# Patient Record
Sex: FEMALE | Race: BLACK OR AFRICAN AMERICAN | NOT HISPANIC OR LATINO | Employment: OTHER | URBAN - METROPOLITAN AREA
[De-identification: names, ages, dates, MRNs, and addresses within clinical notes are randomized per-mention and may not be internally consistent; named-entity substitution may affect disease eponyms.]

---

## 2017-06-02 PROBLEM — R55 NEAR SYNCOPE: Status: ACTIVE | Noted: 2017-06-02

## 2017-09-08 PROBLEM — R55 VASOVAGAL SYNCOPE: Status: ACTIVE | Noted: 2017-09-08

## 2018-01-16 ENCOUNTER — OFFICE VISIT (OUTPATIENT)
Dept: CARDIOLOGY | Facility: CLINIC | Age: 78
End: 2018-01-16
Payer: MEDICARE

## 2018-01-16 VITALS
HEIGHT: 63 IN | DIASTOLIC BLOOD PRESSURE: 76 MMHG | SYSTOLIC BLOOD PRESSURE: 130 MMHG | HEART RATE: 77 BPM | WEIGHT: 180.69 LBS | BODY MASS INDEX: 32.02 KG/M2

## 2018-01-16 DIAGNOSIS — I34.0 MITRAL VALVE INSUFFICIENCY, UNSPECIFIED ETIOLOGY: ICD-10-CM

## 2018-01-16 DIAGNOSIS — E78.5 HYPERLIPIDEMIA, UNSPECIFIED HYPERLIPIDEMIA TYPE: ICD-10-CM

## 2018-01-16 DIAGNOSIS — I10 ESSENTIAL HYPERTENSION: Primary | ICD-10-CM

## 2018-01-16 PROCEDURE — 93000 ELECTROCARDIOGRAM COMPLETE: CPT | Mod: S$GLB,,, | Performed by: INTERNAL MEDICINE

## 2018-01-16 PROCEDURE — 99999 PR PBB SHADOW E&M-EST. PATIENT-LVL III: CPT | Mod: PBBFAC,,, | Performed by: INTERNAL MEDICINE

## 2018-01-16 PROCEDURE — 99213 OFFICE O/P EST LOW 20 MIN: CPT | Mod: S$GLB,,, | Performed by: INTERNAL MEDICINE

## 2018-01-16 RX ORDER — LANOLIN ALCOHOL/MO/W.PET/CERES
100 CREAM (GRAM) TOPICAL DAILY
COMMUNITY

## 2018-01-16 NOTE — PROGRESS NOTES
Subjective:      Patient ID: Lori Pulido is a 77 y.o. female.    Chief Complaint: Follow-up (HTN)    HPI:  Pt was evaluated by Dr Maria last year for woozziness and has a f/u appt in March.  MRI and EEG were reportedly OK.    Review of Systems   Cardiovascular: Positive for dyspnea on exertion (Walking far distance in the cold). Negative for chest pain, claudication, irregular heartbeat, leg swelling, near-syncope, orthopnea, palpitations and syncope.      Had trigeminal neuralgia  Pain left ear. Pt was given Tylenol 4 for the pain by the orthopedic MD--Dr Ortega at [facility].  Pt had this once before.     Pt has arthritis in the knee and sciatica.    Pt c/o constipation with Tylenol #4  Past Medical History:   Diagnosis Date    Allergy     DVT (deep venous thrombosis)     after TKA    Hyperlipidemia     Hypertension         Past Surgical History:   Procedure Laterality Date    APPENDECTOMY      BREAST SURGERY      lumpectomy    CARPAL TUNNEL RELEASE      CATARACT EXTRACTION BILATERAL W/ ANTERIOR VITRECTOMY       SECTION      HYSTERECTOMY      TVH    JOINT REPLACEMENT      L knee    TOTAL KNEE ARTHROPLASTY         Family History   Problem Relation Age of Onset    Heart disease Father     Cancer Sister 89     breast cancer    Dementia Sister        Social History     Social History    Marital status:      Spouse name: N/A    Number of children: N/A    Years of education: N/A     Social History Main Topics    Smoking status: Former Smoker    Smokeless tobacco: Never Used    Alcohol use No    Drug use: No    Sexual activity: Not Asked     Other Topics Concern    None     Social History Narrative    None       Current Outpatient Prescriptions on File Prior to Visit   Medication Sig Dispense Refill    acetaminophen (TYLENOL) 325 MG tablet Take 325 mg by mouth every 6 (six) hours as needed for Pain.      aspirin (ECOTRIN) 81 MG EC tablet Take 81 mg by mouth once daily.       "hydrochlorothiazide (MICROZIDE) 12.5 mg capsule Take 1 capsule (12.5 mg total) by mouth once daily. 90 capsule 3    losartan (COZAAR) 50 MG tablet Take 25 mg by mouth once daily.      pantoprazole (PROTONIX) 40 MG tablet TK 1 T PO QD  0    rosuvastatin (CRESTOR) 5 MG tablet       [DISCONTINUED] diclofenac (VOLTAREN) 50 MG EC tablet Take 50 mg by mouth 2 (two) times daily.       No current facility-administered medications on file prior to visit.        Review of patient's allergies indicates:   Allergen Reactions    Morphine     Penicillins      Objective:     Vitals:    01/16/18 1046   BP: 130/76   BP Location: Left arm   Patient Position: Sitting   BP Method: Large (Manual)   Pulse: 77   Weight: 82 kg (180 lb 11.2 oz)   Height: 5' 3" (1.6 m)        Physical Exam   Constitutional: She is oriented to person, place, and time. She appears well-developed and well-nourished.   Eyes: No scleral icterus.   Neck: No JVD present. Carotid bruit is not present.   Cardiovascular: Normal rate and regular rhythm.  Exam reveals no gallop.    Murmur (I/VI systolic) heard.  Pulmonary/Chest: Breath sounds normal.   Musculoskeletal: She exhibits no edema.   Neurological: She is alert and oriented to person, place, and time.   Skin: Skin is warm and dry.   Psychiatric: She has a normal mood and affect. Her behavior is normal. Judgment and thought content normal.   Vitals reviewed.     ECG: NSR, WNL  Assessment:     1. Essential hypertension    2. Hyperlipidemia, unspecified hyperlipidemia type    3. Mitral valve insufficiency, unspecified etiology      Plan:   Lori was seen today for follow-up.    Diagnoses and all orders for this visit:    Essential hypertension    Hyperlipidemia, unspecified hyperlipidemia type    Mitral valve insufficiency, unspecified etiology     Continue same medical regimen.  Return to clinic in 6 months with ECG.  F/u with Dr Mckeon who does labs    No Follow-up on file.  "

## 2018-06-11 RX ORDER — HYDROCHLOROTHIAZIDE 12.5 MG/1
12.5 CAPSULE ORAL DAILY
Qty: 90 CAPSULE | Refills: 3 | Status: SHIPPED | OUTPATIENT
Start: 2018-06-11 | End: 2020-05-28 | Stop reason: SDUPTHER

## 2018-07-31 ENCOUNTER — OFFICE VISIT (OUTPATIENT)
Dept: CARDIOLOGY | Facility: CLINIC | Age: 78
End: 2018-07-31
Payer: MEDICARE

## 2018-07-31 VITALS
DIASTOLIC BLOOD PRESSURE: 64 MMHG | SYSTOLIC BLOOD PRESSURE: 108 MMHG | HEART RATE: 79 BPM | WEIGHT: 174.5 LBS | BODY MASS INDEX: 30.92 KG/M2 | HEIGHT: 63 IN

## 2018-07-31 DIAGNOSIS — I10 ESSENTIAL HYPERTENSION: Primary | ICD-10-CM

## 2018-07-31 DIAGNOSIS — E78.5 HYPERLIPIDEMIA, UNSPECIFIED HYPERLIPIDEMIA TYPE: ICD-10-CM

## 2018-07-31 DIAGNOSIS — I82.502 CHRONIC DEEP VEIN THROMBOSIS (DVT) OF LEFT LOWER EXTREMITY, UNSPECIFIED VEIN: ICD-10-CM

## 2018-07-31 DIAGNOSIS — R10.13 EPIGASTRIC PAIN: ICD-10-CM

## 2018-07-31 PROCEDURE — 99999 PR PBB SHADOW E&M-EST. PATIENT-LVL III: CPT | Mod: PBBFAC,,, | Performed by: INTERNAL MEDICINE

## 2018-07-31 PROCEDURE — 3074F SYST BP LT 130 MM HG: CPT | Mod: CPTII,S$GLB,, | Performed by: INTERNAL MEDICINE

## 2018-07-31 PROCEDURE — 99213 OFFICE O/P EST LOW 20 MIN: CPT | Mod: S$GLB,,, | Performed by: INTERNAL MEDICINE

## 2018-07-31 PROCEDURE — 93000 ELECTROCARDIOGRAM COMPLETE: CPT | Mod: S$GLB,,, | Performed by: INTERNAL MEDICINE

## 2018-07-31 PROCEDURE — 3078F DIAST BP <80 MM HG: CPT | Mod: CPTII,S$GLB,, | Performed by: INTERNAL MEDICINE

## 2018-07-31 RX ORDER — TRIAMCINOLONE ACETONIDE 1 MG/G
OINTMENT TOPICAL
COMMUNITY
Start: 2018-06-29

## 2018-07-31 RX ORDER — FLUTICASONE PROPIONATE 50 MCG
SPRAY, SUSPENSION (ML) NASAL DAILY PRN
COMMUNITY
Start: 2018-07-27 | End: 2019-08-27

## 2018-07-31 NOTE — PROGRESS NOTES
Subjective:      Patient ID: Lori Pulido is a 78 y.o. female.    Chief Complaint: Follow-up (C/O being off balance at times)    HPI:  Rarely off balance.  Goes to PT for sciatica and right knee  May get a gel injection in knee    Review of Systems   Cardiovascular: Negative for chest pain, claudication, dyspnea on exertion, irregular heartbeat, leg swelling, near-syncope, orthopnea, palpitations and syncope.      One day in April pt had a headache and shaking and diarrhea and blood pressure went up to 170 mm Hg.     Pt rarely gets an epigastric pain which radiates to the back which abates after 15 minutes.  The pain is not related to activity.  The pain occurs very rarely.  Last episode was 4 months ago  Past Medical History:   Diagnosis Date    Allergy     DVT (deep venous thrombosis)     after TKA    Hyperlipidemia     Hypertension         Past Surgical History:   Procedure Laterality Date    APPENDECTOMY      BREAST SURGERY      lumpectomy    CARPAL TUNNEL RELEASE      CATARACT EXTRACTION BILATERAL W/ ANTERIOR VITRECTOMY       SECTION      HYSTERECTOMY      TVH    JOINT REPLACEMENT      L knee    TOTAL KNEE ARTHROPLASTY         Family History   Problem Relation Age of Onset    Heart disease Father     Cancer Sister 89        breast cancer    Dementia Sister        Social History     Social History    Marital status:      Spouse name: N/A    Number of children: N/A    Years of education: N/A     Social History Main Topics    Smoking status: Former Smoker    Smokeless tobacco: Never Used    Alcohol use No    Drug use: No    Sexual activity: Not Asked     Other Topics Concern    None     Social History Narrative    None       Current Outpatient Prescriptions on File Prior to Visit   Medication Sig Dispense Refill    acetaminophen (TYLENOL) 325 MG tablet Take 325 mg by mouth every 6 (six) hours as needed for Pain.      aspirin (ECOTRIN) 81 MG EC tablet Take 81 mg by  "mouth once daily.      cyanocobalamin (VITAMIN B-12) 1000 MCG tablet Take 100 mcg by mouth once daily.      hydroCHLOROthiazide (MICROZIDE) 12.5 mg capsule Take 1 capsule (12.5 mg total) by mouth once daily. 90 capsule 3    losartan (COZAAR) 50 MG tablet Take 25 mg by mouth once daily.      pantoprazole (PROTONIX) 40 MG tablet TK 1 T PO QD  0    rosuvastatin (CRESTOR) 5 MG tablet Take 5 mg by mouth once daily.        No current facility-administered medications on file prior to visit.        Review of patient's allergies indicates:   Allergen Reactions    Morphine     Penicillins      Objective:     Vitals:    07/31/18 0951   BP: 108/64   BP Location: Left arm   Patient Position: Sitting   BP Method: Large (Manual)   Pulse: 79   Weight: 79.2 kg (174 lb 8 oz)   Height: 5' 3" (1.6 m)        Physical Exam   Constitutional: She is oriented to person, place, and time. She appears well-developed and well-nourished.   Eyes: No scleral icterus.   Neck: No JVD present. Carotid bruit is not present.   Cardiovascular: Normal rate and regular rhythm.  Exam reveals no gallop.    Murmur (I/VI systolic) heard.  Pulmonary/Chest: Breath sounds normal.   Musculoskeletal: She exhibits no edema.   Neurological: She is alert and oriented to person, place, and time.   Skin: Skin is warm and dry.   Psychiatric: She has a normal mood and affect. Her behavior is normal. Judgment and thought content normal.   Vitals reviewed.     Wt down 7 lbs    ECG: NSR, WNL  Assessment:     1. Essential hypertension    2. Hyperlipidemia, unspecified hyperlipidemia type    3. Chronic deep vein thrombosis (DVT) of left lower extremity, unspecified vein    4. Epigastric pain      Plan:   Lori was seen today for follow-up.    Diagnoses and all orders for this visit:    Essential hypertension    Hyperlipidemia, unspecified hyperlipidemia type    Chronic deep vein thrombosis (DVT) of left lower extremity, unspecified vein    Epigastric pain  -     US " Abdomen Complete; Future     Same meds  RTC 6 months  F/u with Dr Mckeon who does labs  Ultrasound of gallbladder  Follow-up in about 6 months (around 1/31/2019).

## 2019-02-12 ENCOUNTER — OFFICE VISIT (OUTPATIENT)
Dept: CARDIOLOGY | Facility: CLINIC | Age: 79
End: 2019-02-12
Payer: MEDICARE

## 2019-02-12 ENCOUNTER — TELEPHONE (OUTPATIENT)
Dept: CARDIOLOGY | Facility: CLINIC | Age: 79
End: 2019-02-12

## 2019-02-12 ENCOUNTER — HOSPITAL ENCOUNTER (OUTPATIENT)
Dept: RADIOLOGY | Facility: HOSPITAL | Age: 79
Discharge: HOME | End: 2019-02-12
Attending: INTERNAL MEDICINE
Payer: MEDICARE

## 2019-02-12 VITALS
BODY MASS INDEX: 30.71 KG/M2 | DIASTOLIC BLOOD PRESSURE: 86 MMHG | HEIGHT: 63 IN | SYSTOLIC BLOOD PRESSURE: 131 MMHG | WEIGHT: 173.31 LBS | HEART RATE: 73 BPM

## 2019-02-12 DIAGNOSIS — Z03.89 UNDER OBSERVATION FOR SUSPECTED CORONARY ARTERY DISEASE: ICD-10-CM

## 2019-02-12 DIAGNOSIS — R06.02 SHORTNESS OF BREATH: ICD-10-CM

## 2019-02-12 DIAGNOSIS — I10 ESSENTIAL HYPERTENSION: Primary | ICD-10-CM

## 2019-02-12 DIAGNOSIS — E78.00 PURE HYPERCHOLESTEROLEMIA: ICD-10-CM

## 2019-02-12 DIAGNOSIS — G60.9 IDIOPATHIC PERIPHERAL NEUROPATHY: ICD-10-CM

## 2019-02-12 DIAGNOSIS — Z91.89 AT RISK FOR CORONARY ARTERY DISEASE: ICD-10-CM

## 2019-02-12 DIAGNOSIS — I27.20 PULMONARY HYPERTENSION: ICD-10-CM

## 2019-02-12 DIAGNOSIS — R07.9 CHEST PAIN, UNSPECIFIED TYPE: ICD-10-CM

## 2019-02-12 DIAGNOSIS — I20.89 ATYPICAL ANGINA: ICD-10-CM

## 2019-02-12 DIAGNOSIS — R06.09 DYSPNEA ON EXERTION: ICD-10-CM

## 2019-02-12 PROCEDURE — 1101F PT FALLS ASSESS-DOCD LE1/YR: CPT | Mod: CPTII,S$GLB,, | Performed by: INTERNAL MEDICINE

## 2019-02-12 PROCEDURE — 71046 X-RAY EXAM CHEST 2 VIEWS: CPT | Mod: TC,FY

## 2019-02-12 PROCEDURE — 99999 PR PBB SHADOW E&M-EST. PATIENT-LVL III: ICD-10-PCS | Mod: PBBFAC,,, | Performed by: INTERNAL MEDICINE

## 2019-02-12 PROCEDURE — 71046 XR CHEST PA AND LATERAL: ICD-10-PCS | Mod: 26,,, | Performed by: RADIOLOGY

## 2019-02-12 PROCEDURE — 3079F PR MOST RECENT DIASTOLIC BLOOD PRESSURE 80-89 MM HG: ICD-10-PCS | Mod: CPTII,S$GLB,, | Performed by: INTERNAL MEDICINE

## 2019-02-12 PROCEDURE — 71046 X-RAY EXAM CHEST 2 VIEWS: CPT | Mod: 26,,, | Performed by: RADIOLOGY

## 2019-02-12 PROCEDURE — 99214 PR OFFICE/OUTPT VISIT, EST, LEVL IV, 30-39 MIN: ICD-10-PCS | Mod: S$GLB,,, | Performed by: INTERNAL MEDICINE

## 2019-02-12 PROCEDURE — 3079F DIAST BP 80-89 MM HG: CPT | Mod: CPTII,S$GLB,, | Performed by: INTERNAL MEDICINE

## 2019-02-12 PROCEDURE — 99214 OFFICE O/P EST MOD 30 MIN: CPT | Mod: S$GLB,,, | Performed by: INTERNAL MEDICINE

## 2019-02-12 PROCEDURE — 93000 EKG 12-LEAD: ICD-10-PCS | Mod: S$GLB,,, | Performed by: INTERNAL MEDICINE

## 2019-02-12 PROCEDURE — 3075F SYST BP GE 130 - 139MM HG: CPT | Mod: CPTII,S$GLB,, | Performed by: INTERNAL MEDICINE

## 2019-02-12 PROCEDURE — 99999 PR PBB SHADOW E&M-EST. PATIENT-LVL III: CPT | Mod: PBBFAC,,, | Performed by: INTERNAL MEDICINE

## 2019-02-12 PROCEDURE — 99499 UNLISTED E&M SERVICE: CPT | Mod: S$GLB,,, | Performed by: INTERNAL MEDICINE

## 2019-02-12 PROCEDURE — 1101F PR PT FALLS ASSESS DOC 0-1 FALLS W/OUT INJ PAST YR: ICD-10-PCS | Mod: CPTII,S$GLB,, | Performed by: INTERNAL MEDICINE

## 2019-02-12 PROCEDURE — 93000 ELECTROCARDIOGRAM COMPLETE: CPT | Mod: S$GLB,,, | Performed by: INTERNAL MEDICINE

## 2019-02-12 PROCEDURE — 3075F PR MOST RECENT SYSTOLIC BLOOD PRESS GE 130-139MM HG: ICD-10-PCS | Mod: CPTII,S$GLB,, | Performed by: INTERNAL MEDICINE

## 2019-02-12 PROCEDURE — 99499 RISK ADDL DX/OHS AUDIT: ICD-10-PCS | Mod: S$GLB,,, | Performed by: INTERNAL MEDICINE

## 2019-02-12 NOTE — PROGRESS NOTES
"  Subjective:      Patient ID: Lori Pulido is a 79 y.o. female.    Chief Complaint: Follow-up (Hypertension)    HPI:  Right knee gets stiff.  Had gel injected into right knee  Pt is S/P left TKA  Pt had only two episodes of epigastric pain radiating to back last year associated with vomiting and eased with vomiting.  The epigastric/retrosternal pains are severe and radiate to the back.  Pt saw Dr Howe--pt has stones in duct.  Pt is contemplating ?ERCP and has an appt to see a general surgeon for possible cholecystectomy--Dr Orin Lara.    Pt walks and had gone to therapy and went to [facility] program.  Pt still has sciatica bilaterally.    Review of Systems   Cardiovascular: Positive for dyspnea on exertion (mild,chronic). Negative for chest pain, claudication, irregular heartbeat, leg swelling, near-syncope, orthopnea, palpitations and syncope.      No food intolerance.    Pt states that at times her feet feel tingly and cold even when they are not cool to the touch.  "Sometimes it feels like I am walking barefoot in water."    Pt c/o chronic insomnia    Pt has LLQ pain eased with BM  Past Medical History:   Diagnosis Date    Allergy     DVT (deep venous thrombosis)     after TKA    Hyperlipidemia     Hypertension         Past Surgical History:   Procedure Laterality Date    APPENDECTOMY      BREAST SURGERY      lumpectomy    CARPAL TUNNEL RELEASE      CATARACT EXTRACTION BILATERAL W/ ANTERIOR VITRECTOMY       SECTION      HYSTERECTOMY      TVH    JOINT REPLACEMENT      L knee    TOTAL KNEE ARTHROPLASTY         Family History   Problem Relation Age of Onset    Heart disease Father     Cancer Sister 89        breast cancer    Dementia Sister        Social History     Socioeconomic History    Marital status:      Spouse name: None    Number of children: None    Years of education: None    Highest education level: None   Social Needs    Financial resource strain: None    " "Food insecurity - worry: None    Food insecurity - inability: None    Transportation needs - medical: None    Transportation needs - non-medical: None   Occupational History    None   Tobacco Use    Smoking status: Former Smoker    Smokeless tobacco: Never Used   Substance and Sexual Activity    Alcohol use: No    Drug use: No    Sexual activity: None   Other Topics Concern    None   Social History Narrative    None       Current Outpatient Medications on File Prior to Visit   Medication Sig Dispense Refill    acetaminophen (TYLENOL) 325 MG tablet Take 325 mg by mouth every 6 (six) hours as needed for Pain.      aspirin (ECOTRIN) 81 MG EC tablet Take 81 mg by mouth once daily.      cyanocobalamin (VITAMIN B-12) 1000 MCG tablet Take 100 mcg by mouth once daily.      fluticasone (FLONASE) 50 mcg/actuation nasal spray daily as needed.       hydroCHLOROthiazide (MICROZIDE) 12.5 mg capsule Take 1 capsule (12.5 mg total) by mouth once daily. 90 capsule 3    losartan (COZAAR) 50 MG tablet Take 25 mg by mouth once daily.      pantoprazole (PROTONIX) 40 MG tablet TK 1 T PO QD  0    rosuvastatin (CRESTOR) 5 MG tablet Take 5 mg by mouth once daily.       triamcinolone acetonide 0.1% (KENALOG) 0.1 % ointment        No current facility-administered medications on file prior to visit.        Review of patient's allergies indicates:   Allergen Reactions    Morphine     Penicillins      Objective:     Vitals:    02/12/19 0958   BP: 131/86   BP Location: Left arm   Patient Position: Sitting   BP Method: Large (Automatic)   Pulse: 73   Weight: 78.6 kg (173 lb 4.8 oz)   Height: 5' 3" (1.6 m)        Physical Exam   Constitutional: She is oriented to person, place, and time. She appears well-developed and well-nourished.   Eyes: No scleral icterus.   Neck: No JVD present. Carotid bruit is not present.   Cardiovascular: Normal rate and regular rhythm. Exam reveals no gallop.   No murmur heard.  Pulmonary/Chest: " Breath sounds normal.   Musculoskeletal: She exhibits no edema.   Neurological: She is alert and oriented to person, place, and time.   Skin: Skin is warm and dry.   Psychiatric: She has a normal mood and affect. Her behavior is normal. Judgment and thought content normal.   Vitals reviewed.     Wt stable  ECG: NSR, WNL    Note FBS last year was 104    Wt down 7 lbs over past year    Note last echo 2015 showed mild pulmonary hypertension with PAP45 mm Hg.  LVEF was normal.  Assessment:     1. Essential hypertension    2. Pure hypercholesterolemia    3. Shortness of breath    4. Idiopathic peripheral neuropathy    5. Dyspnea on exertion    6. Pulmonary hypertension    7. Under observation for suspected coronary artery disease    8. At risk for coronary artery disease    9. Atypical angina    10. Chest pain, unspecified type      Plan:   Lori was seen today for follow-up.    Diagnoses and all orders for this visit:    Essential hypertension  -     IN OFFICE EKG 12-LEAD (to Muse)    Pure hypercholesterolemia    Shortness of breath  -     Transthoracic echo (TTE) complete (Cupid Only); Future  -     X-Ray Chest PA And Lateral; Future    Idiopathic peripheral neuropathy    Dyspnea on exertion    Pulmonary hypertension  -     NM Myocardial Perfusion Spect Multi Pharmacologic; Future    Under observation for suspected coronary artery disease  -     NM Myocardial Perfusion Spect Multi Pharmacologic; Future    At risk for coronary artery disease    Atypical angina  -     Treadmill Stress Test (CUPID ONLY); Future    Chest pain, unspecified type  -     NM Myocardial Perfusion Spect Multi Pharmacologic; Future     Pt has symptoms of a peripheral neuropathy which may be due to borderline diabetes.  Pt also has a hx of B12 deficiency and has bilateral sciatica as potential causes of dysesthesias of feet.    Consider gabapentin.    Pt is medically stable for cholecystectomy if necessary.   F/u with Mendoza Howe and  Marco.    Will review labs from Dr Mckeon.    Will repeat echocardiogram with doppler to f/u mild pulmonary hypertension    Will repeat Lexiscan Cardiolite stress test due to shortness of breath with exertion and the epigastric/retrosternal pains (which are suspected to be due to gallstones)    Will also get CXR    Follow-up in about 6 months (around 8/12/2019).

## 2019-02-13 ENCOUNTER — TELEPHONE (OUTPATIENT)
Dept: CARDIOLOGY | Facility: CLINIC | Age: 79
End: 2019-02-13

## 2019-02-13 NOTE — TELEPHONE ENCOUNTER
Scheduled patient's Lexiscan stress and Echo at [facility] per patients request.  Tests scheduled for February 15, 2019 @ 7am with Echo to follow.  Patient notified and verbalized understanding.

## 2019-02-15 ENCOUNTER — TELEPHONE (OUTPATIENT)
Dept: CARDIOLOGY | Facility: CLINIC | Age: 79
End: 2019-02-15

## 2019-02-21 RX ORDER — LOSARTAN POTASSIUM 50 MG/1
25 TABLET ORAL DAILY
Qty: 90 TABLET | Refills: 3 | Status: SHIPPED | OUTPATIENT
Start: 2019-02-21 | End: 2020-05-28 | Stop reason: SDUPTHER

## 2019-08-27 ENCOUNTER — OFFICE VISIT (OUTPATIENT)
Dept: CARDIOLOGY | Facility: CLINIC | Age: 79
End: 2019-08-27
Payer: MEDICARE

## 2019-08-27 VITALS
DIASTOLIC BLOOD PRESSURE: 77 MMHG | BODY MASS INDEX: 30.54 KG/M2 | HEART RATE: 85 BPM | SYSTOLIC BLOOD PRESSURE: 112 MMHG | WEIGHT: 172.38 LBS | HEIGHT: 63 IN

## 2019-08-27 DIAGNOSIS — I10 ESSENTIAL HYPERTENSION: Primary | ICD-10-CM

## 2019-08-27 DIAGNOSIS — E78.00 PURE HYPERCHOLESTEROLEMIA: ICD-10-CM

## 2019-08-27 PROCEDURE — 1101F PR PT FALLS ASSESS DOC 0-1 FALLS W/OUT INJ PAST YR: ICD-10-PCS | Mod: CPTII,S$GLB,, | Performed by: INTERNAL MEDICINE

## 2019-08-27 PROCEDURE — 99213 PR OFFICE/OUTPT VISIT, EST, LEVL III, 20-29 MIN: ICD-10-PCS | Mod: S$GLB,,, | Performed by: INTERNAL MEDICINE

## 2019-08-27 PROCEDURE — 1101F PT FALLS ASSESS-DOCD LE1/YR: CPT | Mod: CPTII,S$GLB,, | Performed by: INTERNAL MEDICINE

## 2019-08-27 PROCEDURE — 93000 ELECTROCARDIOGRAM COMPLETE: CPT | Mod: S$GLB,,, | Performed by: INTERNAL MEDICINE

## 2019-08-27 PROCEDURE — 3074F SYST BP LT 130 MM HG: CPT | Mod: CPTII,S$GLB,, | Performed by: INTERNAL MEDICINE

## 2019-08-27 PROCEDURE — 93000 EKG 12-LEAD: ICD-10-PCS | Mod: S$GLB,,, | Performed by: INTERNAL MEDICINE

## 2019-08-27 PROCEDURE — 99213 OFFICE O/P EST LOW 20 MIN: CPT | Mod: S$GLB,,, | Performed by: INTERNAL MEDICINE

## 2019-08-27 PROCEDURE — 99999 PR PBB SHADOW E&M-EST. PATIENT-LVL II: ICD-10-PCS | Mod: PBBFAC,,, | Performed by: INTERNAL MEDICINE

## 2019-08-27 PROCEDURE — 99999 PR PBB SHADOW E&M-EST. PATIENT-LVL II: CPT | Mod: PBBFAC,,, | Performed by: INTERNAL MEDICINE

## 2019-08-27 PROCEDURE — 99499 RISK ADDL DX/OHS AUDIT: ICD-10-PCS | Mod: S$GLB,,, | Performed by: INTERNAL MEDICINE

## 2019-08-27 PROCEDURE — 3074F PR MOST RECENT SYSTOLIC BLOOD PRESSURE < 130 MM HG: ICD-10-PCS | Mod: CPTII,S$GLB,, | Performed by: INTERNAL MEDICINE

## 2019-08-27 PROCEDURE — 3078F DIAST BP <80 MM HG: CPT | Mod: CPTII,S$GLB,, | Performed by: INTERNAL MEDICINE

## 2019-08-27 PROCEDURE — 99499 UNLISTED E&M SERVICE: CPT | Mod: S$GLB,,, | Performed by: INTERNAL MEDICINE

## 2019-08-27 PROCEDURE — 3078F PR MOST RECENT DIASTOLIC BLOOD PRESSURE < 80 MM HG: ICD-10-PCS | Mod: CPTII,S$GLB,, | Performed by: INTERNAL MEDICINE

## 2019-08-27 RX ORDER — GABAPENTIN 100 MG/1
CAPSULE ORAL
Refills: 1 | COMMUNITY
Start: 2019-06-05 | End: 2020-08-13 | Stop reason: SDUPTHER

## 2019-08-27 RX ORDER — LORATADINE 10 MG/1
TABLET ORAL
COMMUNITY
Start: 2019-02-15

## 2019-08-27 NOTE — PROGRESS NOTES
"  Subjective:      Patient ID: Lori Pulido is a 79 y.o. female.    Chief Complaint: Follow-up (hypertension)    HPI:  Pt had ERCP followed by cholecystectomy followed by recurrent ERCP for retained stones and required PICC line for sepsis afterwards.    "I have had a couple of little episodes which remind me of the symptoms of epigastric pain which radiates around the RUQ similar to before the ERCP but no longer associated with vomiting.    Review of Systems   Cardiovascular: Positive for dyspnea on exertion (mild, chronic). Negative for chest pain, claudication, irregular heartbeat, leg swelling, near-syncope, orthopnea, palpitations and syncope.      Right knee hurts a lot.    Past Medical History:   Diagnosis Date    Allergy     DVT (deep venous thrombosis)     after TKA    Hyperlipidemia     Hypertension         Past Surgical History:   Procedure Laterality Date    APPENDECTOMY      BREAST SURGERY      lumpectomy    CARPAL TUNNEL RELEASE      CATARACT EXTRACTION BILATERAL W/ ANTERIOR VITRECTOMY       SECTION      HYSTERECTOMY      TVH    JOINT REPLACEMENT      L knee    LAPAROSCOPIC CHOLECYSTECTOMY WITH CHOLANGIOGRAPHY  2019    TOTAL KNEE ARTHROPLASTY         Family History   Problem Relation Age of Onset    Heart disease Father     Cancer Sister 89        breast cancer    Dementia Sister        Social History     Socioeconomic History    Marital status:      Spouse name: Not on file    Number of children: Not on file    Years of education: Not on file    Highest education level: Not on file   Occupational History    Not on file   Social Needs    Financial resource strain: Not on file    Food insecurity:     Worry: Not on file     Inability: Not on file    Transportation needs:     Medical: Not on file     Non-medical: Not on file   Tobacco Use    Smoking status: Former Smoker    Smokeless tobacco: Never Used   Substance and Sexual Activity    Alcohol use: " "No    Drug use: No    Sexual activity: Not on file   Lifestyle    Physical activity:     Days per week: Not on file     Minutes per session: Not on file    Stress: Not on file   Relationships    Social connections:     Talks on phone: Not on file     Gets together: Not on file     Attends [employer] service: Not on file     Active member of club or organization: Not on file     Attends meetings of clubs or organizations: Not on file     Relationship status: Not on file   Other Topics Concern    Not on file   Social History Narrative    Not on file       Current Outpatient Medications on File Prior to Visit   Medication Sig Dispense Refill    acetaminophen (TYLENOL) 325 MG tablet Take 325 mg by mouth every 6 (six) hours as needed for Pain.      aspirin (ECOTRIN) 81 MG EC tablet Take 81 mg by mouth once daily.      cyanocobalamin (VITAMIN B-12) 1000 MCG tablet Take 100 mcg by mouth once daily.      gabapentin (NEURONTIN) 100 MG capsule TAKE 1 TABLET BY MOUTH AT BEDTIME AS TOLERATED  1    hydroCHLOROthiazide (MICROZIDE) 12.5 mg capsule Take 1 capsule (12.5 mg total) by mouth once daily. 90 capsule 3    loratadine (CLARITIN) 10 mg tablet Take by mouth.      losartan (COZAAR) 50 MG tablet Take 0.5 tablets (25 mg total) by mouth once daily. 90 tablet 3    pantoprazole (PROTONIX) 40 MG tablet TK 1 T PO QD  0    rosuvastatin (CRESTOR) 5 MG tablet Take 5 mg by mouth once daily.       triamcinolone acetonide 0.1% (KENALOG) 0.1 % ointment       [DISCONTINUED] fluticasone (FLONASE) 50 mcg/actuation nasal spray daily as needed.        No current facility-administered medications on file prior to visit.        Review of patient's allergies indicates:   Allergen Reactions    Morphine     Penicillins      Objective:     Vitals:    08/27/19 1047   BP: 112/77   BP Location: Left arm   Patient Position: Sitting   BP Method: Large (Automatic)   Pulse: 85   Weight: 78.2 kg (172 lb 6.4 oz)   Height: 5' 3" (1.6 m)    "     Physical Exam   Constitutional: She is oriented to person, place, and time. She appears well-developed and well-nourished.   Eyes: No scleral icterus.   Neck: No JVD present. Carotid bruit is not present.   Cardiovascular: Normal rate and regular rhythm. Exam reveals no gallop.   No murmur heard.  Pulmonary/Chest: Breath sounds normal.   Musculoskeletal: She exhibits no edema.   Neurological: She is alert and oriented to person, place, and time.   Skin: Skin is warm and dry.   Psychiatric: She has a normal mood and affect. Her behavior is normal. Judgment and thought content normal.   Vitals reviewed.     Wt down 8 lbs    ECG: NSR with PAC's, WNL    Note-- Cardiolite stress test earlier this year was normal with normal LVEF      Assessment:     1. Essential hypertension    2. Pure hypercholesterolemia      Plan:   Lori was seen today for follow-up.    Diagnoses and all orders for this visit:    Essential hypertension  -     IN OFFICE EKG 12-LEAD (to Muse)    Pure hypercholesterolemia     f/u with Dr Howe    F/u with Dr Du who is now with [facility]    Will try to obtain labs done recently    Follow up in about 6 months (around 2/27/2020).

## 2019-09-30 ENCOUNTER — TELEPHONE (OUTPATIENT)
Dept: PRIMARY CARE CLINIC | Facility: CLINIC | Age: 79
End: 2019-09-30

## 2019-09-30 NOTE — TELEPHONE ENCOUNTER
----- Message from Bertha Ceballos sent at 9/30/2019  2:31 PM CDT -----  Contact: patient [phone]  Patient called and said she is returning a call from this morning.The message said that you needed to change the time of her appointment but she needs to know before the end of today. Patient is scheduled for 8:40 and doesn't want to come if you are changing her time. Please call patient asap .

## 2019-09-30 NOTE — PROGRESS NOTES
[facility] Note    Chief Complaint      Chief Complaint   Patient presents with    Medication Management       History of Present Illness      Lori Pulido is a 79 y.o. AAF with HTN, HLD, GERD, Choledocholithiasis, OA presents to re-est care and to fu chronic issues and for pre-op clearance for RT TKA sched for 10/28/19 with Dr. Cuellar. She was last seen by me at [facility].  Await records for review.    HTN - Pt controlled on Losartan 25 mg/d, and HCTZ12.5 mg/d. Fu by [facility], Dr. Stevens whom she saw 4 wks ago. Cardiolite stress test earlier this year was normal with normal LVEF    HLD - Pt on Crestor 5 mg QHS.      ? Vit B12 def- Pt on Vit B12 1000 mcg/d.    Neuropathy - Pt is on gabapentin 100 mg QHS.     Nicotine dependence - Pt is a current some day smoker. 1 pack every 2-3 wks. Rec cessation.     GERD - Pt on Protonix 40 mg po Prn. Stable.     Choledocholithiasis - Pt had repeat ERCP yest with Dr. Pappas. Sched for repeat LFT's in 4 wks.  Note pt has had ERCP followed by cholecystectomy followed by recurrent ERCP for retained stones and required PICC line for sepsis afterwards    Oa - Fu by Dr. Cuellar.  Planning for Rt TKA on 10/28/19.     HCM - Flu - 10/1/19;  Td - ? ;  PCV 13 - ?;  PVX 23 - ?;  Shingrix - none;  PAP - no longer gets;  MGM - ;  DEXA - ?16;  C-scope - ?'16 -  Had a polyp;  CRS - Dr. Morales;  GI - Dr. Pappas; [facility] - Dr. Stevens; Ortho - Dr. Cuellar    Past Medical History:  Past Medical History:   Diagnosis Date    Allergic rhinitis     Choledocholithiasis     DVT (deep venous thrombosis)     after TKA    GERD (gastroesophageal reflux disease)     Hemorrhoids     Hyperlipidemia     Hypertension     Neuropathy     Nicotine dependence        Past Surgical History:   has a past surgical history that includes  section; Appendectomy; Cataract extraction bilateral w/ anterior vitrectomy; Carpal tunnel release; Breast surgery; Hysterectomy; Laparoscopic  cholecystectomy with cholangiography (02/28/2019); and Total knee arthroplasty.    Family History:  family history includes Aortic dissection in her sister; Cancer (age of onset: 89) in her sister; Dementia in her sister; Heart disease in her father.     Social History:  Social History     Tobacco Use    Smoking status: Current Some Day Smoker     Years: 20.00     Types: Cigarettes    Smokeless tobacco: Never Used    Tobacco comment: 1 pack every 2-3 wks   Substance Use Topics    Alcohol use: No    Drug use: Never       Review of Systems   Constitutional: Negative for chills, diaphoresis and fever.   HENT: Positive for hearing loss. Negative for tinnitus.    Eyes: Negative for blurred vision and double vision.        Wears glasses   Respiratory: Positive for shortness of breath and wheezing. Negative for cough.         Sometimes gets NAIR - chronic   Cardiovascular: Positive for palpitations. Negative for chest pain and leg swelling.        No SOB, No CP, no Dizziness. Last ~ a few minutes and self resolves. If she sleeps on her left side she can feel it. Fu by Cards.    Gastrointestinal: Positive for constipation and heartburn. Negative for abdominal pain, blood in stool, diarrhea, melena, nausea and vomiting.        Sometimes gets constipated.  + Int Hemorrhoid and rarely gets blood on outside of stool after a hard stool. Never fills the toilet.    Genitourinary: Negative for dysuria and hematuria.   Musculoskeletal: Positive for joint pain. Negative for myalgias.        Left knee and low back   Skin: Positive for itching. Negative for rash.   Neurological: Negative for dizziness and headaches.   Endo/Heme/Allergies: Negative for polydipsia. Does not bruise/bleed easily.        Dry mouth at night   Psychiatric/Behavioral: Negative for depression. The patient is nervous/anxious.         Medications:  Outpatient Encounter Medications as of 10/1/2019   Medication Sig Note Dispense Refill    acetaminophen  (TYLENOL) 325 MG tablet Take 325 mg by mouth every 6 (six) hours as needed for Pain.       aspirin (ECOTRIN) 81 MG EC tablet Take 81 mg by mouth once daily.       cyanocobalamin (VITAMIN B-12) 1000 MCG tablet Take 100 mcg by mouth once daily.       gabapentin (NEURONTIN) 100 MG capsule TAKE 1 TABLET BY MOUTH AT BEDTIME AS TOLERATED   1    hydroCHLOROthiazide (MICROZIDE) 12.5 mg capsule Take 1 capsule (12.5 mg total) by mouth once daily.  90 capsule 3    loratadine (CLARITIN) 10 mg tablet Take by mouth.       losartan (COZAAR) 50 MG tablet Take 0.5 tablets (25 mg total) by mouth once daily.  90 tablet 3    pantoprazole (PROTONIX) 40 MG tablet TK 1 T PO QD 4/29/2016: Received from: External Pharmacy  0    rosuvastatin (CRESTOR) 5 MG tablet Take 5 mg by mouth once daily.  6/2/2017: Received from: External Pharmacy      triamcinolone acetonide 0.1% (KENALOG) 0.1 % ointment         No facility-administered encounter medications on file as of 10/1/2019.        Current Outpatient Medications:     acetaminophen (TYLENOL) 325 MG tablet, Take 325 mg by mouth every 6 (six) hours as needed for Pain., Disp: , Rfl:     aspirin (ECOTRIN) 81 MG EC tablet, Take 81 mg by mouth once daily., Disp: , Rfl:     cyanocobalamin (VITAMIN B-12) 1000 MCG tablet, Take 100 mcg by mouth once daily., Disp: , Rfl:     gabapentin (NEURONTIN) 100 MG capsule, TAKE 1 TABLET BY MOUTH AT BEDTIME AS TOLERATED, Disp: , Rfl: 1    hydroCHLOROthiazide (MICROZIDE) 12.5 mg capsule, Take 1 capsule (12.5 mg total) by mouth once daily., Disp: 90 capsule, Rfl: 3    loratadine (CLARITIN) 10 mg tablet, Take by mouth., Disp: , Rfl:     losartan (COZAAR) 50 MG tablet, Take 0.5 tablets (25 mg total) by mouth once daily., Disp: 90 tablet, Rfl: 3    pantoprazole (PROTONIX) 40 MG tablet, TK 1 T PO QD, Disp: , Rfl: 0    rosuvastatin (CRESTOR) 5 MG tablet, Take 5 mg by mouth once daily. , Disp: , Rfl:     triamcinolone acetonide 0.1% (KENALOG) 0.1 %  "ointment, , Disp: , Rfl:     flu vacc [id number],65yr up,PF (FLUZONE HIGH-DOSE 2019-20, PF,) 180 mcg/0.5 mL Syrg, inject 0.5 ml into the muscle for 1 dose, Disp: 0.5 mL, Rfl: 0    varicella-zoster [id number], PF, (SHINGRIX, PF,) 50 mcg/0.5 mL injection, Inject into the muscle., Disp: 0.5 mL, Rfl: 1    Allergies:  Review of patient's allergies indicates:   Allergen Reactions    Morphine      Itching/Hives    Penicillins        Health Maintenance:  Immunization History   Administered Date(s) Administered    Influenza - High Dose - PF (65 years and older) 10/01/2019    Zoster Recombinant 10/01/2019      Health Maintenance   Topic Date Due    TETANUS VACCINE  01/23/1958    DEXA SCAN  01/23/1979    Pneumococcal Vaccine (65+ Low/Medium Risk) (1 of 2 - PCV13) 01/23/2004    Aspirin/Antiplatelet Therapy  10/01/2020    Lipid Panel  06/10/2021      Objective:      Vital Signs  Temp: 98.1 °F (36.7 °C)  Pulse: 70  Resp: 20  SpO2: 97 %  BP: 114/72  BP Location: Right arm  Pain Score: 0-No pain  Height and Weight  Height: 5' 3" (160 cm)  Weight: 77.8 kg (171 lb 8.3 oz)  BSA (Calculated - sq m): 1.86 sq meters  BMI (Calculated): 30.4  Weight in (lb) to have BMI = 25: 140.8]    Laboratory:    Urine Microalbumin/Cr:  Lab Results   Component Value Date    MICALBCRE 9.6 07/25/2014     Physical Exam   Constitutional: She is oriented to person, place, and time. She appears well-developed and well-nourished. No distress.   HENT:   Head: Normocephalic and atraumatic.   Right Ear: External ear normal.   Left Ear: External ear normal.   Mouth/Throat: Oropharynx is clear and moist.   Eyes: Pupils are equal, round, and reactive to light. EOM are normal.   Neck: Normal range of motion. Neck supple. No thyromegaly present.   Cardiovascular: Normal rate, regular rhythm, normal heart sounds and intact distal pulses.   No murmur heard.  Pulmonary/Chest: Effort normal and breath sounds normal. No respiratory distress.   Abdominal: Soft. " Bowel sounds are normal. She exhibits no distension. There is no tenderness.   Musculoskeletal:   RT knee crepitus   Lymphadenopathy:     She has no cervical adenopathy.   Neurological: She is alert and oriented to person, place, and time.   Skin: Skin is warm and dry. She is not diaphoretic.   Psychiatric: She has a normal mood and affect.   Nursing note and vitals reviewed.          Assessment:       1. Preop exam for internal medicine    2. Hypertension, unspecified type    3. Choledocholithiasis    4. Cigarette nicotine dependence without complication    5. Neuropathy    6. Osteoarthritis of right knee, unspecified osteoarthritis type    7. Flu vaccine need        Lori Pulido is a 79 y.o.female with:    1. Preop exam for internal medicine  -Performed today.  Will get recent labs to review to clear pt for surgery.    2. Hypertension, unspecified type  -Stable.  Cont current regimen.    3. Choledocholithiasis  -Fu by Dr. Pappas.  Pt is s/p ERCP yest and is doing well today.  She is sched for repeat LFT's in 4 wks.     4. Cigarette nicotine dependence without complication  -Rec cessation.     5. Neuropathy  -Cont Gabapentin.  Will get old records to review.    6. Osteoarthritis of right knee, unspecified osteoarthritis type  -Planning for upcoming Rt TKA.  Will review labs to clear her. She is also going to get clearance form her Cards whom she saw 4 wks ago.    7. Flu vaccine need  -Admin today.       Chronic conditions status updated as per HPI.  Other than changes above, cont current medications and maintain follow up with specialists.  Return to clinic in 6 mos or sooner if needed    Fannie Du MD  [facility]

## 2019-09-30 NOTE — TELEPHONE ENCOUNTER
----- Message from Bertha Ceballos sent at 9/30/2019 12:33 PM CDT -----  Contact: patient [phone]  Patient is returning a phone call.  Who left a message for the patient: nurse  Does patient know what this is regarding:  Changing her appt for tomorrow  Comments:

## 2019-10-01 ENCOUNTER — IMMUNIZATION (OUTPATIENT)
Dept: PHARMACY | Facility: CLINIC | Age: 79
End: 2019-10-01
Payer: MEDICARE

## 2019-10-01 ENCOUNTER — OFFICE VISIT (OUTPATIENT)
Dept: PRIMARY CARE CLINIC | Facility: CLINIC | Age: 79
End: 2019-10-01
Payer: MEDICARE

## 2019-10-01 ENCOUNTER — IMMUNIZATION (OUTPATIENT)
Dept: PHARMACY | Facility: CLINIC | Age: 79
End: 2019-10-01

## 2019-10-01 VITALS
RESPIRATION RATE: 20 BRPM | DIASTOLIC BLOOD PRESSURE: 72 MMHG | HEIGHT: 63 IN | OXYGEN SATURATION: 97 % | HEART RATE: 70 BPM | WEIGHT: 171.5 LBS | TEMPERATURE: 98 F | BODY MASS INDEX: 30.39 KG/M2 | SYSTOLIC BLOOD PRESSURE: 114 MMHG

## 2019-10-01 DIAGNOSIS — F17.210 CIGARETTE NICOTINE DEPENDENCE WITHOUT COMPLICATION: ICD-10-CM

## 2019-10-01 DIAGNOSIS — Z23 FLU VACCINE NEED: ICD-10-CM

## 2019-10-01 DIAGNOSIS — K80.50 CHOLEDOCHOLITHIASIS: ICD-10-CM

## 2019-10-01 DIAGNOSIS — G62.9 NEUROPATHY: ICD-10-CM

## 2019-10-01 DIAGNOSIS — Z01.818 PREOP EXAM FOR INTERNAL MEDICINE: Primary | ICD-10-CM

## 2019-10-01 DIAGNOSIS — I10 HYPERTENSION, UNSPECIFIED TYPE: ICD-10-CM

## 2019-10-01 DIAGNOSIS — M17.11 OSTEOARTHRITIS OF RIGHT KNEE, UNSPECIFIED OSTEOARTHRITIS TYPE: ICD-10-CM

## 2019-10-01 PROCEDURE — 3074F PR MOST RECENT SYSTOLIC BLOOD PRESSURE < 130 MM HG: ICD-10-PCS | Mod: CPTII,S$GLB,, | Performed by: INTERNAL MEDICINE

## 2019-10-01 PROCEDURE — 99214 PR OFFICE/OUTPT VISIT, EST, LEVL IV, 30-39 MIN: ICD-10-PCS | Mod: S$GLB,,, | Performed by: INTERNAL MEDICINE

## 2019-10-01 PROCEDURE — 1101F PT FALLS ASSESS-DOCD LE1/YR: CPT | Mod: CPTII,S$GLB,, | Performed by: INTERNAL MEDICINE

## 2019-10-01 PROCEDURE — 99214 OFFICE O/P EST MOD 30 MIN: CPT | Mod: S$GLB,,, | Performed by: INTERNAL MEDICINE

## 2019-10-01 PROCEDURE — 99999 PR PBB SHADOW E&M-EST. PATIENT-LVL IV: ICD-10-PCS | Mod: PBBFAC,,, | Performed by: INTERNAL MEDICINE

## 2019-10-01 PROCEDURE — 1101F PR PT FALLS ASSESS DOC 0-1 FALLS W/OUT INJ PAST YR: ICD-10-PCS | Mod: CPTII,S$GLB,, | Performed by: INTERNAL MEDICINE

## 2019-10-01 PROCEDURE — 99999 PR PBB SHADOW E&M-EST. PATIENT-LVL IV: CPT | Mod: PBBFAC,,, | Performed by: INTERNAL MEDICINE

## 2019-10-01 PROCEDURE — 99499 UNLISTED E&M SERVICE: CPT | Mod: S$GLB,,, | Performed by: INTERNAL MEDICINE

## 2019-10-01 PROCEDURE — 3078F DIAST BP <80 MM HG: CPT | Mod: CPTII,S$GLB,, | Performed by: INTERNAL MEDICINE

## 2019-10-01 PROCEDURE — 99499 RISK ADDL DX/OHS AUDIT: ICD-10-PCS | Mod: S$GLB,,, | Performed by: INTERNAL MEDICINE

## 2019-10-01 PROCEDURE — 3074F SYST BP LT 130 MM HG: CPT | Mod: CPTII,S$GLB,, | Performed by: INTERNAL MEDICINE

## 2019-10-01 PROCEDURE — 3078F PR MOST RECENT DIASTOLIC BLOOD PRESSURE < 80 MM HG: ICD-10-PCS | Mod: CPTII,S$GLB,, | Performed by: INTERNAL MEDICINE

## 2019-12-20 ENCOUNTER — TELEPHONE (OUTPATIENT)
Dept: PRIMARY CARE CLINIC | Facility: CLINIC | Age: 79
End: 2019-12-20

## 2019-12-20 DIAGNOSIS — Z00.00 ROUTINE ADULT HEALTH MAINTENANCE: ICD-10-CM

## 2019-12-20 DIAGNOSIS — Z12.39 BREAST CANCER SCREENING: Primary | ICD-10-CM

## 2019-12-20 NOTE — TELEPHONE ENCOUNTER
Pt requesting external mammogram order. Pt would like to have mammogram done at . Please authorize. Order pended.  Calin Campbell LPN

## 2019-12-20 NOTE — TELEPHONE ENCOUNTER
----- Message from Gene Jha sent at 12/20/2019 11:45 AM CST -----  Contact: Patient [phone]  Type: Referral to a Specialist    Where would you like a referral to? mammo gram     Have you previously requested? No     Is the physician within the [facility]? No     Name and phone number of specialist: Diagnostic imaging     Reason for appointment: yearly     Is an appointment scheduled with specialist? When? Yes     12/24/19 8:45 am    Comments: call to inform has been faxed, requesting to be sent prior to Tuesday has a 8:45 am appt.    Please call an advise  Thank you

## 2019-12-20 NOTE — TELEPHONE ENCOUNTER
Did u mean to put in a diagnostic or screening MG? Her old MGM under media was wnl in Dec so unless there is another reason I am unaware of it appears it should be screening. If you meant for screening plese cancel this order and I will sign the correct one.    Dr. NUNEZ

## 2020-01-02 ENCOUNTER — TELEPHONE (OUTPATIENT)
Dept: PRIMARY CARE CLINIC | Facility: CLINIC | Age: 80
End: 2020-01-02

## 2020-01-02 NOTE — TELEPHONE ENCOUNTER
I sw patient and she expressed understanding. She does not need a new rx right now. She will double up on her current supply

## 2020-01-02 NOTE — TELEPHONE ENCOUNTER
Pt would like to know if you could increase her Gabapentin. She is currently on 100mg QHS. She reports it is working but not to full satisfaction.

## 2020-01-02 NOTE — TELEPHONE ENCOUNTER
She may inc to 2 capsules at night and if this is tolerated but not helping enough she can inc to 3 capsules at night in 1 wk.  See if she needs a new refill    Dr. NUNEZ

## 2020-01-15 ENCOUNTER — IMMUNIZATION (OUTPATIENT)
Dept: PHARMACY | Facility: CLINIC | Age: 80
End: 2020-01-15
Payer: MEDICARE

## 2020-03-05 ENCOUNTER — OFFICE VISIT (OUTPATIENT)
Dept: CARDIOLOGY | Facility: CLINIC | Age: 80
End: 2020-03-05
Payer: MEDICARE

## 2020-03-05 VITALS
BODY MASS INDEX: 29.95 KG/M2 | OXYGEN SATURATION: 98 % | SYSTOLIC BLOOD PRESSURE: 126 MMHG | DIASTOLIC BLOOD PRESSURE: 74 MMHG | HEART RATE: 74 BPM | HEIGHT: 63 IN | WEIGHT: 169 LBS

## 2020-03-05 DIAGNOSIS — E78.00 PURE HYPERCHOLESTEROLEMIA: ICD-10-CM

## 2020-03-05 DIAGNOSIS — I10 ESSENTIAL HYPERTENSION: Primary | ICD-10-CM

## 2020-03-05 DIAGNOSIS — I49.1 PREMATURE ATRIAL CONTRACTIONS: ICD-10-CM

## 2020-03-05 DIAGNOSIS — R00.2 PALPITATIONS: ICD-10-CM

## 2020-03-05 DIAGNOSIS — Z86.718 H/O DEEP VENOUS THROMBOSIS: ICD-10-CM

## 2020-03-05 PROCEDURE — 1101F PT FALLS ASSESS-DOCD LE1/YR: CPT | Mod: CPTII,S$GLB,, | Performed by: INTERNAL MEDICINE

## 2020-03-05 PROCEDURE — 1101F PR PT FALLS ASSESS DOC 0-1 FALLS W/OUT INJ PAST YR: ICD-10-PCS | Mod: CPTII,S$GLB,, | Performed by: INTERNAL MEDICINE

## 2020-03-05 PROCEDURE — 1159F MED LIST DOCD IN RCRD: CPT | Mod: S$GLB,,, | Performed by: INTERNAL MEDICINE

## 2020-03-05 PROCEDURE — 3074F SYST BP LT 130 MM HG: CPT | Mod: CPTII,S$GLB,, | Performed by: INTERNAL MEDICINE

## 2020-03-05 PROCEDURE — 93000 EKG 12-LEAD: ICD-10-PCS | Mod: S$GLB,,, | Performed by: INTERNAL MEDICINE

## 2020-03-05 PROCEDURE — 99214 OFFICE O/P EST MOD 30 MIN: CPT | Mod: 25,S$GLB,, | Performed by: INTERNAL MEDICINE

## 2020-03-05 PROCEDURE — 3078F DIAST BP <80 MM HG: CPT | Mod: CPTII,S$GLB,, | Performed by: INTERNAL MEDICINE

## 2020-03-05 PROCEDURE — 99214 PR OFFICE/OUTPT VISIT, EST, LEVL IV, 30-39 MIN: ICD-10-PCS | Mod: 25,S$GLB,, | Performed by: INTERNAL MEDICINE

## 2020-03-05 PROCEDURE — 93000 ELECTROCARDIOGRAM COMPLETE: CPT | Mod: S$GLB,,, | Performed by: INTERNAL MEDICINE

## 2020-03-05 PROCEDURE — 3074F PR MOST RECENT SYSTOLIC BLOOD PRESSURE < 130 MM HG: ICD-10-PCS | Mod: CPTII,S$GLB,, | Performed by: INTERNAL MEDICINE

## 2020-03-05 PROCEDURE — 99999 PR PBB SHADOW E&M-EST. PATIENT-LVL III: CPT | Mod: PBBFAC,,, | Performed by: INTERNAL MEDICINE

## 2020-03-05 PROCEDURE — 99999 PR PBB SHADOW E&M-EST. PATIENT-LVL III: ICD-10-PCS | Mod: PBBFAC,,, | Performed by: INTERNAL MEDICINE

## 2020-03-05 PROCEDURE — 1159F PR MEDICATION LIST DOCUMENTED IN MEDICAL RECORD: ICD-10-PCS | Mod: S$GLB,,, | Performed by: INTERNAL MEDICINE

## 2020-03-05 PROCEDURE — 3078F PR MOST RECENT DIASTOLIC BLOOD PRESSURE < 80 MM HG: ICD-10-PCS | Mod: CPTII,S$GLB,, | Performed by: INTERNAL MEDICINE

## 2020-03-05 RX ORDER — CELECOXIB 200 MG/1
CAPSULE ORAL
COMMUNITY
End: 2020-09-17

## 2020-03-05 NOTE — PROGRESS NOTES
Subjective:      Patient ID: Lori Pulido is a 80 y.o. female.    Chief Complaint: Follow-up (Hypertension)    HPI:  Had right TKA 10/28/19 by Dr Cuellar.  Finished PT.  Knee is still a little swollen.  Knee still itches.      Pt takes Celebrex and Tylenol prn.    Review of Systems   Cardiovascular: Positive for leg swelling (right knee since TKA). Negative for chest pain, claudication, dyspnea on exertion, irregular heartbeat, near-syncope, orthopnea, palpitations and syncope.        Past Medical History:   Diagnosis Date    Allergic rhinitis     Choledocholithiasis     DVT (deep venous thrombosis)     after TKA    GERD (gastroesophageal reflux disease)     Hemorrhoids     Hyperlipidemia     Hypertension     Neuropathy     Nicotine dependence         Past Surgical History:   Procedure Laterality Date    APPENDECTOMY      BREAST SURGERY      lumpectomy    CARPAL TUNNEL RELEASE      CATARACT EXTRACTION BILATERAL W/ ANTERIOR VITRECTOMY       SECTION      HYSTERECTOMY      partial    LAPAROSCOPIC CHOLECYSTECTOMY WITH CHOLANGIOGRAPHY  2019    TOTAL KNEE ARTHROPLASTY      L knee       Family History   Problem Relation Age of Onset    Heart disease Father     Cancer Sister 89        breast cancer    Dementia Sister     Aortic dissection Sister        Social History     Socioeconomic History    Marital status:      Spouse name: Not on file    Number of children: Not on file    Years of education: Not on file    Highest education level: Not on file   Occupational History    Not on file   Social Needs    Financial resource strain: Not on file    Food insecurity:     Worry: Not on file     Inability: Not on file    Transportation needs:     Medical: Not on file     Non-medical: Not on file   Tobacco Use    Smoking status: Former Smoker     Years: 20.00     Types: Cigarettes    Smokeless tobacco: Never Used    Tobacco comment: 1 pack every 2-3 wks   Substance and  Sexual Activity    Alcohol use: No    Drug use: Never    Sexual activity: Not Currently   Lifestyle    Physical activity:     Days per week: Not on file     Minutes per session: Not on file    Stress: Not on file   Relationships    Social connections:     Talks on phone: Not on file     Gets together: Not on file     Attends Latter day service: Not on file     Active member of club or organization: Not on file     Attends meetings of clubs or organizations: Not on file     Relationship status: Not on file   Other Topics Concern    Not on file   Social History Narrative    Not on file       Current Outpatient Medications on File Prior to Visit   Medication Sig Dispense Refill    acetaminophen (TYLENOL) 325 MG tablet Take 325 mg by mouth every 6 (six) hours as needed for Pain.      aspirin (ECOTRIN) 81 MG EC tablet Take 81 mg by mouth once daily.      celecoxib (CELEBREX) 200 MG capsule celecoxib 200 mg capsule   TAKE 1 CAPSULE BY MOUTH TWICE A DAY      cyanocobalamin (VITAMIN B-12) 1000 MCG tablet Take 100 mcg by mouth once daily.      gabapentin (NEURONTIN) 100 MG capsule TAKE 1 TABLET BY MOUTH AT BEDTIME AS TOLERATED  1    hydroCHLOROthiazide (MICROZIDE) 12.5 mg capsule Take 1 capsule (12.5 mg total) by mouth once daily. 90 capsule 3    loratadine (CLARITIN) 10 mg tablet Take by mouth.      losartan (COZAAR) 50 MG tablet Take 0.5 tablets (25 mg total) by mouth once daily. 90 tablet 3    pantoprazole (PROTONIX) 40 MG tablet TK 1 T PO QD  0    rosuvastatin (CRESTOR) 5 MG tablet Take 5 mg by mouth once daily.       triamcinolone acetonide 0.1% (KENALOG) 0.1 % ointment       [DISCONTINUED] flu vacc [id number],65yr up,PF (FLUZONE HIGH-DOSE 2019-20, PF,) 180 mcg/0.5 mL Syrg inject 0.5 ml into the muscle for 1 dose 0.5 mL 0    [DISCONTINUED] varicella-zoster [id number], PF, (SHINGRIX, PF,) 50 mcg/0.5 mL injection Inject into the muscle. 0.5 mL 1     No current facility-administered medications on file  "prior to visit.        Review of patient's allergies indicates:   Allergen Reactions    Morphine      Itching/Hives    Penicillins      Objective:     Vitals:    03/05/20 1004   BP: 126/74   BP Location: Left arm   Patient Position: Sitting   BP Method: Large (Automatic)   Pulse: 74   SpO2: 98%   Weight: 76.6 kg (168 lb 15.7 oz)   Height: 5' 3" (1.6 m)        Physical Exam   Constitutional: She is oriented to person, place, and time. She appears well-developed and well-nourished.   Eyes: No scleral icterus.   Neck: No JVD present. Carotid bruit is not present.   Cardiovascular: Normal rate and regular rhythm. Exam reveals no gallop.   No murmur heard.  Pulmonary/Chest: Breath sounds normal.   Musculoskeletal: She exhibits no edema.   Right knee incision is well healed. Mild right knee edema     Neurological: She is alert and oriented to person, place, and time.   Skin: Skin is warm and dry.   Psychiatric: She has a normal mood and affect. Her behavior is normal. Judgment and thought content normal.   Vitals reviewed.     Legs and calves are not edematous and not tender    ECG: NSR with PAC's, otherwise normal ECG    Note echocardiogram 2/19 showed normal LVEF and normal PA pressures.    Note Cardiolite stress test last year was normal    Assessment:     1. Essential hypertension    2. Palpitations    3. Premature atrial contractions    4. Pure hypercholesterolemia    5. H/O deep venous thrombosis      Plan:   Lori was seen today for follow-up.    Diagnoses and all orders for this visit:    Essential hypertension  -     IN OFFICE EKG 12-LEAD (to Muse)    Palpitations    Premature atrial contractions  -     IN OFFICE EKG 12-LEAD (to Muse)    Pure hypercholesterolemia    H/O deep venous thrombosis     Same meds    RTC 6 months    F/u with Dr Du next month who does labs    No follow-ups on file.  "

## 2020-03-06 ENCOUNTER — PATIENT OUTREACH (OUTPATIENT)
Dept: ADMINISTRATIVE | Facility: HOSPITAL | Age: 80
End: 2020-03-06

## 2020-03-11 RX ORDER — ROSUVASTATIN CALCIUM 5 MG/1
TABLET, COATED ORAL
Qty: 90 TABLET | Refills: 0 | Status: SHIPPED | OUTPATIENT
Start: 2020-03-11 | End: 2020-05-22

## 2020-03-13 ENCOUNTER — PATIENT OUTREACH (OUTPATIENT)
Dept: ADMINISTRATIVE | Facility: HOSPITAL | Age: 80
End: 2020-03-13

## 2020-03-13 NOTE — PROGRESS NOTES
Pre-visit chart review completed.  Immunizations reviewed and updated.    Patient due for the following    TETANUS VACCINE     Pneumococcal Vaccine (65+ Low/Medium Risk) (1 of 2 - PCV13)

## 2020-05-22 DIAGNOSIS — E78.00 PURE HYPERCHOLESTEROLEMIA: Primary | ICD-10-CM

## 2020-05-22 RX ORDER — ROSUVASTATIN CALCIUM 5 MG/1
TABLET, COATED ORAL
Qty: 90 TABLET | Refills: 0 | Status: SHIPPED | OUTPATIENT
Start: 2020-05-22 | End: 2020-08-20

## 2020-05-28 RX ORDER — HYDROCHLOROTHIAZIDE 12.5 MG/1
12.5 CAPSULE ORAL DAILY
Qty: 90 CAPSULE | Refills: 3 | Status: SHIPPED | OUTPATIENT
Start: 2020-05-28 | End: 2020-07-08 | Stop reason: SDUPTHER

## 2020-05-28 RX ORDER — LOSARTAN POTASSIUM 50 MG/1
25 TABLET ORAL DAILY
Qty: 90 TABLET | Refills: 3 | Status: SHIPPED | OUTPATIENT
Start: 2020-05-28 | End: 2020-07-08 | Stop reason: SDUPTHER

## 2020-06-17 ENCOUNTER — TELEPHONE (OUTPATIENT)
Dept: PRIMARY CARE CLINIC | Facility: CLINIC | Age: 80
End: 2020-06-17

## 2020-06-17 DIAGNOSIS — E78.00 PURE HYPERCHOLESTEROLEMIA: Primary | ICD-10-CM

## 2020-06-17 DIAGNOSIS — I10 ESSENTIAL HYPERTENSION: ICD-10-CM

## 2020-06-17 NOTE — TELEPHONE ENCOUNTER
----- Message from Alda Gonzalez sent at 6/17/2020  3:20 PM CDT -----  Contact: Migue [phone]  Patient Requesting Order    Order Needed:--Annual labs--    Communication Preference--Lori--[phone]    Additional Information:Pt calling to see if her labs orders can be put in the system for annual visit now so she can get them done when she comes in for her other labs. Please call to advise when put in the system.

## 2020-06-17 NOTE — TELEPHONE ENCOUNTER
What other labs is she referring to? All she needs is a CMP and FLP.  If this was ordered by another phsycian then that is ok and if not we can put in order.    Dr. NUNEZ

## 2020-06-19 ENCOUNTER — LAB VISIT (OUTPATIENT)
Dept: LAB | Facility: HOSPITAL | Age: 80
End: 2020-06-19
Attending: INTERNAL MEDICINE
Payer: MEDICARE

## 2020-06-19 ENCOUNTER — TELEPHONE (OUTPATIENT)
Dept: PRIMARY CARE CLINIC | Facility: CLINIC | Age: 80
End: 2020-06-19

## 2020-06-19 DIAGNOSIS — K22.70 BARRETT'S ESOPHAGUS: Primary | ICD-10-CM

## 2020-06-19 DIAGNOSIS — I10 ESSENTIAL HYPERTENSION: ICD-10-CM

## 2020-06-19 DIAGNOSIS — E78.00 PURE HYPERCHOLESTEROLEMIA: ICD-10-CM

## 2020-06-19 LAB
ALBUMIN SERPL BCP-MCNC: 4 G/DL (ref 3.5–5.2)
ALBUMIN SERPL BCP-MCNC: 4 G/DL (ref 3.5–5.2)
ALP SERPL-CCNC: 107 U/L (ref 55–135)
ALP SERPL-CCNC: 107 U/L (ref 55–135)
ALT SERPL W/O P-5'-P-CCNC: 10 U/L (ref 10–44)
ALT SERPL W/O P-5'-P-CCNC: 9 U/L (ref 10–44)
ANION GAP SERPL CALC-SCNC: 8 MMOL/L (ref 8–16)
AST SERPL-CCNC: 15 U/L (ref 10–40)
AST SERPL-CCNC: 16 U/L (ref 10–40)
BILIRUB DIRECT SERPL-MCNC: 0.3 MG/DL (ref 0.1–0.3)
BILIRUB SERPL-MCNC: 0.6 MG/DL (ref 0.1–1)
BILIRUB SERPL-MCNC: 0.6 MG/DL (ref 0.1–1)
BUN SERPL-MCNC: 18 MG/DL (ref 8–23)
CALCIUM SERPL-MCNC: 10.8 MG/DL (ref 8.7–10.5)
CHLORIDE SERPL-SCNC: 105 MMOL/L (ref 95–110)
CHOLEST SERPL-MCNC: 166 MG/DL (ref 120–199)
CHOLEST/HDLC SERPL: 3.5 {RATIO} (ref 2–5)
CO2 SERPL-SCNC: 27 MMOL/L (ref 23–29)
CREAT SERPL-MCNC: 1 MG/DL (ref 0.5–1.4)
EST. GFR  (AFRICAN AMERICAN): >60 ML/MIN/1.73 M^2
EST. GFR  (NON AFRICAN AMERICAN): 53 ML/MIN/1.73 M^2
GLUCOSE SERPL-MCNC: 94 MG/DL (ref 70–110)
HDLC SERPL-MCNC: 48 MG/DL (ref 40–75)
HDLC SERPL: 28.9 % (ref 20–50)
LDLC SERPL CALC-MCNC: 81 MG/DL (ref 63–159)
NONHDLC SERPL-MCNC: 118 MG/DL
POTASSIUM SERPL-SCNC: 4.2 MMOL/L (ref 3.5–5.1)
PROT SERPL-MCNC: 7.7 G/DL (ref 6–8.4)
PROT SERPL-MCNC: 7.7 G/DL (ref 6–8.4)
SODIUM SERPL-SCNC: 140 MMOL/L (ref 136–145)
TRIGL SERPL-MCNC: 185 MG/DL (ref 30–150)

## 2020-06-19 PROCEDURE — 80076 HEPATIC FUNCTION PANEL: CPT

## 2020-06-19 PROCEDURE — 80061 LIPID PANEL: CPT

## 2020-06-19 PROCEDURE — 80053 COMPREHEN METABOLIC PANEL: CPT

## 2020-06-19 NOTE — TELEPHONE ENCOUNTER
----- Message from Wen Desai sent at 6/19/2020  8:07 AM CDT -----  Regarding: Pt self Mobile/Home [phone]  Patient would like a call back in regards to her saying that you told her to call you for instructions before she have a blood test done.

## 2020-07-07 NOTE — PROGRESS NOTES
"[facility] Note    Chief Complaint      Chief Complaint   Patient presents with    Annual Exam    Medication Refill       History of Present Illness      Lori Pulido is a 80 y.o. AAF with HTN, HLD, GERD, Choledocholithiasis, OA presents to re-est care and to fu chronic issues and for pre-op clearance for RT TKA sched for 10/28/19 with Dr. Cuellar.  Last visit - 10/1/19.      HTN - Pt controlled on Losartan 25 mg/d, and HCTZ12.5 mg/d. Fu by [place], Dr. Stevens whom she saw in Mar. Cardiolite stress test earlier this year was normal with normal LVEF.     HLD - Pt on Crestor 5 mg QHS. Controlled.  Repeat in June.      Vit B12 def- Pt on Vit B12 1000 mcg/d.     Neuropathy - Pt is on Gabapentin 100-200 mg QHS.      Nicotine dependence - Pt is a current some day smoker. 5-6 cig/d but stopped 2 wks ago. Rec cessation.      GERD - Pt on Protonix 40 mg po Prn. Stable. Prilosec no help.      Choledocholithiasis - Pt had repeat ERCP yest with Dr. Pappas. Sched for repeat LFT's in 4 wks.  Note pt has had ERCP followed by cholecystectomy followed by recurrent ERCP for retained stones and required PICC line for sepsis afterwards     Oa - Fu by Dr. Cuellar.  Pt s/p RT TKA on 10/28/19.     CKD II - BUN/CR - 18/1.0 GFR - 53. Prev GFR - 55-70    Sciatica- Fu by Dr. Sin. Pt tired Diclofenac, Lyrica, Gabapentin, HYrocodone, and Tyelnol #3 in ast - no help.  She takes Tylenol #3/4 sparingly.     MNG - Largest nodule - 1.6 cm - reassured by Dr. Kearney. Will get records to review    Urinary incontinence - Wears pads sometimes but hasn't needed lately.  Kegals help. "Pretty good".     Insomnia - She feels gabapentin helps and sometimes takes a 2nd dose.  She drinks sleepy time tea.  She sometimes takes benadryl.  I cautioned her about this.     Memory Issues - RPR - NR.  TSH - wnl. B12 - 268 so pt on vit B12 suppl. She fu with Neuro and was reassured. She feels it is worsening and plans to fu with Dr. Maria " again.      HCM - Flu - 10/1/19;  Td - 8/31/15;  PCV 13 - 3/28/17;  PVX 23 - ?;  Shingrix - none;  PAP - no longer gets;  MGM - 19;  DEXA - 17;  C-scope - 20-  polyps - repeat 5 yrs;  CRS - Dr. Morales;  GI - Dr. Pappas; Cards - Dr. Stevens; Ortho - Dr. Cuellar; Derm - Dr. Rausch; Neuro - Dr. Maria     Past Medical History:  Past Medical History:   Diagnosis Date    Allergic rhinitis     Choledocholithiasis     DVT (deep venous thrombosis)     after TKA    GERD (gastroesophageal reflux disease)     Hemorrhoids     Hyperlipidemia     Hypertension     Neuropathy     Nicotine dependence        Past Surgical History:   has a past surgical history that includes  section; Appendectomy; Cataract extraction bilateral w/ anterior vitrectomy; Carpal tunnel release; Breast surgery; Hysterectomy; Laparoscopic cholecystectomy with cholangiography (2019); and Total knee arthroplasty.    Family History:  family history includes Aortic dissection in her sister; Cancer (age of onset: 89) in her sister; Dementia in her sister; Heart disease in her father.     Social History:  Social History     Tobacco Use    Smoking status: Former Smoker     Years: 20.00     Types: Cigarettes    Smokeless tobacco: Never Used    Tobacco comment: 1 pack every 2-3 wks   Substance Use Topics    Alcohol use: No    Drug use: Never       Review of Systems   Constitutional: Negative for chills and fever.   HENT: Negative for hearing loss and tinnitus.         Pt reports she has inc salivation.    Eyes: Positive for blurred vision. Negative for double vision.        Wears glasses.  Has fu with Ophtho in Nov.    Respiratory: Negative for cough and shortness of breath.    Cardiovascular: Negative for chest pain and palpitations.   Gastrointestinal: Positive for blood in stool and constipation. Negative for abdominal pain, diarrhea, heartburn, melena, nausea and vomiting.        Sometimes sees some slight blood  on outside of stool.  Does not fill toilet.  She thinks it is due to her hemorrhoid.  No pain or itching.    Genitourinary: Negative for dysuria, frequency and hematuria.   Musculoskeletal: Positive for joint pain. Negative for myalgias.        Sandeep knee off and on   Skin: Positive for itching. Negative for rash.        Itching to RT forearm if it rubs against something or scratches it.  No rash.  Rec loratadine 10 mg po daily prn.   Neurological: Negative for dizziness and headaches.   Endo/Heme/Allergies: Does not bruise/bleed easily.   Psychiatric/Behavioral: Positive for depression. The patient has insomnia. The patient is not nervous/anxious.         She lives with daughter and sometimes they have some difficulties She gets 5 hrs of sleep/night. She falls asleep ok but wakes up and can't get back to sleep. Gabapentin helps        Medications:  Outpatient Encounter Medications as of 7/8/2020   Medication Sig Note Dispense Refill    acetaminophen (TYLENOL) 325 MG tablet Take 325 mg by mouth every 6 (six) hours as needed for Pain.       aspirin (ECOTRIN) 81 MG EC tablet Take 81 mg by mouth once daily.       celecoxib (CELEBREX) 200 MG capsule celecoxib 200 mg capsule   TAKE 1 CAPSULE BY MOUTH TWICE A DAY       cyanocobalamin (VITAMIN B-12) 1000 MCG tablet Take 100 mcg by mouth once daily.       gabapentin (NEURONTIN) 100 MG capsule TAKE 1 TABLET BY MOUTH AT BEDTIME AS TOLERATED   1    hydroCHLOROthiazide (MICROZIDE) 12.5 mg capsule Take 1 capsule (12.5 mg total) by mouth once daily.  90 capsule 3    loratadine (CLARITIN) 10 mg tablet Take by mouth.       losartan (COZAAR) 50 MG tablet Take 0.5 tablets (25 mg total) by mouth once daily.  90 tablet 3    pantoprazole (PROTONIX) 40 MG tablet TK 1 T PO QD 4/29/2016: Received from: External Pharmacy  0    rosuvastatin (CRESTOR) 5 MG tablet TAKE 1 TABLET BY MOUTH EVERYDAY AT BEDTIME  90 tablet 0    triamcinolone acetonide 0.1% (KENALOG) 0.1 % ointment         [DISCONTINUED] hydroCHLOROthiazide (MICROZIDE) 12.5 mg capsule Take 1 capsule (12.5 mg total) by mouth once daily.  90 capsule 3    [DISCONTINUED] losartan (COZAAR) 50 MG tablet Take 0.5 tablets (25 mg total) by mouth once daily.  90 tablet 3     No facility-administered encounter medications on file as of 7/8/2020.        Current Outpatient Medications:     acetaminophen (TYLENOL) 325 MG tablet, Take 325 mg by mouth every 6 (six) hours as needed for Pain., Disp: , Rfl:     aspirin (ECOTRIN) 81 MG EC tablet, Take 81 mg by mouth once daily., Disp: , Rfl:     celecoxib (CELEBREX) 200 MG capsule, celecoxib 200 mg capsule  TAKE 1 CAPSULE BY MOUTH TWICE A DAY, Disp: , Rfl:     cyanocobalamin (VITAMIN B-12) 1000 MCG tablet, Take 100 mcg by mouth once daily., Disp: , Rfl:     gabapentin (NEURONTIN) 100 MG capsule, TAKE 1 TABLET BY MOUTH AT BEDTIME AS TOLERATED, Disp: , Rfl: 1    hydroCHLOROthiazide (MICROZIDE) 12.5 mg capsule, Take 1 capsule (12.5 mg total) by mouth once daily., Disp: 90 capsule, Rfl: 3    loratadine (CLARITIN) 10 mg tablet, Take by mouth., Disp: , Rfl:     losartan (COZAAR) 50 MG tablet, Take 0.5 tablets (25 mg total) by mouth once daily., Disp: 90 tablet, Rfl: 3    pantoprazole (PROTONIX) 40 MG tablet, TK 1 T PO QD, Disp: , Rfl: 0    rosuvastatin (CRESTOR) 5 MG tablet, TAKE 1 TABLET BY MOUTH EVERYDAY AT BEDTIME, Disp: 90 tablet, Rfl: 0    triamcinolone acetonide 0.1% (KENALOG) 0.1 % ointment, , Disp: , Rfl:     Allergies:  Review of patient's allergies indicates:   Allergen Reactions    Morphine      Itching/Hives    Penicillins        Health Maintenance:  Immunization History   Administered Date(s) Administered    Influenza - High Dose - PF (65 years and older) 10/15/2014, 09/24/2015, 09/06/2018, 10/01/2019    Pneumococcal Conjugate - 13 Valent 03/28/2017    Tdap 08/31/2015    Zoster Recombinant 10/01/2019, 01/15/2020      Health Maintenance   Topic Date Due    Pneumococcal Vaccine  "(65+ Low/Medium Risk) (2 of 2 - PPSV23) 03/28/2018    DEXA SCAN  09/27/2020    Mammogram  12/24/2020    Aspirin/Antiplatelet Therapy  07/08/2021    Colonoscopy  02/19/2025    Lipid Panel  06/19/2025    TETANUS VACCINE  08/31/2025      Objective:      Vital Signs  Temp: 97.8 °F (36.6 °C)  Pulse: 77  Resp: 18  SpO2: 99 %  BP: 118/64  BP Location: Left arm  Patient Position: Sitting  Pain Score: 0-No pain  Height and Weight  Height: 5' 3" (160 cm)  Weight: 75.5 kg (166 lb 7.2 oz)  BSA (Calculated - sq m): 1.83 sq meters  BMI (Calculated): 29.5  Weight in (lb) to have BMI = 25: 140.8]    Laboratory:    CMP:  Recent Labs   Lab 06/19/20  0859   Glucose 94   Calcium 10.8 H   Albumin 4.0  4.0   Total Protein 7.7  7.7   Sodium 140   Potassium 4.2   CO2 27   Chloride 105   BUN, Bld 18   Creatinine 1.0   eGFR if African American >60.0   eGFR if non African American 53.0 A   Alkaline Phosphatase 107  107   ALT 10  9 L   AST 16  15   Total Bilirubin 0.6  0.6     LIPIDS:  Recent Labs   Lab 06/19/20  0859   HDL 48   Cholesterol 166   Triglycerides 185 H   LDL Cholesterol 81.0   Hdl/Cholesterol Ratio 28.9   Non-HDL Cholesterol 118   Total Cholesterol/HDL Ratio 3.5       Urine Microalbumin/Cr:  Lab Results   Component Value Date    MICALBCREAT 9.6 07/25/2014       Physical Exam  Constitutional:       General: She is not in acute distress.     Appearance: Normal appearance. She is not ill-appearing, toxic-appearing or diaphoretic.   HENT:      Head: Normocephalic and atraumatic.      Right Ear: Tympanic membrane normal.      Left Ear: Tympanic membrane normal.   Eyes:      Extraocular Movements: Extraocular movements intact.      Conjunctiva/sclera: Conjunctivae normal.      Pupils: Pupils are equal, round, and reactive to light.   Neck:      Musculoskeletal: Normal range of motion. No muscular tenderness.      Vascular: No carotid bruit.      Comments: Thyroid nodule - NTTP - RLL  Cardiovascular:      Rate and Rhythm: " Normal rate and regular rhythm.      Pulses: Normal pulses.      Heart sounds: Normal heart sounds.      Comments: Occasional PAC  Pulmonary:      Effort: Pulmonary effort is normal.      Breath sounds: Normal breath sounds.   Abdominal:      General: Bowel sounds are normal. There is no distension.      Palpations: Abdomen is soft.      Tenderness: There is abdominal tenderness. There is no guarding or rebound.      Comments: TTP in epigastrum - no rebound, no guarding   Musculoskeletal: Normal range of motion.   Skin:     General: Skin is warm and dry.   Psychiatric:         Mood and Affect: Mood normal.         Behavior: Behavior normal.             Assessment:       1. Hypercalcemia    2. Essential hypertension    3. Pure hypercholesterolemia    4. BRBPR (bright red blood per rectum)    5. Neuropathy    6. Pruritus    7. Osteoarthritis of right knee, unspecified osteoarthritis type    8. Cigarette nicotine dependence without complication        Lori Pulido is a 80 y.o.female with:    1. Hypercalcemia  - Basic metabolic panel; Future  - Magnesium; Future  - Phosphorus; Future  - PTH, intact; Future  - Vitamin D; Future  -Repeat labs in a few wks.      2. Essential hypertension  - hydroCHLOROthiazide (MICROZIDE) 12.5 mg capsule; Take 1 capsule (12.5 mg total) by mouth once daily.  Dispense: 90 capsule; Refill: 3  - losartan (COZAAR) 50 MG tablet; Take 0.5 tablets (25 mg total) by mouth once daily.  Dispense: 90 tablet; Refill: 3  - Stable.  Cont current regimen.    3. Pure hypercholesterolemia  - Stable.  Cont current regimen.    4. BRBPR (bright red blood per rectum)  - CBC auto differential; Future  -Will check CBC.  Unsure if due to her hemorrhoids. Fu by Dr. Pappas. Cont Protonix.     5. Neuropathy  - Stable.  Cont current regimen. Ok to take 2 gabapentin QHS.     6. Pruritus  -Rec Loratadine 10 mg/d prn. Unsure if related to when she takes her pain meds.     7. Osteoarthritis of right knee,  unspecified osteoarthritis type  -Pt still recovering from TKA. Fu by Dr. Cuellar.     8. Cigarette nicotine dependence without complication  -Cont to enc cessation.      Chronic conditions status updated as per HPI.  Other than changes above, cont current medications and maintain follow up with specialists.  Return to clinic in 4 months or sooner if needed.    Fannie Du MD  [facility]

## 2020-07-08 ENCOUNTER — OFFICE VISIT (OUTPATIENT)
Dept: PRIMARY CARE CLINIC | Facility: CLINIC | Age: 80
End: 2020-07-08
Payer: MEDICARE

## 2020-07-08 VITALS
OXYGEN SATURATION: 99 % | DIASTOLIC BLOOD PRESSURE: 64 MMHG | SYSTOLIC BLOOD PRESSURE: 118 MMHG | BODY MASS INDEX: 29.49 KG/M2 | HEIGHT: 63 IN | TEMPERATURE: 98 F | HEART RATE: 77 BPM | WEIGHT: 166.44 LBS | RESPIRATION RATE: 18 BRPM

## 2020-07-08 DIAGNOSIS — M17.11 OSTEOARTHRITIS OF RIGHT KNEE, UNSPECIFIED OSTEOARTHRITIS TYPE: ICD-10-CM

## 2020-07-08 DIAGNOSIS — L29.9 PRURITUS: ICD-10-CM

## 2020-07-08 DIAGNOSIS — E78.00 PURE HYPERCHOLESTEROLEMIA: ICD-10-CM

## 2020-07-08 DIAGNOSIS — G62.9 NEUROPATHY: ICD-10-CM

## 2020-07-08 DIAGNOSIS — K62.5 BRBPR (BRIGHT RED BLOOD PER RECTUM): ICD-10-CM

## 2020-07-08 DIAGNOSIS — E83.52 HYPERCALCEMIA: Primary | ICD-10-CM

## 2020-07-08 DIAGNOSIS — I10 ESSENTIAL HYPERTENSION: ICD-10-CM

## 2020-07-08 DIAGNOSIS — F17.210 CIGARETTE NICOTINE DEPENDENCE WITHOUT COMPLICATION: ICD-10-CM

## 2020-07-08 PROCEDURE — 1159F PR MEDICATION LIST DOCUMENTED IN MEDICAL RECORD: ICD-10-PCS | Mod: S$GLB,,, | Performed by: INTERNAL MEDICINE

## 2020-07-08 PROCEDURE — 3078F DIAST BP <80 MM HG: CPT | Mod: CPTII,S$GLB,, | Performed by: INTERNAL MEDICINE

## 2020-07-08 PROCEDURE — 1101F PR PT FALLS ASSESS DOC 0-1 FALLS W/OUT INJ PAST YR: ICD-10-PCS | Mod: CPTII,S$GLB,, | Performed by: INTERNAL MEDICINE

## 2020-07-08 PROCEDURE — 99214 PR OFFICE/OUTPT VISIT, EST, LEVL IV, 30-39 MIN: ICD-10-PCS | Mod: S$GLB,,, | Performed by: INTERNAL MEDICINE

## 2020-07-08 PROCEDURE — 99999 PR PBB SHADOW E&M-EST. PATIENT-LVL IV: ICD-10-PCS | Mod: PBBFAC,,, | Performed by: INTERNAL MEDICINE

## 2020-07-08 PROCEDURE — 99999 PR PBB SHADOW E&M-EST. PATIENT-LVL IV: CPT | Mod: PBBFAC,,, | Performed by: INTERNAL MEDICINE

## 2020-07-08 PROCEDURE — 1126F AMNT PAIN NOTED NONE PRSNT: CPT | Mod: S$GLB,,, | Performed by: INTERNAL MEDICINE

## 2020-07-08 PROCEDURE — 99499 UNLISTED E&M SERVICE: CPT | Mod: S$GLB,,, | Performed by: INTERNAL MEDICINE

## 2020-07-08 PROCEDURE — 3078F PR MOST RECENT DIASTOLIC BLOOD PRESSURE < 80 MM HG: ICD-10-PCS | Mod: CPTII,S$GLB,, | Performed by: INTERNAL MEDICINE

## 2020-07-08 PROCEDURE — 99214 OFFICE O/P EST MOD 30 MIN: CPT | Mod: S$GLB,,, | Performed by: INTERNAL MEDICINE

## 2020-07-08 PROCEDURE — 3074F PR MOST RECENT SYSTOLIC BLOOD PRESSURE < 130 MM HG: ICD-10-PCS | Mod: CPTII,S$GLB,, | Performed by: INTERNAL MEDICINE

## 2020-07-08 PROCEDURE — 99499 RISK ADDL DX/OHS AUDIT: ICD-10-PCS | Mod: S$GLB,,, | Performed by: INTERNAL MEDICINE

## 2020-07-08 PROCEDURE — 3074F SYST BP LT 130 MM HG: CPT | Mod: CPTII,S$GLB,, | Performed by: INTERNAL MEDICINE

## 2020-07-08 PROCEDURE — 1126F PR PAIN SEVERITY QUANTIFIED, NO PAIN PRESENT: ICD-10-PCS | Mod: S$GLB,,, | Performed by: INTERNAL MEDICINE

## 2020-07-08 PROCEDURE — 1101F PT FALLS ASSESS-DOCD LE1/YR: CPT | Mod: CPTII,S$GLB,, | Performed by: INTERNAL MEDICINE

## 2020-07-08 PROCEDURE — 1159F MED LIST DOCD IN RCRD: CPT | Mod: S$GLB,,, | Performed by: INTERNAL MEDICINE

## 2020-07-08 RX ORDER — LOSARTAN POTASSIUM 50 MG/1
25 TABLET ORAL DAILY
Qty: 90 TABLET | Refills: 3 | Status: SHIPPED | OUTPATIENT
Start: 2020-07-08 | End: 2021-07-29

## 2020-07-08 RX ORDER — HYDROCHLOROTHIAZIDE 12.5 MG/1
12.5 CAPSULE ORAL DAILY
Qty: 90 CAPSULE | Refills: 3 | Status: SHIPPED | OUTPATIENT
Start: 2020-07-08 | End: 2021-07-09 | Stop reason: SDUPTHER

## 2020-07-17 ENCOUNTER — LAB VISIT (OUTPATIENT)
Dept: LAB | Facility: HOSPITAL | Age: 80
End: 2020-07-17
Attending: INTERNAL MEDICINE
Payer: MEDICARE

## 2020-07-17 DIAGNOSIS — E83.52 HYPERCALCEMIA: ICD-10-CM

## 2020-07-17 DIAGNOSIS — K62.5 BRBPR (BRIGHT RED BLOOD PER RECTUM): ICD-10-CM

## 2020-07-17 LAB
25(OH)D3+25(OH)D2 SERPL-MCNC: 21 NG/ML (ref 30–96)
ANION GAP SERPL CALC-SCNC: 8 MMOL/L (ref 8–16)
BASOPHILS # BLD AUTO: 0.07 K/UL (ref 0–0.2)
BASOPHILS NFR BLD: 0.9 % (ref 0–1.9)
BUN SERPL-MCNC: 16 MG/DL (ref 8–23)
CALCIUM SERPL-MCNC: 10.3 MG/DL (ref 8.7–10.5)
CHLORIDE SERPL-SCNC: 103 MMOL/L (ref 95–110)
CO2 SERPL-SCNC: 28 MMOL/L (ref 23–29)
CREAT SERPL-MCNC: 1.1 MG/DL (ref 0.5–1.4)
DIFFERENTIAL METHOD: ABNORMAL
EOSINOPHIL # BLD AUTO: 0.2 K/UL (ref 0–0.5)
EOSINOPHIL NFR BLD: 2.5 % (ref 0–8)
ERYTHROCYTE [DISTWIDTH] IN BLOOD BY AUTOMATED COUNT: 13 % (ref 11.5–14.5)
EST. GFR  (AFRICAN AMERICAN): 54.4 ML/MIN/1.73 M^2
EST. GFR  (NON AFRICAN AMERICAN): 47.2 ML/MIN/1.73 M^2
GLUCOSE SERPL-MCNC: 92 MG/DL (ref 70–110)
HCT VFR BLD AUTO: 34.8 % (ref 37–48.5)
HGB BLD-MCNC: 10.9 G/DL (ref 12–16)
IMM GRANULOCYTES # BLD AUTO: 0.07 K/UL (ref 0–0.04)
IMM GRANULOCYTES NFR BLD AUTO: 0.9 % (ref 0–0.5)
LYMPHOCYTES # BLD AUTO: 2.4 K/UL (ref 1–4.8)
LYMPHOCYTES NFR BLD: 30.9 % (ref 18–48)
MAGNESIUM SERPL-MCNC: 1.9 MG/DL (ref 1.6–2.6)
MCH RBC QN AUTO: 32.1 PG (ref 27–31)
MCHC RBC AUTO-ENTMCNC: 31.3 G/DL (ref 32–36)
MCV RBC AUTO: 102 FL (ref 82–98)
MONOCYTES # BLD AUTO: 0.5 K/UL (ref 0.3–1)
MONOCYTES NFR BLD: 6.8 % (ref 4–15)
NEUTROPHILS # BLD AUTO: 4.4 K/UL (ref 1.8–7.7)
NEUTROPHILS NFR BLD: 58 % (ref 38–73)
NRBC BLD-RTO: 0 /100 WBC
PHOSPHATE SERPL-MCNC: 3.4 MG/DL (ref 2.7–4.5)
PLATELET # BLD AUTO: 248 K/UL (ref 150–350)
PMV BLD AUTO: 12.1 FL (ref 9.2–12.9)
POTASSIUM SERPL-SCNC: 4.1 MMOL/L (ref 3.5–5.1)
PTH-INTACT SERPL-MCNC: 173 PG/ML (ref 9–77)
RBC # BLD AUTO: 3.4 M/UL (ref 4–5.4)
SODIUM SERPL-SCNC: 139 MMOL/L (ref 136–145)
WBC # BLD AUTO: 7.6 K/UL (ref 3.9–12.7)

## 2020-07-17 PROCEDURE — 85025 COMPLETE CBC W/AUTO DIFF WBC: CPT

## 2020-07-17 PROCEDURE — 83735 ASSAY OF MAGNESIUM: CPT

## 2020-07-17 PROCEDURE — 82306 VITAMIN D 25 HYDROXY: CPT

## 2020-07-17 PROCEDURE — 83970 ASSAY OF PARATHORMONE: CPT

## 2020-07-17 PROCEDURE — 80048 BASIC METABOLIC PNL TOTAL CA: CPT

## 2020-07-17 PROCEDURE — 84100 ASSAY OF PHOSPHORUS: CPT

## 2020-07-17 PROCEDURE — 36415 COLL VENOUS BLD VENIPUNCTURE: CPT | Mod: PN

## 2020-07-24 DIAGNOSIS — D53.9 MACROCYTIC ANEMIA: Primary | ICD-10-CM

## 2020-07-24 DIAGNOSIS — E21.3 HYPERPARATHYROIDISM: ICD-10-CM

## 2020-07-24 DIAGNOSIS — E55.9 VITAMIN D INSUFFICIENCY: ICD-10-CM

## 2020-07-24 RX ORDER — ERGOCALCIFEROL 1.25 MG/1
50000 CAPSULE ORAL
Qty: 12 CAPSULE | Refills: 0 | Status: SHIPPED | OUTPATIENT
Start: 2020-07-24 | End: 2020-10-12

## 2020-07-24 NOTE — PROGRESS NOTES
I reviewed pt's labs with her.  Her Vit D is low at 21.  Rec Ergocalciferol 50,000 units once weekly x 12 wks.  When complete she should start OTC Vit D 3 2000 units one daily and we should repeat her Vit D level.  Her iPTH is high at 173. She is not anemic.  Her calcium is 10.3 done from prev 10.8. Mg - 1.9.  Phos - 3.4 - wnl.  She is anemic.  H/H - 10.9/34.8  MCV - 102.  We will need to monitor and repeat her H/H in 2 wks.  Will set up for parathyroid scintigraph. I will send a copy to Dr. Pappas.    Dr. NUNEZ

## 2020-07-27 ENCOUNTER — TELEPHONE (OUTPATIENT)
Dept: PRIMARY CARE CLINIC | Facility: CLINIC | Age: 80
End: 2020-07-27

## 2020-07-27 NOTE — TELEPHONE ENCOUNTER
----- Message from Fannie Du MD sent at 7/24/2020  4:41 PM CDT -----  I reviewed pt's labs with her.  Her Vit D is low at 21.  Rec Ergocalciferol 50,000 units once weekly x 12 wks.  When complete she should start OTC Vit D 3 2000 units one daily and we should repeat her Vit D level.  Her iPTH is high at 173. She is not anemic.  Her calcium is 10.3 done from prev 10.8. Mg - 1.9.  Phos - 3.4 - wnl.  She is anemic.  H/H - 10.9/34.8  MCV - 102.  We will need to monitor and repeat her H/H in 2 wks.  Will set up for parathyroid scintigraph. I will send a copy to Dr. Pappas.    Dr. NUNEZ

## 2020-07-28 ENCOUNTER — TELEPHONE (OUTPATIENT)
Dept: PRIMARY CARE CLINIC | Facility: CLINIC | Age: 80
End: 2020-07-28

## 2020-07-28 NOTE — TELEPHONE ENCOUNTER
I sw pt. She was concerned about going to main campus for her NM scan. She does not like to drive there. She will see if she can get a ride

## 2020-07-28 NOTE — TELEPHONE ENCOUNTER
----- Message from Luly Duncan sent at 7/28/2020  2:10 PM CDT -----  Contact: Lori [phone]  Would like to receive medical advice.    Would they like a call back or a response via [facility]:  Call back    Additional information:  Calling to speak nurse regarding the pt upcoming procedure on 8-3-20.

## 2020-07-29 ENCOUNTER — TELEPHONE (OUTPATIENT)
Dept: CARDIOLOGY | Facility: CLINIC | Age: 80
End: 2020-07-29

## 2020-07-29 ENCOUNTER — LAB VISIT (OUTPATIENT)
Dept: LAB | Facility: HOSPITAL | Age: 80
End: 2020-07-29
Attending: INTERNAL MEDICINE
Payer: MEDICARE

## 2020-07-29 DIAGNOSIS — D53.9 MACROCYTIC ANEMIA: ICD-10-CM

## 2020-07-29 LAB
BASOPHILS # BLD AUTO: 0.06 K/UL (ref 0–0.2)
BASOPHILS NFR BLD: 1 % (ref 0–1.9)
DIFFERENTIAL METHOD: ABNORMAL
EOSINOPHIL # BLD AUTO: 0.2 K/UL (ref 0–0.5)
EOSINOPHIL NFR BLD: 3.2 % (ref 0–8)
ERYTHROCYTE [DISTWIDTH] IN BLOOD BY AUTOMATED COUNT: 13 % (ref 11.5–14.5)
HCT VFR BLD AUTO: 35.9 % (ref 37–48.5)
HGB BLD-MCNC: 11 G/DL (ref 12–16)
IMM GRANULOCYTES # BLD AUTO: 0.01 K/UL (ref 0–0.04)
IMM GRANULOCYTES NFR BLD AUTO: 0.2 % (ref 0–0.5)
LYMPHOCYTES # BLD AUTO: 2.4 K/UL (ref 1–4.8)
LYMPHOCYTES NFR BLD: 38.1 % (ref 18–48)
MCH RBC QN AUTO: 32 PG (ref 27–31)
MCHC RBC AUTO-ENTMCNC: 30.6 G/DL (ref 32–36)
MCV RBC AUTO: 104 FL (ref 82–98)
MONOCYTES # BLD AUTO: 0.4 K/UL (ref 0.3–1)
MONOCYTES NFR BLD: 6.8 % (ref 4–15)
NEUTROPHILS # BLD AUTO: 3.2 K/UL (ref 1.8–7.7)
NEUTROPHILS NFR BLD: 50.7 % (ref 38–73)
NRBC BLD-RTO: 0 /100 WBC
PLATELET # BLD AUTO: 201 K/UL (ref 150–350)
PMV BLD AUTO: 12.5 FL (ref 9.2–12.9)
RBC # BLD AUTO: 3.44 M/UL (ref 4–5.4)
WBC # BLD AUTO: 6.22 K/UL (ref 3.9–12.7)

## 2020-07-29 PROCEDURE — 36415 COLL VENOUS BLD VENIPUNCTURE: CPT | Mod: PN

## 2020-07-29 PROCEDURE — 85025 COMPLETE CBC W/AUTO DIFF WBC: CPT

## 2020-07-29 NOTE — TELEPHONE ENCOUNTER
Patient wanted Dr Stevens to know that she is going for thyroid testing at [facility] on Monday.    ----- Message from Emilie Fowler sent at 7/29/2020  1:34 PM CDT -----  Regarding: Call Back  Name of Who is Calling : QUYNH QUINTANILLA [[id number]]    Patient is requesting a call from staff in regards to updating the doctor on her care    .....Please contact to further discuss and advise.    Can the clinic reply by [facility] : No    What Number to Call Back :  [phone]

## 2020-07-29 NOTE — PROGRESS NOTES
I sent pt a my chart message -  I reviewed your labs.  Your Blood counts are stable from 12 days ago. You remain mildly anemic.     Dr. NUNEZ

## 2020-08-03 ENCOUNTER — HOSPITAL ENCOUNTER (OUTPATIENT)
Dept: RADIOLOGY | Facility: HOSPITAL | Age: 80
Discharge: HOME | End: 2020-08-03
Attending: INTERNAL MEDICINE
Payer: MEDICARE

## 2020-08-03 DIAGNOSIS — E21.3 HYPERPARATHYROIDISM: ICD-10-CM

## 2020-08-03 PROCEDURE — 78072 PARATHYRD PLANAR W/SPECT&CT: CPT | Mod: TC

## 2020-08-03 PROCEDURE — 78072 PARATHYRD PLANAR W/SPECT&CT: CPT | Mod: 26,,, | Performed by: RADIOLOGY

## 2020-08-03 PROCEDURE — 78072 NM PARATHYROID SCAN WITH SPECT AND CT: ICD-10-PCS | Mod: 26,,, | Performed by: RADIOLOGY

## 2020-08-03 NOTE — PROGRESS NOTES
I sent pt a my chart message -  I reviewed your parathyroid scan - It was normal and it showed No abnormal uptake to suggest parathyroid adenoma.    Dr. NUNEZ

## 2020-08-06 ENCOUNTER — TELEPHONE (OUTPATIENT)
Dept: PRIMARY CARE CLINIC | Facility: CLINIC | Age: 80
End: 2020-08-06

## 2020-08-06 NOTE — TELEPHONE ENCOUNTER
I sw pt and went over results with pt. She forgot her login information so [facility]. I sent her a link to re-activate

## 2020-08-06 NOTE — TELEPHONE ENCOUNTER
----- Message from Kelsey Serrano sent at 8/6/2020 12:38 PM CDT -----  Contact: Self [phone]  Would like to receive medical advice.    Would they like a call back or a response via [facility]:  call back    Additional information:  Calling to speak with the nurse regarding the results came back form nuclear medicine. Patient is requesting a call back regarding message.

## 2020-08-13 DIAGNOSIS — G62.9 NEUROPATHY: Primary | ICD-10-CM

## 2020-08-13 RX ORDER — GABAPENTIN 100 MG/1
CAPSULE ORAL
Qty: 180 CAPSULE | Refills: 1 | Status: SHIPPED | OUTPATIENT
Start: 2020-08-13 | End: 2021-02-07

## 2020-08-13 NOTE — TELEPHONE ENCOUNTER
This is first request and I have not refused the med.  I sent in refill.  Please inform pt.    Dr. NUNEZ

## 2020-08-13 NOTE — TELEPHONE ENCOUNTER
----- Message from Donavan Wallace sent at 8/13/2020  9:32 AM CDT -----  Regarding: Ms. SandovalJgrpvot-[phone]  Ms. Sandoval is requesting a callback.  She states that she was informed that the doctor couldn't refill her medicine: gabapentin (NEURONTIN) 100 MG capsule.  Ms. Sandoval states that she will be completely out of medicine on Saturday.  She would like to be advised on why the doctor can't fill the prescription.      Callback number: Ms. SandovalBkucvjr-[phone]

## 2020-09-03 ENCOUNTER — TELEPHONE (OUTPATIENT)
Dept: PRIMARY CARE CLINIC | Facility: CLINIC | Age: 80
End: 2020-09-03

## 2020-09-03 NOTE — TELEPHONE ENCOUNTER
----- Message from Wen Desai sent at 9/3/2020  8:44 AM CDT -----  Regarding: Pt self Mobile/Home [phone]  Patient would like a call back in regards to her wanting to know if she need to have blood work done to check her vitamin D?  Patient also would like to speak with you about her wanting to get a flu shot please.

## 2020-09-03 NOTE — TELEPHONE ENCOUNTER
I sw pt and informed her that it is too early to repeat her vit d.  wanted her to complete the 12wk course of vit d 27244 units weekly first. She expressed understanding

## 2020-09-15 ENCOUNTER — PATIENT OUTREACH (OUTPATIENT)
Dept: ADMINISTRATIVE | Facility: OTHER | Age: 80
End: 2020-09-15

## 2020-09-15 NOTE — PROGRESS NOTES
Health Maintenance Due   Topic Date Due    Pneumococcal Vaccine (65+ Low/Medium Risk) (2 of 2 - PPSV23) 03/28/2018    DEXA SCAN  09/27/2020     Updates were requested from care everywhere.  Chart was reviewed for overdue [facility]active [facility] ([facility]) topics (CRS, Breast Cancer Screening, Eye exam)  Health Maintenance has been updated.  LINKS immunization registry triggered.  Immunizations were reconciled.

## 2020-09-17 ENCOUNTER — OFFICE VISIT (OUTPATIENT)
Dept: CARDIOLOGY | Facility: CLINIC | Age: 80
End: 2020-09-17
Payer: MEDICARE

## 2020-09-17 VITALS
DIASTOLIC BLOOD PRESSURE: 83 MMHG | BODY MASS INDEX: 29.57 KG/M2 | WEIGHT: 166.88 LBS | HEIGHT: 63 IN | SYSTOLIC BLOOD PRESSURE: 135 MMHG | HEART RATE: 76 BPM

## 2020-09-17 DIAGNOSIS — D53.9 MACROCYTIC ANEMIA: ICD-10-CM

## 2020-09-17 DIAGNOSIS — E78.00 PURE HYPERCHOLESTEROLEMIA: ICD-10-CM

## 2020-09-17 DIAGNOSIS — I49.1 PREMATURE ATRIAL CONTRACTIONS: ICD-10-CM

## 2020-09-17 DIAGNOSIS — I10 ESSENTIAL HYPERTENSION: Primary | ICD-10-CM

## 2020-09-17 DIAGNOSIS — G60.9 IDIOPATHIC PERIPHERAL NEUROPATHY: ICD-10-CM

## 2020-09-17 DIAGNOSIS — E21.3 HYPERPARATHYROIDISM: ICD-10-CM

## 2020-09-17 DIAGNOSIS — E83.52 HYPERCALCEMIA: ICD-10-CM

## 2020-09-17 PROCEDURE — 3079F DIAST BP 80-89 MM HG: CPT | Mod: CPTII,S$GLB,, | Performed by: INTERNAL MEDICINE

## 2020-09-17 PROCEDURE — 99214 OFFICE O/P EST MOD 30 MIN: CPT | Mod: 25,S$GLB,, | Performed by: INTERNAL MEDICINE

## 2020-09-17 PROCEDURE — 99499 RISK ADDL DX/OHS AUDIT: ICD-10-PCS | Mod: S$GLB,,, | Performed by: INTERNAL MEDICINE

## 2020-09-17 PROCEDURE — 99214 PR OFFICE/OUTPT VISIT, EST, LEVL IV, 30-39 MIN: ICD-10-PCS | Mod: 25,S$GLB,, | Performed by: INTERNAL MEDICINE

## 2020-09-17 PROCEDURE — 93000 EKG 12-LEAD: ICD-10-PCS | Mod: S$GLB,,, | Performed by: INTERNAL MEDICINE

## 2020-09-17 PROCEDURE — 3075F PR MOST RECENT SYSTOLIC BLOOD PRESS GE 130-139MM HG: ICD-10-PCS | Mod: CPTII,S$GLB,, | Performed by: INTERNAL MEDICINE

## 2020-09-17 PROCEDURE — 1159F MED LIST DOCD IN RCRD: CPT | Mod: S$GLB,,, | Performed by: INTERNAL MEDICINE

## 2020-09-17 PROCEDURE — 99999 PR PBB SHADOW E&M-EST. PATIENT-LVL III: ICD-10-PCS | Mod: PBBFAC,,, | Performed by: INTERNAL MEDICINE

## 2020-09-17 PROCEDURE — 99999 PR PBB SHADOW E&M-EST. PATIENT-LVL III: CPT | Mod: PBBFAC,,, | Performed by: INTERNAL MEDICINE

## 2020-09-17 PROCEDURE — 3079F PR MOST RECENT DIASTOLIC BLOOD PRESSURE 80-89 MM HG: ICD-10-PCS | Mod: CPTII,S$GLB,, | Performed by: INTERNAL MEDICINE

## 2020-09-17 PROCEDURE — 99499 UNLISTED E&M SERVICE: CPT | Mod: S$GLB,,, | Performed by: INTERNAL MEDICINE

## 2020-09-17 PROCEDURE — 3075F SYST BP GE 130 - 139MM HG: CPT | Mod: CPTII,S$GLB,, | Performed by: INTERNAL MEDICINE

## 2020-09-17 PROCEDURE — 1159F PR MEDICATION LIST DOCUMENTED IN MEDICAL RECORD: ICD-10-PCS | Mod: S$GLB,,, | Performed by: INTERNAL MEDICINE

## 2020-09-17 PROCEDURE — 1101F PT FALLS ASSESS-DOCD LE1/YR: CPT | Mod: CPTII,S$GLB,, | Performed by: INTERNAL MEDICINE

## 2020-09-17 PROCEDURE — 93000 ELECTROCARDIOGRAM COMPLETE: CPT | Mod: S$GLB,,, | Performed by: INTERNAL MEDICINE

## 2020-09-17 PROCEDURE — 1101F PR PT FALLS ASSESS DOC 0-1 FALLS W/OUT INJ PAST YR: ICD-10-PCS | Mod: CPTII,S$GLB,, | Performed by: INTERNAL MEDICINE

## 2020-09-17 RX ORDER — DIPHENHYDRAMINE HCL 50 MG
25 CAPSULE ORAL
COMMUNITY
End: 2021-06-22

## 2020-09-17 RX ORDER — URSODIOL 250 MG/1
TABLET, FILM COATED ORAL
COMMUNITY
Start: 2020-09-10

## 2020-09-17 NOTE — PROGRESS NOTES
"  Subjective:      Patient ID: Lori Pulido is a 80 y.o. female.    Chief Complaint: Follow-up (Hypertension)    HPI:  Walks with cane 2 miles a day    Does exercises.    Review of Systems   Cardiovascular: Negative for chest pain, claudication, dyspnea on exertion, irregular heartbeat, leg swelling, near-syncope, orthopnea, palpitations and syncope.      Pt has chronic low back pain and sciatica on the left    Pt had gallstones removed even after cholecystectomy    Pt still has "sludge in duct"    Pt had an ERCP done, a stent was placed--pt had developed a cheloid from prior ERCP        Past Medical History:   Diagnosis Date    Allergic rhinitis     Choledocholithiasis     DVT (deep venous thrombosis)     after TKA    GERD (gastroesophageal reflux disease)     Hemorrhoids     Hyperlipidemia     Hypertension     Neuropathy     Nicotine dependence         Past Surgical History:   Procedure Laterality Date    APPENDECTOMY      BREAST SURGERY      lumpectomy    CARPAL TUNNEL RELEASE      CATARACT EXTRACTION BILATERAL W/ ANTERIOR VITRECTOMY       SECTION      HYSTERECTOMY      partial    LAPAROSCOPIC CHOLECYSTECTOMY WITH CHOLANGIOGRAPHY  2019    TOTAL KNEE ARTHROPLASTY      L knee       Family History   Problem Relation Age of Onset    Heart disease Father     Cancer Sister 89        breast cancer    Dementia Sister     Aortic dissection Sister        Social History     Socioeconomic History    Marital status:      Spouse name: Not on file    Number of children: Not on file    Years of education: Not on file    Highest education level: Not on file   Occupational History    Not on file   Social Needs    Financial resource strain: Not on file    Food insecurity     Worry: Not on file     Inability: Not on file    Transportation needs     Medical: Not on file     Non-medical: Not on file   Tobacco Use    Smoking status: Former Smoker     Years: 20.00     Types: " Cigarettes    Smokeless tobacco: Never Used    Tobacco comment: 1 pack every 2-3 wks   Substance and Sexual Activity    Alcohol use: No    Drug use: Never    Sexual activity: Not Currently   Lifestyle    Physical activity     Days per week: Not on file     Minutes per session: Not on file    Stress: Not on file   Relationships    Social connections     Talks on phone: Not on file     Gets together: Not on file     Attends Nondenominational service: Not on file     Active member of club or organization: Not on file     Attends meetings of clubs or organizations: Not on file     Relationship status: Not on file   Other Topics Concern    Not on file   Social History Narrative    Not on file       Current Outpatient Medications on File Prior to Visit   Medication Sig Dispense Refill    acetaminophen (TYLENOL) 325 MG tablet Take 325 mg by mouth every 6 (six) hours as needed for Pain.      aspirin (ECOTRIN) 81 MG EC tablet Take 81 mg by mouth once daily.      cyanocobalamin (VITAMIN B-12) 1000 MCG tablet Take 100 mcg by mouth once daily.      diphenhydrAMINE (BENADRYL) 50 MG capsule Take 25 mg by mouth.      ergocalciferol (ERGOCALCIFEROL) 50,000 unit Cap Take 1 capsule (50,000 Units total) by mouth every 7 days. 12 capsule 0    gabapentin (NEURONTIN) 100 MG capsule Take 1-2 capsules po  capsule 1    hydroCHLOROthiazide (MICROZIDE) 12.5 mg capsule Take 1 capsule (12.5 mg total) by mouth once daily. 90 capsule 3    loratadine (CLARITIN) 10 mg tablet Take by mouth.      losartan (COZAAR) 50 MG tablet Take 0.5 tablets (25 mg total) by mouth once daily. 90 tablet 3    pantoprazole (PROTONIX) 40 MG tablet TK 1 T PO QD  0    rosuvastatin (CRESTOR) 5 MG tablet TAKE 1 TABLET BY MOUTH EVERYDAY AT BEDTIME 90 tablet 0    triamcinolone acetonide 0.1% (KENALOG) 0.1 % ointment       ursodioL (ACTIGALL) 250 mg Tab TAKE TABLET BY MOUTH THREE TIMES A DAY      flu vac 2020 65up-[id number],PF, (FLUAD QUAD 2020-21,65Y  "UP,,PF,) 60 mcg (15 mcg x 4)/0.5 mL Syrg Inject in the muscle for one dose (Patient not taking: Reported on 9/17/2020) 0.5 mL 0    [DISCONTINUED] celecoxib (CELEBREX) 200 MG capsule celecoxib 200 mg capsule   TAKE 1 CAPSULE BY MOUTH TWICE A DAY       No current facility-administered medications on file prior to visit.        Review of patient's allergies indicates:   Allergen Reactions    Morphine      Itching/Hives    Penicillins      Objective:     Vitals:    09/17/20 0958   BP: 135/83   BP Location: Left arm   Patient Position: Sitting   BP Method: Large (Automatic)   Pulse: 76   Weight: 75.7 kg (166 lb 14.2 oz)   Height: 5' 3" (1.6 m)        Physical Exam   Constitutional: She is oriented to person, place, and time. She appears well-developed and well-nourished.   Eyes: No scleral icterus.   Neck: No JVD present. Carotid bruit is not present.   Cardiovascular: Normal rate and regular rhythm. Exam reveals no gallop.   No murmur heard.  Pulmonary/Chest: Breath sounds normal.   Abdominal: Soft. There is no abdominal tenderness.   Musculoskeletal:         General: No edema.   Neurological: She is alert and oriented to person, place, and time.   Skin: Skin is warm and dry.   Psychiatric: She has a normal mood and affect. Her behavior is normal. Judgment and thought content normal.   Vitals reviewed.     ECG today:  NSR, WNL    Wt down 14 lbs over the past 2 years    Lab Visit on 07/29/2020   Component Date Value Ref Range Status    WBC 07/29/2020 6.22  3.90 - 12.70 K/uL Final    RBC 07/29/2020 3.44* 4.00 - 5.40 M/uL Final    Hemoglobin 07/29/2020 11.0* 12.0 - 16.0 g/dL Final    Hematocrit 07/29/2020 35.9* 37.0 - 48.5 % Final    Mean Corpuscular Volume 07/29/2020 104* 82 - 98 fL Final    Mean Corpuscular Hemoglobin 07/29/2020 32.0* 27.0 - 31.0 pg Final    Mean Corpuscular Hemoglobin Conc 07/29/2020 30.6* 32.0 - 36.0 g/dL Final    RDW 07/29/2020 13.0  11.5 - 14.5 % Final    Platelets 07/29/2020 201  150 - " 350 K/uL Final    MPV 07/29/2020 12.5  9.2 - 12.9 fL Final    Immature Granulocytes 07/29/2020 0.2  0.0 - 0.5 % Final    Gran # (ANC) 07/29/2020 3.2  1.8 - 7.7 K/uL Final    Immature Grans (Abs) 07/29/2020 0.01  0.00 - 0.04 K/uL Final    Lymph # 07/29/2020 2.4  1.0 - 4.8 K/uL Final    Mono # 07/29/2020 0.4  0.3 - 1.0 K/uL Final    Eos # 07/29/2020 0.2  0.0 - 0.5 K/uL Final    Baso # 07/29/2020 0.06  0.00 - 0.20 K/uL Final    nRBC 07/29/2020 0  0 /100 WBC Final    Gran% 07/29/2020 50.7  38.0 - 73.0 % Final    Lymph% 07/29/2020 38.1  18.0 - 48.0 % Final    Mono% 07/29/2020 6.8  4.0 - 15.0 % Final    Eosinophil% 07/29/2020 3.2  0.0 - 8.0 % Final    Basophil% 07/29/2020 1.0  0.0 - 1.9 % Final    Differential Method 07/29/2020 Automated   Final   Lab Visit on 07/17/2020   Component Date Value Ref Range Status    Sodium 07/17/2020 139  136 - 145 mmol/L Final    Potassium 07/17/2020 4.1  3.5 - 5.1 mmol/L Final    Chloride 07/17/2020 103  95 - 110 mmol/L Final    CO2 07/17/2020 28  23 - 29 mmol/L Final    Glucose 07/17/2020 92  70 - 110 mg/dL Final    BUN, Bld 07/17/2020 16  8 - 23 mg/dL Final    Creatinine 07/17/2020 1.1  0.5 - 1.4 mg/dL Final    Calcium 07/17/2020 10.3  8.7 - 10.5 mg/dL Final    Anion Gap 07/17/2020 8  8 - 16 mmol/L Final    eGFR if African American 07/17/2020 54.4* >60 mL/min/1.73 m^2 Final    eGFR if non African American 07/17/2020 47.2* >60 mL/min/1.73 m^2 Final    Magnesium 07/17/2020 1.9  1.6 - 2.6 mg/dL Final    Phosphorus 07/17/2020 3.4  2.7 - 4.5 mg/dL Final    PTH, Intact 07/17/2020 173.0* 9.0 - 77.0 pg/mL Final    Vit D, 25-Hydroxy 07/17/2020 21* 30 - 96 ng/mL Final    WBC 07/17/2020 7.60  3.90 - 12.70 K/uL Final    RBC 07/17/2020 3.40* 4.00 - 5.40 M/uL Final    Hemoglobin 07/17/2020 10.9* 12.0 - 16.0 g/dL Final    Hematocrit 07/17/2020 34.8* 37.0 - 48.5 % Final    Mean Corpuscular Volume 07/17/2020 102* 82 - 98 fL Final    Mean Corpuscular Hemoglobin  07/17/2020 32.1* 27.0 - 31.0 pg Final    Mean Corpuscular Hemoglobin Conc 07/17/2020 31.3* 32.0 - 36.0 g/dL Final    RDW 07/17/2020 13.0  11.5 - 14.5 % Final    Platelets 07/17/2020 248  150 - 350 K/uL Final    MPV 07/17/2020 12.1  9.2 - 12.9 fL Final    Immature Granulocytes 07/17/2020 0.9* 0.0 - 0.5 % Final    Gran # (ANC) 07/17/2020 4.4  1.8 - 7.7 K/uL Final    Immature Grans (Abs) 07/17/2020 0.07* 0.00 - 0.04 K/uL Final    Lymph # 07/17/2020 2.4  1.0 - 4.8 K/uL Final    Mono # 07/17/2020 0.5  0.3 - 1.0 K/uL Final    Eos # 07/17/2020 0.2  0.0 - 0.5 K/uL Final    Baso # 07/17/2020 0.07  0.00 - 0.20 K/uL Final    nRBC 07/17/2020 0  0 /100 WBC Final    Gran% 07/17/2020 58.0  38.0 - 73.0 % Final    Lymph% 07/17/2020 30.9  18.0 - 48.0 % Final    Mono% 07/17/2020 6.8  4.0 - 15.0 % Final    Eosinophil% 07/17/2020 2.5  0.0 - 8.0 % Final    Basophil% 07/17/2020 0.9  0.0 - 1.9 % Final    Differential Method 07/17/2020 Automated   Final   Lab Visit on 06/19/2020   Component Date Value Ref Range Status    Sodium 06/19/2020 140  136 - 145 mmol/L Final    Potassium 06/19/2020 4.2  3.5 - 5.1 mmol/L Final    Chloride 06/19/2020 105  95 - 110 mmol/L Final    CO2 06/19/2020 27  23 - 29 mmol/L Final    Glucose 06/19/2020 94  70 - 110 mg/dL Final    BUN, Bld 06/19/2020 18  8 - 23 mg/dL Final    Creatinine 06/19/2020 1.0  0.5 - 1.4 mg/dL Final    Calcium 06/19/2020 10.8* 8.7 - 10.5 mg/dL Final    Total Protein 06/19/2020 7.7  6.0 - 8.4 g/dL Final    Albumin 06/19/2020 4.0  3.5 - 5.2 g/dL Final    Total Bilirubin 06/19/2020 0.6  0.1 - 1.0 mg/dL Final    Alkaline Phosphatase 06/19/2020 107  55 - 135 U/L Final    AST 06/19/2020 16  10 - 40 U/L Final    ALT 06/19/2020 10  10 - 44 U/L Final    Anion Gap 06/19/2020 8  8 - 16 mmol/L Final    eGFR if African American 06/19/2020 >60.0  >60 mL/min/1.73 m^2 Final    eGFR if non African American 06/19/2020 53.0* >60 mL/min/1.73 m^2 Final    Cholesterol  06/19/2020 166  120 - 199 mg/dL Final    Triglycerides 06/19/2020 185* 30 - 150 mg/dL Final    HDL 06/19/2020 48  40 - 75 mg/dL Final    LDL Cholesterol 06/19/2020 81.0  63.0 - 159.0 mg/dL Final    Hdl/Cholesterol Ratio 06/19/2020 28.9  20.0 - 50.0 % Final    Total Cholesterol/HDL Ratio 06/19/2020 3.5  2.0 - 5.0 Final    Non-HDL Cholesterol 06/19/2020 118  mg/dL Final    Total Protein 06/19/2020 7.7  6.0 - 8.4 g/dL Final    Albumin 06/19/2020 4.0  3.5 - 5.2 g/dL Final    Total Bilirubin 06/19/2020 0.6  0.1 - 1.0 mg/dL Final    Bilirubin, Direct 06/19/2020 0.3  0.1 - 0.3 mg/dL Final    AST 06/19/2020 15  10 - 40 U/L Final    ALT 06/19/2020 9* 10 - 44 U/L Final    Alkaline Phosphatase 06/19/2020 107  55 - 135 U/L Final   (    Note parathyroid scan last month was normal      Assessment:     1. Essential hypertension    2. Premature atrial contractions    3. Idiopathic peripheral neuropathy    4. Pure hypercholesterolemia    5. Hypercalcemia    6. Hyperparathyroidism    7. Macrocytic anemia      Plan:   Lori was seen today for follow-up.    Diagnoses and all orders for this visit:    Essential hypertension  -     IN OFFICE EKG 12-LEAD (to Muse)    Premature atrial contractions  -     IN OFFICE EKG 12-LEAD (to Muse)    Idiopathic peripheral neuropathy    Pure hypercholesterolemia    Hypercalcemia    Hyperparathyroidism    Macrocytic anemia  -     Vitamin B12; Future  -     Folate RBC; Future     Same meds    Check B12 level due to macrocytic anemia and peripheral neuropathy    Continue walking    Follow up in about 6 months (around 3/17/2021).

## 2020-10-12 ENCOUNTER — LAB VISIT (OUTPATIENT)
Dept: LAB | Facility: HOSPITAL | Age: 80
End: 2020-10-12
Attending: INTERNAL MEDICINE
Payer: MEDICARE

## 2020-10-12 ENCOUNTER — TELEPHONE (OUTPATIENT)
Dept: PRIMARY CARE CLINIC | Facility: CLINIC | Age: 80
End: 2020-10-12

## 2020-10-12 DIAGNOSIS — E55.9 VITAMIN D INSUFFICIENCY: ICD-10-CM

## 2020-10-12 DIAGNOSIS — E21.3 HYPERPARATHYROIDISM: ICD-10-CM

## 2020-10-12 DIAGNOSIS — E83.52 HYPERCALCEMIA: Primary | ICD-10-CM

## 2020-10-12 DIAGNOSIS — D53.9 MACROCYTIC ANEMIA: ICD-10-CM

## 2020-10-12 LAB — VIT B12 SERPL-MCNC: 1439 PG/ML (ref 210–950)

## 2020-10-12 PROCEDURE — 36415 COLL VENOUS BLD VENIPUNCTURE: CPT | Mod: PN

## 2020-10-12 PROCEDURE — 82607 VITAMIN B-12: CPT

## 2020-10-12 PROCEDURE — 82747 ASSAY OF FOLIC ACID RBC: CPT

## 2020-10-12 NOTE — TELEPHONE ENCOUNTER
----- Message from Cande Carbone sent at 10/12/2020  8:05 AM CDT -----  Regarding: advice  Contact: Patient [phone]  Patient is inquiring if she should have blood work to check Vitamin D as instructed in last visit.  Please advise is order should be placed.

## 2020-10-14 DIAGNOSIS — E55.9 VITAMIN D INSUFFICIENCY: ICD-10-CM

## 2020-10-14 RX ORDER — ERGOCALCIFEROL 1.25 MG/1
50000 CAPSULE ORAL
Qty: 3 CAPSULE | Refills: 0
Start: 2020-10-14 | End: 2020-10-28

## 2020-10-14 NOTE — TELEPHONE ENCOUNTER
Pt was confused b/c her vit D bottle says once weekly and we adv her last week to take once monthly. I informed pt that  did state she would like pt to decrease to once monthly. She expressed understanding

## 2020-10-15 LAB — FOLATE RBC-MCNC: 882 NG/ML (ref 280–791)

## 2020-11-09 ENCOUNTER — LAB VISIT (OUTPATIENT)
Dept: LAB | Facility: HOSPITAL | Age: 80
End: 2020-11-09
Payer: MEDICARE

## 2020-11-09 DIAGNOSIS — E21.3 HYPERPARATHYROIDISM: ICD-10-CM

## 2020-11-09 DIAGNOSIS — E83.52 HYPERCALCEMIA: ICD-10-CM

## 2020-11-09 DIAGNOSIS — E55.9 VITAMIN D INSUFFICIENCY: ICD-10-CM

## 2020-11-09 LAB
25(OH)D3+25(OH)D2 SERPL-MCNC: 30 NG/ML (ref 30–96)
PTH-INTACT SERPL-MCNC: 189 PG/ML (ref 9–77)

## 2020-11-09 PROCEDURE — 82306 VITAMIN D 25 HYDROXY: CPT

## 2020-11-09 PROCEDURE — 36415 COLL VENOUS BLD VENIPUNCTURE: CPT | Mod: PN

## 2020-11-09 PROCEDURE — 80048 BASIC METABOLIC PNL TOTAL CA: CPT

## 2020-11-09 PROCEDURE — 83970 ASSAY OF PARATHORMONE: CPT

## 2020-11-10 ENCOUNTER — TELEPHONE (OUTPATIENT)
Dept: PRIMARY CARE CLINIC | Facility: CLINIC | Age: 80
End: 2020-11-10

## 2020-11-10 DIAGNOSIS — E21.3 HYPERPARATHYROIDISM: ICD-10-CM

## 2020-11-10 DIAGNOSIS — E83.52 HYPERCALCEMIA: Primary | ICD-10-CM

## 2020-11-10 DIAGNOSIS — E55.9 VITAMIN D INSUFFICIENCY: ICD-10-CM

## 2020-11-10 LAB
ANION GAP SERPL CALC-SCNC: 9 MMOL/L (ref 8–16)
BUN SERPL-MCNC: 20 MG/DL (ref 8–23)
CALCIUM SERPL-MCNC: 10.8 MG/DL (ref 8.7–10.5)
CHLORIDE SERPL-SCNC: 100 MMOL/L (ref 95–110)
CO2 SERPL-SCNC: 28 MMOL/L (ref 23–29)
CREAT SERPL-MCNC: 1.1 MG/DL (ref 0.5–1.4)
EST. GFR  (AFRICAN AMERICAN): 54.4 ML/MIN/1.73 M^2
EST. GFR  (NON AFRICAN AMERICAN): 47.2 ML/MIN/1.73 M^2
GLUCOSE SERPL-MCNC: 81 MG/DL (ref 70–110)
POTASSIUM SERPL-SCNC: 4.1 MMOL/L (ref 3.5–5.1)
SODIUM SERPL-SCNC: 137 MMOL/L (ref 136–145)

## 2020-11-10 NOTE — TELEPHONE ENCOUNTER
Please inform pt. Lab called to tell me her Calcium is 10.8 rather than 10.2.  The lab had to recalibrate the machinery and this is the correct number. Her recent parathyroid scintigraphy was normal. Her iPTH and calcium remain high.  Could be related to secondary hyperparathyroidism from her CKD - III.  I would like to refer her to Hayley NUNEZ

## 2020-11-10 NOTE — TELEPHONE ENCOUNTER
Pt was informed and will further discuss at her visit tomorrow. Pt does not want to be scheduled with endo until she d/w

## 2020-11-10 NOTE — PROGRESS NOTES
I reviewed her labs and will d/w her at her visit this wk.  Her iPTH - 189 - elevated.  Vit D - 30 .  Renal function - Stable.     Dr. NUNEZ

## 2020-11-11 ENCOUNTER — IMMUNIZATION (OUTPATIENT)
Dept: PHARMACY | Facility: CLINIC | Age: 80
End: 2020-11-11
Payer: MEDICARE

## 2020-11-11 ENCOUNTER — OFFICE VISIT (OUTPATIENT)
Dept: PRIMARY CARE CLINIC | Facility: CLINIC | Age: 80
End: 2020-11-11
Payer: MEDICARE

## 2020-11-11 VITALS
OXYGEN SATURATION: 99 % | TEMPERATURE: 98 F | WEIGHT: 166.69 LBS | DIASTOLIC BLOOD PRESSURE: 64 MMHG | SYSTOLIC BLOOD PRESSURE: 123 MMHG | HEART RATE: 89 BPM | BODY MASS INDEX: 29.54 KG/M2 | RESPIRATION RATE: 18 BRPM | HEIGHT: 63 IN

## 2020-11-11 DIAGNOSIS — E21.3 HYPERPARATHYROIDISM: ICD-10-CM

## 2020-11-11 DIAGNOSIS — L29.9 PRURITUS: ICD-10-CM

## 2020-11-11 DIAGNOSIS — Z78.0 POSTMENOPAUSAL: ICD-10-CM

## 2020-11-11 DIAGNOSIS — E83.52 HYPERCALCEMIA: ICD-10-CM

## 2020-11-11 DIAGNOSIS — Z12.31 SCREENING MAMMOGRAM, ENCOUNTER FOR: ICD-10-CM

## 2020-11-11 DIAGNOSIS — D53.9 MACROCYTIC ANEMIA: ICD-10-CM

## 2020-11-11 DIAGNOSIS — I10 ESSENTIAL HYPERTENSION: Primary | ICD-10-CM

## 2020-11-11 DIAGNOSIS — E78.00 PURE HYPERCHOLESTEROLEMIA: ICD-10-CM

## 2020-11-11 DIAGNOSIS — F17.210 CIGARETTE NICOTINE DEPENDENCE WITHOUT COMPLICATION: ICD-10-CM

## 2020-11-11 PROCEDURE — 3074F SYST BP LT 130 MM HG: CPT | Mod: CPTII,S$GLB,, | Performed by: INTERNAL MEDICINE

## 2020-11-11 PROCEDURE — 1101F PR PT FALLS ASSESS DOC 0-1 FALLS W/OUT INJ PAST YR: ICD-10-PCS | Mod: CPTII,S$GLB,, | Performed by: INTERNAL MEDICINE

## 2020-11-11 PROCEDURE — 99499 RISK ADDL DX/OHS AUDIT: ICD-10-PCS | Mod: S$GLB,,, | Performed by: INTERNAL MEDICINE

## 2020-11-11 PROCEDURE — 1126F PR PAIN SEVERITY QUANTIFIED, NO PAIN PRESENT: ICD-10-PCS | Mod: S$GLB,,, | Performed by: INTERNAL MEDICINE

## 2020-11-11 PROCEDURE — 1159F MED LIST DOCD IN RCRD: CPT | Mod: S$GLB,,, | Performed by: INTERNAL MEDICINE

## 2020-11-11 PROCEDURE — 1101F PT FALLS ASSESS-DOCD LE1/YR: CPT | Mod: CPTII,S$GLB,, | Performed by: INTERNAL MEDICINE

## 2020-11-11 PROCEDURE — 3074F PR MOST RECENT SYSTOLIC BLOOD PRESSURE < 130 MM HG: ICD-10-PCS | Mod: CPTII,S$GLB,, | Performed by: INTERNAL MEDICINE

## 2020-11-11 PROCEDURE — 99999 PR PBB SHADOW E&M-EST. PATIENT-LVL V: ICD-10-PCS | Mod: PBBFAC,,, | Performed by: INTERNAL MEDICINE

## 2020-11-11 PROCEDURE — 3078F PR MOST RECENT DIASTOLIC BLOOD PRESSURE < 80 MM HG: ICD-10-PCS | Mod: CPTII,S$GLB,, | Performed by: INTERNAL MEDICINE

## 2020-11-11 PROCEDURE — 99214 OFFICE O/P EST MOD 30 MIN: CPT | Mod: S$GLB,,, | Performed by: INTERNAL MEDICINE

## 2020-11-11 PROCEDURE — 99499 UNLISTED E&M SERVICE: CPT | Mod: S$GLB,,, | Performed by: INTERNAL MEDICINE

## 2020-11-11 PROCEDURE — 99214 PR OFFICE/OUTPT VISIT, EST, LEVL IV, 30-39 MIN: ICD-10-PCS | Mod: S$GLB,,, | Performed by: INTERNAL MEDICINE

## 2020-11-11 PROCEDURE — 99999 PR PBB SHADOW E&M-EST. PATIENT-LVL V: CPT | Mod: PBBFAC,,, | Performed by: INTERNAL MEDICINE

## 2020-11-11 PROCEDURE — 1159F PR MEDICATION LIST DOCUMENTED IN MEDICAL RECORD: ICD-10-PCS | Mod: S$GLB,,, | Performed by: INTERNAL MEDICINE

## 2020-11-11 PROCEDURE — 3078F DIAST BP <80 MM HG: CPT | Mod: CPTII,S$GLB,, | Performed by: INTERNAL MEDICINE

## 2020-11-11 PROCEDURE — 1126F AMNT PAIN NOTED NONE PRSNT: CPT | Mod: S$GLB,,, | Performed by: INTERNAL MEDICINE

## 2020-11-11 RX ORDER — ROSUVASTATIN CALCIUM 5 MG/1
5 TABLET, COATED ORAL DAILY
Qty: 90 TABLET | Refills: 3 | Status: SHIPPED | OUTPATIENT
Start: 2020-11-11 | End: 2021-05-12

## 2020-11-11 RX ORDER — A/SINGAPORE/GP1908/2015 IVR-180 (AN A/MICHIGAN/45/2015 (H1N1)PDM09-LIKE VIRUS, A/HONG KONG/4801/2014, NYMC X-263B (H3N2) (AN A/HONG KONG/4801/2014-LIKE VIRUS), AND B/BRISBANE/60/2008, WILD TYPE (A B/BRISBANE/60/2008-LIKE VIRUS) 15; 15; 15 UG/.5ML; UG/.5ML; UG/.5ML
INJECTION, SUSPENSION INTRAMUSCULAR
COMMUNITY
End: 2021-02-11

## 2020-11-11 NOTE — PROGRESS NOTES
"[facility] Note    Chief Complaint      Chief Complaint   Patient presents with    Follow-up       History of Present Illness      Lori Pulido is a 80 y.o. AAF with HTN, HLD, GERD, Choledocholithiasis, OA presents to re-est care and to fu chronic issues and for pre-op clearance for RT TKA sched for 10/28/19 with Dr. Cuellar.  Last visit - 7/8/20     Hypercalcemia/ Hyperparathyroidism - Her Vit D was low at 21 so we tx her with Ergocalciferol 50,000 units once weekly x 12 wks.  She then started OTC Vit D 3 2000 units one daily and we repeated her Vit D level - 30.  Her iPTH was high at 173 and on repeat went up to 189.   Her calcium is 10.8 and has been 10.3- 10.8. Mg - 1.9.  Phos - 3.4 - wnl.  Parathyroid scintigraphy was normal.  Could be related to secondary hyperparathyroidism from her CKD - III but I d/w renal and they rec a referral to Endo.     Mild anemia - She is anemic.  H/H - 11/35.9  MCV - 104.  We will need to monitor and repeat her H/H in 2 wks.         HTN - Pt controlled on Losartan 25 mg/d, and HCTZ 12.5 mg/d. Fu by [facility], Dr. Stevens whom she saw in Mar. Cardiolite stress test earlier this year was normal with normal LVEF.     HLD - Pt on Crestor 5 mg QHS. Controlled.  Repeat in June.      Vit B12 def- Pt on Vit B12 1000 mcg/d. MCV - 104. Vit B12 - 1439 up from prev B12 - 268 and folate 882.  Neither were deficient.  10/12/20.      Neuropathy - Pt is on Gabapentin 100-200 mg QHS.      Nicotine dependence - Pt is a current some day smoker. 4-5 cig/d. Rec cessation. "I can really get off of them". She declines further intervention for assistnace with smoking cessation.      GERD - Pt on Protonix 40 mg po Prn. Still an issue. Prilosec no help.      Choledocholithiasis - Pt had repeat ERCP/Sphincterotomy/pappilotomy  with Dr. Garay.  She had a stent placed. No further pain since procedure. Note pt has had ERCP followed by cholecystectomy followed by recurrent ERCP for retained " "stones and required PICC line for sepsis afterwards     Oa - Fu by Dr. Cuellar.  Pt s/p RT TKA on 10/28/19. She saw him 2 wks ago.  Stable.      CKD II - BUN/CR - .1 GFR - 54. Prev GFR - 55-70     Sciatica- Fu by Dr. Sin. Pt tried Diclofenac, Lyrica, Gabapentin, Hydrocodone, and Tyelnol #3 in past - no help.  She takes Tylenol #4 sparingly.      MNG - Largest nodule - 1.6 cm - reassured by Dr. Kearney. Will get records to review     Urinary incontinence - Wears pads sometimes but hasn't needed lately.  Kegals help. "I'm ok".      Insomnia - She feels gabapentin helps and sometimes takes a 2nd dose.  She drinks sleepy time tea.  She sometimes takes benadryl.  I cautioned her about this. She wakes at 4:30 most days and goes to bed at 10:30 and wakes to urinate at 2:30 and has trouble falling back asleep at times.     Memory Issues - RPR - NR.  TSH - wnl. B12 -back to nl on vit B12 suppl. She fu with Neuro and was reassured. She feels it is worsening and plans to fu with Dr. Maria again.      HCM - Flu - 9/3/20;   Td - 8/31/15;  PCV 13 - 3/28/17;  PVX 23 - ?;  Shingrix - 10/1/19 and 1/15/20;  PAP - no longer gets;  MGM - 19;  DEXA - 17;  C-scope - 20-  polyps - repeat 5 yrs;  CRS - Dr. Morales;  GI - Dr. Pappas; Cards - Dr. Stevens; Ortho - Dr. Cuellar; Derm - Dr. Rausch; Neuro - Dr. Maria      Past Medical History:  Past Medical History:   Diagnosis Date    Allergic rhinitis     Choledocholithiasis     DVT (deep venous thrombosis)     after TKA    GERD (gastroesophageal reflux disease)     Hemorrhoids     Hyperlipidemia     Hypertension     Neuropathy     Nicotine dependence        Past Surgical History:   has a past surgical history that includes  section; Appendectomy; Cataract extraction bilateral w/ anterior vitrectomy; Carpal tunnel release; Breast surgery; Hysterectomy; Laparoscopic cholecystectomy with cholangiography (2019); and Total knee " "arthroplasty.    Family History:  family history includes Aortic dissection in her sister; Cancer (age of onset: 89) in her sister; Dementia in her sister; Heart disease in her father.     Social History:  Social History     Tobacco Use    Smoking status: Former Smoker     Years: 20.00     Types: Cigarettes    Smokeless tobacco: Never Used    Tobacco comment: 1 pack every 2-3 wks   Substance Use Topics    Alcohol use: No    Drug use: Never       Review of Systems   Constitutional: Negative for chills, diaphoresis and fever.        All of a sudden I get a hot flash after using the restroom early in the morning   HENT: Positive for tinnitus. Negative for hearing loss.         "[place] sound sometimes".    Eyes: Positive for blurred vision. Negative for double vision.        Wears glasses.  Sees Ophtho. Has fu this month   Respiratory: Positive for cough. Negative for shortness of breath.         Rarecough with allergies   Cardiovascular: Negative for chest pain and palpitations.   Gastrointestinal: Positive for blood in stool, constipation and heartburn. Negative for abdominal pain, diarrhea, melena, nausea and vomiting.        Soemtimes blood on outside of stool benjy after constipation.  She has int hemorrhoids. She takes Metamucil prn   Genitourinary: Negative for dysuria and frequency.   Musculoskeletal: Positive for joint pain. Negative for myalgias.        "I got arthritis all over"   Skin: Positive for itching. Negative for rash.        Rt proximal arm moved up from her hand.  No rash   Neurological: Negative for dizziness and headaches.   Endo/Heme/Allergies: Does not bruise/bleed easily.   Psychiatric/Behavioral: Negative for depression. The patient is nervous/anxious.         "It's too much that's going on and I don't sleep well".         Medications:  Outpatient Encounter Medications as of 11/11/2020   Medication Sig Note Dispense Refill    acetaminophen (TYLENOL) 325 MG tablet Take 325 mg by mouth " every 6 (six) hours as needed for Pain.       aspirin (ECOTRIN) 81 MG EC tablet Take 81 mg by mouth once daily.       cyanocobalamin (VITAMIN B-12) 1000 MCG tablet Take 100 mcg by mouth once daily.       diphenhydrAMINE (BENADRYL) 50 MG capsule Take 25 mg by mouth.       ergocalciferol (ERGOCALCIFEROL) 50,000 unit Cap Take 1 capsule (50,000 Units total) by mouth every 30 days.  3 capsule 1    flu vac 2020 65up-[id number],PF, (FLUAD QUAD 2020-21,65Y UP,,PF,) 60 mcg (15 mcg x 4)/0.5 mL Syrg Inject in the muscle for one dose  0.5 mL 0    flu vac 2020 65up-hmaLS70P,PF, (FLUAD QUAD 2020-21,65Y UP,,PF,) 60 mcg (15 mcg x 4)/0.5 mL Syrg Fluad Quad 8556-1558(65yr up)(PF) 60 mcg (15 mcg x 4)/0.5mL IM syringe   PHARMACY ADMINISTERED       gabapentin (NEURONTIN) 100 MG capsule Take 1-2 capsules po QHS  180 capsule 1    hydroCHLOROthiazide (MICROZIDE) 12.5 mg capsule Take 1 capsule (12.5 mg total) by mouth once daily.  90 capsule 3    loratadine (CLARITIN) 10 mg tablet Take by mouth.       losartan (COZAAR) 50 MG tablet Take 0.5 tablets (25 mg total) by mouth once daily.  90 tablet 3    pantoprazole (PROTONIX) 40 MG tablet TK 1 T PO QD 4/29/2016: Received from: External Pharmacy  0    rosuvastatin (CRESTOR) 5 MG tablet Take 1 tablet (5 mg total) by mouth once daily.  90 tablet 3    triamcinolone acetonide 0.1% (KENALOG) 0.1 % ointment        ursodioL (ACTIGALL) 250 mg Tab TAKE TABLET BY MOUTH THREE TIMES A DAY       [DISCONTINUED] rosuvastatin (CRESTOR) 5 MG tablet TAKE 1 TABLET BY MOUTH EVERYDAY AT BEDTIME  90 tablet 0    [DISCONTINUED] ergocalciferol (ERGOCALCIFEROL) 50,000 unit Cap Take 1 capsule (50,000 Units total) by mouth every 30 days.  3 capsule 0     No facility-administered encounter medications on file as of 11/11/2020.        Current Outpatient Medications:     acetaminophen (TYLENOL) 325 MG tablet, Take 325 mg by mouth every 6 (six) hours as needed for Pain., Disp: , Rfl:     aspirin (ECOTRIN) 81 MG  EC tablet, Take 81 mg by mouth once daily., Disp: , Rfl:     cyanocobalamin (VITAMIN B-12) 1000 MCG tablet, Take 100 mcg by mouth once daily., Disp: , Rfl:     diphenhydrAMINE (BENADRYL) 50 MG capsule, Take 25 mg by mouth., Disp: , Rfl:     ergocalciferol (ERGOCALCIFEROL) 50,000 unit Cap, Take 1 capsule (50,000 Units total) by mouth every 30 days., Disp: 3 capsule, Rfl: 1    flu vac 2020 65up-[id number],PF, (FLUAD QUAD 2020-21,65Y UP,,PF,) 60 mcg (15 mcg x 4)/0.5 mL Syrg, Inject in the muscle for one dose, Disp: 0.5 mL, Rfl: 0    flu vac 2020 65up-[id number],PF, (FLUAD QUAD 2020-21,65Y UP,,PF,) 60 mcg (15 mcg x 4)/0.5 mL Syrg, Fluad Quad [id number](65yr up)(PF) 60 mcg (15 mcg x 4)/0.5mL IM syringe  PHARMACY ADMINISTERED, Disp: , Rfl:     gabapentin (NEURONTIN) 100 MG capsule, Take 1-2 capsules po QHS, Disp: 180 capsule, Rfl: 1    hydroCHLOROthiazide (MICROZIDE) 12.5 mg capsule, Take 1 capsule (12.5 mg total) by mouth once daily., Disp: 90 capsule, Rfl: 3    loratadine (CLARITIN) 10 mg tablet, Take by mouth., Disp: , Rfl:     losartan (COZAAR) 50 MG tablet, Take 0.5 tablets (25 mg total) by mouth once daily., Disp: 90 tablet, Rfl: 3    pantoprazole (PROTONIX) 40 MG tablet, TK 1 T PO QD, Disp: , Rfl: 0    rosuvastatin (CRESTOR) 5 MG tablet, Take 1 tablet (5 mg total) by mouth once daily., Disp: 90 tablet, Rfl: 3    triamcinolone acetonide 0.1% (KENALOG) 0.1 % ointment, , Disp: , Rfl:     ursodioL (ACTIGALL) 250 mg Tab, TAKE TABLET BY MOUTH THREE TIMES A DAY, Disp: , Rfl:     Allergies:  Review of patient's allergies indicates:   Allergen Reactions    Morphine      Itching/Hives    Penicillins        Health Maintenance:  Immunization History   Administered Date(s) Administered    Influenza (FLUAD) - Quadrivalent - Adjuvanted - PF *Preferred* (65+) 09/03/2020    Influenza - High Dose - PF (65 years and older) 10/15/2014, 09/24/2015, 09/06/2018, 10/01/2019    Pneumococcal Conjugate - 13 Valent 03/28/2017     "Tdap 08/31/2015    Zoster Recombinant 10/01/2019, 01/15/2020      Health Maintenance   Topic Date Due    Pneumococcal Vaccine (65+ Low/Medium Risk) (2 of 2 - PPSV23) 03/28/2018    DEXA SCAN  09/27/2020    Mammogram  12/24/2020    Aspirin/Antiplatelet Therapy  11/11/2021    Colonoscopy  02/19/2025    Lipid Panel  06/19/2025    TETANUS VACCINE  08/31/2025      Objective:      Vital Signs  Temp: 97.9 °F (36.6 °C)  Pulse: 89  Resp: 18  SpO2: 99 %  BP: 123/64  BP Location: Left arm  Patient Position: Sitting  Pain Score: 0-No pain  Height and Weight  Height: 5' 3" (160 cm)  Weight: 75.6 kg (166 lb 10.7 oz)  BSA (Calculated - sq m): 1.83 sq meters  BMI (Calculated): 29.5  Weight in (lb) to have BMI = 25: 140.8]    Laboratory:  CBC:  Recent Labs   Lab 07/17/20  0710 07/29/20  0731   WBC 7.60 6.22   RBC 3.40 L 3.44 L   Hemoglobin 10.9 L 11.0 L   Hematocrit 34.8 L 35.9 L   Platelets 248 201    H 104 H   MCH 32.1 H 32.0 H   MCHC 31.3 L 30.6 L       CMP:  Recent Labs   Lab 06/19/20  0859  11/09/20  0813   Glucose 94   < > 81   Calcium 10.8 H   < > 10.8 H   Albumin 4.0  4.0  --   --    Total Protein 7.7  7.7  --   --    Sodium 140   < > 137   Potassium 4.2   < > 4.1   CO2 27   < > 28   Chloride 105   < > 100   BUN 18   < > 20   Creatinine 1.0   < > 1.1   eGFR if  >60.0   < > 54.4 A   eGFR if non  53.0 A   < > 47.2 A   Alkaline Phosphatase 107  107  --   --    ALT 10  9 L  --   --    AST 16  15  --   --    Total Bilirubin 0.6  0.6  --   --     < > = values in this interval not displayed.       LIPIDS:  Recent Labs   Lab 06/19/20  0859   HDL 48   Cholesterol 166   Triglycerides 185 H   LDL Cholesterol 81.0   HDL/Cholesterol Ratio 28.9   Non-HDL Cholesterol 118   Total Cholesterol/HDL Ratio 3.5     Urine Microalbumin/Cr:  Lab Results   Component Value Date    MICALBCREAT 9.6 07/25/2014       Other:   Lab Results   Component Value Date    IBYAMGNX74GN 30 11/09/2020    " CCCAHJNF26HP 21 (L) 07/17/2020    IGGJHIHU79 1439 (H) 10/12/2020     Physical Exam  Vitals signs reviewed.   Constitutional:       General: She is not in acute distress.     Appearance: Normal appearance. She is obese. She is not ill-appearing, toxic-appearing or diaphoretic.   HENT:      Head: Normocephalic and atraumatic.      Right Ear: Tympanic membrane normal.      Left Ear: Tympanic membrane normal.   Eyes:      Extraocular Movements: Extraocular movements intact.      Conjunctiva/sclera: Conjunctivae normal.      Pupils: Pupils are equal, round, and reactive to light.      Comments: violette arcus senilis   Neck:      Musculoskeletal: No muscular tenderness.      Vascular: No carotid bruit.      Comments: Thyroid nodule - NTTP  Cardiovascular:      Rate and Rhythm: Normal rate and regular rhythm.      Heart sounds: Murmur present.      Comments: I/VI sys murmur at LUSB  Pulmonary:      Effort: No respiratory distress.      Breath sounds: Normal breath sounds. No wheezing.   Abdominal:      General: Bowel sounds are normal. There is no distension.      Palpations: Abdomen is soft.      Tenderness: There is no abdominal tenderness. There is no guarding or rebound.   Skin:     General: Skin is warm and dry.      Findings: No rash.   Neurological:      General: No focal deficit present.      Mental Status: She is alert and oriented to person, place, and time.   Psychiatric:         Mood and Affect: Mood normal.         Behavior: Behavior normal.             Assessment:       1. Essential hypertension    2. Pure hypercholesterolemia    3. Hypercalcemia    4. Hyperparathyroidism    5. Macrocytic anemia    6. Cigarette nicotine dependence without complication    7. Pruritus    8. Postmenopausal    9. Screening mammogram, encounter for        Lori BACK Ashok is a 80 y.o.female with:    1. Essential hypertension   - Controlled.  Cont current.     2. Pure hypercholesterolemia  - rosuvastatin (CRESTOR) 5 MG tablet; Take 1  tablet (5 mg total) by mouth once daily.  Dispense: 90 tablet; Refill: 3   - Controlled.  Cont current.     3. Hypercalcemia  - Unsure of etiol (primary vs secondary hyperparathyroidism).  Refer to Endo.  Pt not on calcium suppl.  Vit D back to low nl.  Scintigraphy was nl.     4. Hyperparathyroidism  - Unsure of etiol (primary vs secondary).  Refer to Endo.  Pt not on calcium suppl.  Vit D back to low nl.  Scintigraphy was nl.     5. Macrocytic anemia  - B12 and folate wnl.  Prev TSH wnl.  Stable. Cont to monitor.     6. Cigarette nicotine dependence without complication  - Cont to enc cessation.     7. Pruritus  - Unsure of etiol.  Consider Derm referral.  No rash present.  Not dry skin. Rec can take Claritin or Zyrtec at night.     8. Postmenopausal  - DXA Bone Density Spine And Hip; Future  - Due.  Will order.    9. Screening mammogram, encounter for  - Mammo Digital Screening Bilat; Future  - Due in Dec.  Will order.        Chronic conditions status updated as per HPI.  Other than changes above, cont current medications and maintain follow up with specialists.  Return to clinic in 3 months or sooner if needed.    Fannie Du MD  [facility]

## 2020-11-12 ENCOUNTER — TELEPHONE (OUTPATIENT)
Dept: PRIMARY CARE CLINIC | Facility: CLINIC | Age: 80
End: 2020-11-12

## 2020-11-12 NOTE — TELEPHONE ENCOUNTER
I informed pt that she has a refill on file at [facility]. She will let me know if she has problems getting it filled

## 2020-11-12 NOTE — TELEPHONE ENCOUNTER
----- Message from Wen Desai sent at 11/12/2020  9:46 AM CST -----  Regarding: Pt self Mobile/Home [phone]  Requesting an RX refill or new RX.  Is this a refill or new RX: Refill  RX name and strength:gabapentin (NEURONTIN) 100 MG capsule  Is this a 30 day or 90 day RX: 90  Pharmacy name and phone # [id number] - [facility] - [address]  [phone] Fax# [phone]

## 2020-11-14 DIAGNOSIS — E55.9 VITAMIN D INSUFFICIENCY: ICD-10-CM

## 2020-11-15 RX ORDER — ERGOCALCIFEROL 1.25 MG/1
CAPSULE ORAL
Qty: 3 CAPSULE | Refills: 1 | OUTPATIENT
Start: 2020-11-15

## 2020-11-15 NOTE — TELEPHONE ENCOUNTER
Pt completed her 12 wks of Ergocalciferol once weekly.  I want her to take OTC Vit D 3 1000u/d. Please inform her.    Dr. NUNEZ

## 2020-12-31 ENCOUNTER — TELEPHONE (OUTPATIENT)
Dept: PRIMARY CARE CLINIC | Facility: CLINIC | Age: 80
End: 2020-12-31

## 2020-12-31 DIAGNOSIS — Z12.31 SCREENING MAMMOGRAM, ENCOUNTER FOR: Primary | ICD-10-CM

## 2020-12-31 NOTE — TELEPHONE ENCOUNTER
----- Message from Sierra Osorio sent at 12/31/2020 10:45 AM CST -----  Regarding: [facility] called today and said that they have an order for the patient's screening mammogram but they need 3D added to it. The order can be faxed to [facility] at [phone]  Contact: [phone]  [facility] called today and said that they have an order for the patient's screening mammogram but they need 3D added to it. The order can be faxed to [facility] at [phone]

## 2021-01-12 ENCOUNTER — PATIENT OUTREACH (OUTPATIENT)
Dept: ADMINISTRATIVE | Facility: HOSPITAL | Age: 81
End: 2021-01-12

## 2021-01-20 ENCOUNTER — TELEPHONE (OUTPATIENT)
Dept: PRIMARY CARE CLINIC | Facility: CLINIC | Age: 81
End: 2021-01-20

## 2021-01-26 ENCOUNTER — CLINICAL SUPPORT (OUTPATIENT)
Dept: URGENT CARE | Facility: CLINIC | Age: 81
End: 2021-01-26
Payer: MEDICARE

## 2021-01-26 DIAGNOSIS — Z13.9 ENCOUNTER FOR SCREENING: Primary | ICD-10-CM

## 2021-01-26 LAB
CTP QC/QA: YES
SARS-COV-2 RDRP RESP QL NAA+PROBE: NEGATIVE

## 2021-01-26 PROCEDURE — U0002: ICD-10-PCS | Mod: QW,S$GLB,, | Performed by: FAMILY MEDICINE

## 2021-01-26 PROCEDURE — U0002 COVID-19 LAB TEST NON-CDC: HCPCS | Mod: QW,S$GLB,, | Performed by: FAMILY MEDICINE

## 2021-01-28 ENCOUNTER — PATIENT OUTREACH (OUTPATIENT)
Dept: ADMINISTRATIVE | Facility: HOSPITAL | Age: 81
End: 2021-01-28

## 2021-01-29 ENCOUNTER — TELEPHONE (OUTPATIENT)
Dept: PRIMARY CARE CLINIC | Facility: CLINIC | Age: 81
End: 2021-01-29

## 2021-01-29 DIAGNOSIS — E83.52 HYPERCALCEMIA: ICD-10-CM

## 2021-01-29 DIAGNOSIS — E21.3 HYPERPARATHYROIDISM: Primary | ICD-10-CM

## 2021-02-01 ENCOUNTER — TELEPHONE (OUTPATIENT)
Dept: PRIMARY CARE CLINIC | Facility: CLINIC | Age: 81
End: 2021-02-01

## 2021-02-02 ENCOUNTER — TELEPHONE (OUTPATIENT)
Dept: PRIMARY CARE CLINIC | Facility: CLINIC | Age: 81
End: 2021-02-02

## 2021-02-08 ENCOUNTER — TELEPHONE (OUTPATIENT)
Dept: PRIMARY CARE CLINIC | Facility: CLINIC | Age: 81
End: 2021-02-08

## 2021-02-11 ENCOUNTER — OFFICE VISIT (OUTPATIENT)
Dept: PRIMARY CARE CLINIC | Facility: CLINIC | Age: 81
End: 2021-02-11
Payer: MEDICARE

## 2021-02-11 VITALS
DIASTOLIC BLOOD PRESSURE: 76 MMHG | HEART RATE: 86 BPM | SYSTOLIC BLOOD PRESSURE: 122 MMHG | WEIGHT: 169.75 LBS | HEIGHT: 63 IN | OXYGEN SATURATION: 97 % | BODY MASS INDEX: 30.08 KG/M2

## 2021-02-11 DIAGNOSIS — Z12.83 SCREENING EXAM FOR SKIN CANCER: ICD-10-CM

## 2021-02-11 DIAGNOSIS — K80.50 CHOLEDOCHOLITHIASIS: ICD-10-CM

## 2021-02-11 DIAGNOSIS — E21.3 HYPERPARATHYROIDISM: ICD-10-CM

## 2021-02-11 DIAGNOSIS — R41.3 MEMORY IMPAIRMENT: ICD-10-CM

## 2021-02-11 DIAGNOSIS — I10 ESSENTIAL HYPERTENSION: ICD-10-CM

## 2021-02-11 DIAGNOSIS — M85.80 OSTEOPENIA, UNSPECIFIED LOCATION: ICD-10-CM

## 2021-02-11 DIAGNOSIS — G62.9 NEUROPATHY: ICD-10-CM

## 2021-02-11 DIAGNOSIS — D53.9 MACROCYTIC ANEMIA: ICD-10-CM

## 2021-02-11 DIAGNOSIS — E78.00 PURE HYPERCHOLESTEROLEMIA: Primary | ICD-10-CM

## 2021-02-11 PROCEDURE — 1101F PT FALLS ASSESS-DOCD LE1/YR: CPT | Mod: CPTII,S$GLB,, | Performed by: INTERNAL MEDICINE

## 2021-02-11 PROCEDURE — 99999 PR PBB SHADOW E&M-EST. PATIENT-LVL V: CPT | Mod: PBBFAC,,, | Performed by: INTERNAL MEDICINE

## 2021-02-11 PROCEDURE — 1126F AMNT PAIN NOTED NONE PRSNT: CPT | Mod: S$GLB,,, | Performed by: INTERNAL MEDICINE

## 2021-02-11 PROCEDURE — 1159F MED LIST DOCD IN RCRD: CPT | Mod: S$GLB,,, | Performed by: INTERNAL MEDICINE

## 2021-02-11 PROCEDURE — 3288F PR FALLS RISK ASSESSMENT DOCUMENTED: ICD-10-PCS | Mod: CPTII,S$GLB,, | Performed by: INTERNAL MEDICINE

## 2021-02-11 PROCEDURE — 1101F PR PT FALLS ASSESS DOC 0-1 FALLS W/OUT INJ PAST YR: ICD-10-PCS | Mod: CPTII,S$GLB,, | Performed by: INTERNAL MEDICINE

## 2021-02-11 PROCEDURE — 99499 UNLISTED E&M SERVICE: CPT | Mod: S$GLB,,, | Performed by: INTERNAL MEDICINE

## 2021-02-11 PROCEDURE — 1126F PR PAIN SEVERITY QUANTIFIED, NO PAIN PRESENT: ICD-10-PCS | Mod: S$GLB,,, | Performed by: INTERNAL MEDICINE

## 2021-02-11 PROCEDURE — 1159F PR MEDICATION LIST DOCUMENTED IN MEDICAL RECORD: ICD-10-PCS | Mod: S$GLB,,, | Performed by: INTERNAL MEDICINE

## 2021-02-11 PROCEDURE — 3074F SYST BP LT 130 MM HG: CPT | Mod: CPTII,S$GLB,, | Performed by: INTERNAL MEDICINE

## 2021-02-11 PROCEDURE — 99499 RISK ADDL DX/OHS AUDIT: ICD-10-PCS | Mod: S$GLB,,, | Performed by: INTERNAL MEDICINE

## 2021-02-11 PROCEDURE — 3074F PR MOST RECENT SYSTOLIC BLOOD PRESSURE < 130 MM HG: ICD-10-PCS | Mod: CPTII,S$GLB,, | Performed by: INTERNAL MEDICINE

## 2021-02-11 PROCEDURE — 3288F FALL RISK ASSESSMENT DOCD: CPT | Mod: CPTII,S$GLB,, | Performed by: INTERNAL MEDICINE

## 2021-02-11 PROCEDURE — 3078F PR MOST RECENT DIASTOLIC BLOOD PRESSURE < 80 MM HG: ICD-10-PCS | Mod: CPTII,S$GLB,, | Performed by: INTERNAL MEDICINE

## 2021-02-11 PROCEDURE — 3078F DIAST BP <80 MM HG: CPT | Mod: CPTII,S$GLB,, | Performed by: INTERNAL MEDICINE

## 2021-02-11 PROCEDURE — 99214 PR OFFICE/OUTPT VISIT, EST, LEVL IV, 30-39 MIN: ICD-10-PCS | Mod: S$GLB,,, | Performed by: INTERNAL MEDICINE

## 2021-02-11 PROCEDURE — 99214 OFFICE O/P EST MOD 30 MIN: CPT | Mod: S$GLB,,, | Performed by: INTERNAL MEDICINE

## 2021-02-11 PROCEDURE — 99999 PR PBB SHADOW E&M-EST. PATIENT-LVL V: ICD-10-PCS | Mod: PBBFAC,,, | Performed by: INTERNAL MEDICINE

## 2021-02-11 RX ORDER — GABAPENTIN 300 MG/1
CAPSULE ORAL
Qty: 90 CAPSULE | Refills: 0 | Status: SHIPPED | OUTPATIENT
Start: 2021-02-11 | End: 2021-04-11

## 2021-02-12 ENCOUNTER — PATIENT OUTREACH (OUTPATIENT)
Dept: ADMINISTRATIVE | Facility: HOSPITAL | Age: 81
End: 2021-02-12

## 2021-03-15 ENCOUNTER — OFFICE VISIT (OUTPATIENT)
Dept: DERMATOLOGY | Facility: CLINIC | Age: 81
End: 2021-03-15
Payer: MEDICARE

## 2021-03-15 DIAGNOSIS — Z12.83 SCREENING EXAM FOR SKIN CANCER: ICD-10-CM

## 2021-03-15 DIAGNOSIS — L29.9 PRURITUS: ICD-10-CM

## 2021-03-15 DIAGNOSIS — L82.1 SEBORRHEIC KERATOSES: Primary | ICD-10-CM

## 2021-03-15 DIAGNOSIS — K13.0 CHEILITIS: ICD-10-CM

## 2021-03-15 PROCEDURE — 99999 PR PBB SHADOW E&M-EST. PATIENT-LVL III: CPT | Mod: PBBFAC,,, | Performed by: DERMATOLOGY

## 2021-03-15 PROCEDURE — 99204 PR OFFICE/OUTPT VISIT, NEW, LEVL IV, 45-59 MIN: ICD-10-PCS | Mod: S$GLB,,, | Performed by: DERMATOLOGY

## 2021-03-15 PROCEDURE — 99999 PR PBB SHADOW E&M-EST. PATIENT-LVL III: ICD-10-PCS | Mod: PBBFAC,,, | Performed by: DERMATOLOGY

## 2021-03-15 PROCEDURE — 1101F PT FALLS ASSESS-DOCD LE1/YR: CPT | Mod: CPTII,S$GLB,, | Performed by: DERMATOLOGY

## 2021-03-15 PROCEDURE — 99204 OFFICE O/P NEW MOD 45 MIN: CPT | Mod: S$GLB,,, | Performed by: DERMATOLOGY

## 2021-03-15 PROCEDURE — 1126F PR PAIN SEVERITY QUANTIFIED, NO PAIN PRESENT: ICD-10-PCS | Mod: S$GLB,,, | Performed by: DERMATOLOGY

## 2021-03-15 PROCEDURE — 3288F PR FALLS RISK ASSESSMENT DOCUMENTED: ICD-10-PCS | Mod: CPTII,S$GLB,, | Performed by: DERMATOLOGY

## 2021-03-15 PROCEDURE — 1101F PR PT FALLS ASSESS DOC 0-1 FALLS W/OUT INJ PAST YR: ICD-10-PCS | Mod: CPTII,S$GLB,, | Performed by: DERMATOLOGY

## 2021-03-15 PROCEDURE — 3288F FALL RISK ASSESSMENT DOCD: CPT | Mod: CPTII,S$GLB,, | Performed by: DERMATOLOGY

## 2021-03-15 PROCEDURE — 1126F AMNT PAIN NOTED NONE PRSNT: CPT | Mod: S$GLB,,, | Performed by: DERMATOLOGY

## 2021-03-15 RX ORDER — FLUTICASONE PROPIONATE 0.5 MG/G
CREAM TOPICAL
Qty: 60 G | Refills: 3 | Status: SHIPPED | OUTPATIENT
Start: 2021-03-15

## 2021-05-05 ENCOUNTER — TELEPHONE (OUTPATIENT)
Dept: CARDIOLOGY | Facility: CLINIC | Age: 81
End: 2021-05-05

## 2021-05-05 DIAGNOSIS — Q21.12 PFO (PATENT FORAMEN OVALE): Primary | ICD-10-CM

## 2021-05-06 ENCOUNTER — TELEPHONE (OUTPATIENT)
Dept: CARDIOLOGY | Facility: CLINIC | Age: 81
End: 2021-05-06

## 2021-05-07 ENCOUNTER — PATIENT OUTREACH (OUTPATIENT)
Dept: ADMINISTRATIVE | Facility: CLINIC | Age: 81
End: 2021-05-07

## 2021-05-07 ENCOUNTER — EXTERNAL HOSPITAL ADMISSION (OUTPATIENT)
Dept: ADMINISTRATIVE | Facility: CLINIC | Age: 81
End: 2021-05-07

## 2021-05-12 ENCOUNTER — OFFICE VISIT (OUTPATIENT)
Dept: PRIMARY CARE CLINIC | Facility: CLINIC | Age: 81
End: 2021-05-12
Payer: MEDICARE

## 2021-05-12 VITALS
DIASTOLIC BLOOD PRESSURE: 74 MMHG | HEART RATE: 77 BPM | WEIGHT: 168.44 LBS | BODY MASS INDEX: 29.84 KG/M2 | OXYGEN SATURATION: 96 % | HEIGHT: 63 IN | RESPIRATION RATE: 20 BRPM | SYSTOLIC BLOOD PRESSURE: 120 MMHG

## 2021-05-12 DIAGNOSIS — I67.9 CEREBROVASCULAR DISEASE: ICD-10-CM

## 2021-05-12 DIAGNOSIS — R41.3 MEMORY IMPAIRMENT: ICD-10-CM

## 2021-05-12 DIAGNOSIS — E83.52 HYPERCALCEMIA: ICD-10-CM

## 2021-05-12 DIAGNOSIS — I10 ESSENTIAL HYPERTENSION: ICD-10-CM

## 2021-05-12 DIAGNOSIS — E78.00 PURE HYPERCHOLESTEROLEMIA: Primary | ICD-10-CM

## 2021-05-12 PROCEDURE — 99214 OFFICE O/P EST MOD 30 MIN: CPT | Mod: S$GLB,,, | Performed by: INTERNAL MEDICINE

## 2021-05-12 PROCEDURE — 1159F MED LIST DOCD IN RCRD: CPT | Mod: S$GLB,,, | Performed by: INTERNAL MEDICINE

## 2021-05-12 PROCEDURE — 1126F PR PAIN SEVERITY QUANTIFIED, NO PAIN PRESENT: ICD-10-PCS | Mod: S$GLB,,, | Performed by: INTERNAL MEDICINE

## 2021-05-12 PROCEDURE — 1101F PR PT FALLS ASSESS DOC 0-1 FALLS W/OUT INJ PAST YR: ICD-10-PCS | Mod: CPTII,S$GLB,, | Performed by: INTERNAL MEDICINE

## 2021-05-12 PROCEDURE — 99999 PR PBB SHADOW E&M-EST. PATIENT-LVL IV: CPT | Mod: PBBFAC,,, | Performed by: INTERNAL MEDICINE

## 2021-05-12 PROCEDURE — 3288F FALL RISK ASSESSMENT DOCD: CPT | Mod: CPTII,S$GLB,, | Performed by: INTERNAL MEDICINE

## 2021-05-12 PROCEDURE — 1101F PT FALLS ASSESS-DOCD LE1/YR: CPT | Mod: CPTII,S$GLB,, | Performed by: INTERNAL MEDICINE

## 2021-05-12 PROCEDURE — 3288F PR FALLS RISK ASSESSMENT DOCUMENTED: ICD-10-PCS | Mod: CPTII,S$GLB,, | Performed by: INTERNAL MEDICINE

## 2021-05-12 PROCEDURE — 1126F AMNT PAIN NOTED NONE PRSNT: CPT | Mod: S$GLB,,, | Performed by: INTERNAL MEDICINE

## 2021-05-12 PROCEDURE — 99214 PR OFFICE/OUTPT VISIT, EST, LEVL IV, 30-39 MIN: ICD-10-PCS | Mod: S$GLB,,, | Performed by: INTERNAL MEDICINE

## 2021-05-12 PROCEDURE — 99499 RISK ADDL DX/OHS AUDIT: ICD-10-PCS | Mod: S$GLB,,, | Performed by: INTERNAL MEDICINE

## 2021-05-12 PROCEDURE — 99999 PR PBB SHADOW E&M-EST. PATIENT-LVL IV: ICD-10-PCS | Mod: PBBFAC,,, | Performed by: INTERNAL MEDICINE

## 2021-05-12 PROCEDURE — 99499 UNLISTED E&M SERVICE: CPT | Mod: S$GLB,,, | Performed by: INTERNAL MEDICINE

## 2021-05-12 PROCEDURE — 1159F PR MEDICATION LIST DOCUMENTED IN MEDICAL RECORD: ICD-10-PCS | Mod: S$GLB,,, | Performed by: INTERNAL MEDICINE

## 2021-05-12 RX ORDER — FAMOTIDINE 40 MG/1
40 TABLET, FILM COATED ORAL DAILY
COMMUNITY
Start: 2021-05-03 | End: 2023-08-15

## 2021-05-12 RX ORDER — CLOPIDOGREL BISULFATE 75 MG/1
75 TABLET ORAL DAILY
Qty: 30 TABLET | Refills: 0
Start: 2021-05-12 | End: 2021-07-29 | Stop reason: SDUPTHER

## 2021-05-12 RX ORDER — ATORVASTATIN CALCIUM 80 MG/1
80 TABLET, FILM COATED ORAL DAILY
Qty: 90 TABLET | Refills: 0
Start: 2021-05-12 | End: 2021-07-29 | Stop reason: SDUPTHER

## 2021-05-18 ENCOUNTER — OFFICE VISIT (OUTPATIENT)
Dept: CARDIOLOGY | Facility: CLINIC | Age: 81
End: 2021-05-18
Payer: MEDICARE

## 2021-05-18 VITALS
DIASTOLIC BLOOD PRESSURE: 62 MMHG | SYSTOLIC BLOOD PRESSURE: 125 MMHG | HEIGHT: 63 IN | BODY MASS INDEX: 29.61 KG/M2 | OXYGEN SATURATION: 99 % | HEART RATE: 68 BPM | WEIGHT: 167.13 LBS

## 2021-05-18 DIAGNOSIS — G45.9 TRANSIENT CEREBRAL ISCHEMIA, UNSPECIFIED TYPE: ICD-10-CM

## 2021-05-18 DIAGNOSIS — Q21.12 PFO (PATENT FORAMEN OVALE): Primary | ICD-10-CM

## 2021-05-18 DIAGNOSIS — Q21.12 PFO (PATENT FORAMEN OVALE): ICD-10-CM

## 2021-05-18 DIAGNOSIS — I63.9 CEREBROVASCULAR ACCIDENT (CVA), UNSPECIFIED MECHANISM: ICD-10-CM

## 2021-05-18 PROCEDURE — 99999 PR PBB SHADOW E&M-EST. PATIENT-LVL IV: ICD-10-PCS | Mod: PBBFAC,,, | Performed by: INTERNAL MEDICINE

## 2021-05-18 PROCEDURE — 99999 PR PBB SHADOW E&M-EST. PATIENT-LVL IV: CPT | Mod: PBBFAC,,, | Performed by: INTERNAL MEDICINE

## 2021-05-18 PROCEDURE — 1125F AMNT PAIN NOTED PAIN PRSNT: CPT | Mod: S$GLB,,, | Performed by: INTERNAL MEDICINE

## 2021-05-18 PROCEDURE — 99205 OFFICE O/P NEW HI 60 MIN: CPT | Mod: S$GLB,,, | Performed by: INTERNAL MEDICINE

## 2021-05-18 PROCEDURE — 99205 PR OFFICE/OUTPT VISIT, NEW, LEVL V, 60-74 MIN: ICD-10-PCS | Mod: S$GLB,,, | Performed by: INTERNAL MEDICINE

## 2021-05-18 PROCEDURE — 1159F MED LIST DOCD IN RCRD: CPT | Mod: S$GLB,,, | Performed by: INTERNAL MEDICINE

## 2021-05-18 PROCEDURE — 1159F PR MEDICATION LIST DOCUMENTED IN MEDICAL RECORD: ICD-10-PCS | Mod: S$GLB,,, | Performed by: INTERNAL MEDICINE

## 2021-05-18 PROCEDURE — 1125F PR PAIN SEVERITY QUANTIFIED, PAIN PRESENT: ICD-10-PCS | Mod: S$GLB,,, | Performed by: INTERNAL MEDICINE

## 2021-05-28 ENCOUNTER — CLINICAL SUPPORT (OUTPATIENT)
Dept: CARDIOLOGY | Facility: HOSPITAL | Age: 81
End: 2021-05-28
Attending: INTERNAL MEDICINE
Payer: MEDICARE

## 2021-05-28 ENCOUNTER — HOSPITAL ENCOUNTER (OUTPATIENT)
Dept: CARDIOLOGY | Facility: HOSPITAL | Age: 81
Discharge: HOME | End: 2021-05-28
Attending: INTERNAL MEDICINE
Payer: MEDICARE

## 2021-05-28 VITALS
HEART RATE: 68 BPM | BODY MASS INDEX: 29.59 KG/M2 | HEIGHT: 63 IN | DIASTOLIC BLOOD PRESSURE: 80 MMHG | WEIGHT: 167 LBS | SYSTOLIC BLOOD PRESSURE: 140 MMHG

## 2021-05-28 DIAGNOSIS — Q21.12 PFO (PATENT FORAMEN OVALE): ICD-10-CM

## 2021-05-28 DIAGNOSIS — G45.9 TRANSIENT CEREBRAL ISCHEMIA, UNSPECIFIED TYPE: ICD-10-CM

## 2021-05-28 LAB
ASCENDING AORTA: 3.56 CM
AV INDEX (PROSTH): 0.56
AV MEAN GRADIENT: 4 MMHG
AV PEAK GRADIENT: 9 MMHG
AV VALVE AREA: 1.9 CM2
AV VELOCITY RATIO: 0.58
BSA FOR ECHO PROCEDURE: 1.83 M2
CV ECHO LV RWT: 0.35 CM
DOP CALC AO PEAK VEL: 1.5 M/S
DOP CALC AO VTI: 30.29 CM
DOP CALC LVOT AREA: 3.4 CM2
DOP CALC LVOT DIAMETER: 2.08 CM
DOP CALC LVOT PEAK VEL: 0.87 M/S
DOP CALC LVOT STROKE VOLUME: 57.6 CM3
DOP CALCLVOT PEAK VEL VTI: 16.96 CM
E WAVE DECELERATION TIME: 322.75 MSEC
E/A RATIO: 0.54
E/E' RATIO: 7.64 M/S
ECHO LV POSTERIOR WALL: 0.78 CM (ref 0.6–1.1)
EJECTION FRACTION: 63 %
FRACTIONAL SHORTENING: 35 % (ref 28–44)
INTERVENTRICULAR SEPTUM: 0.85 CM (ref 0.6–1.1)
IVRT: 114.18 MSEC
LA MAJOR: 5.72 CM
LA MINOR: 5.73 CM
LA WIDTH: 3.96 CM
LEFT ATRIUM SIZE: 3.14 CM
LEFT ATRIUM VOLUME INDEX MOD: 30.1 ML/M2
LEFT ATRIUM VOLUME INDEX: 33.8 ML/M2
LEFT ATRIUM VOLUME MOD: 53.84 CM3
LEFT ATRIUM VOLUME: 60.51 CM3
LEFT INTERNAL DIMENSION IN SYSTOLE: 2.88 CM (ref 2.1–4)
LEFT VENTRICLE DIASTOLIC VOLUME INDEX: 49.77 ML/M2
LEFT VENTRICLE DIASTOLIC VOLUME: 89.08 ML
LEFT VENTRICLE MASS INDEX: 63 G/M2
LEFT VENTRICLE SYSTOLIC VOLUME INDEX: 17.7 ML/M2
LEFT VENTRICLE SYSTOLIC VOLUME: 31.66 ML
LEFT VENTRICULAR INTERNAL DIMENSION IN DIASTOLE: 4.43 CM (ref 3.5–6)
LEFT VENTRICULAR MASS: 113.42 G
LV LATERAL E/E' RATIO: 7 M/S
LV SEPTAL E/E' RATIO: 8.4 M/S
MV A" WAVE DURATION": 10.28 MSEC
MV PEAK A VEL: 0.78 M/S
MV PEAK E VEL: 0.42 M/S
MV STENOSIS PRESSURE HALF TIME: 93.6 MS
MV VALVE AREA P 1/2 METHOD: 2.35 CM2
PISA TR MAX VEL: 2.5 M/S
PULM VEIN S/D RATIO: 1.08
PV PEAK D VEL: 0.36 M/S
PV PEAK S VEL: 0.39 M/S
RA MAJOR: 5.82 CM
RA PRESSURE: 3 MMHG
RA WIDTH: 4.22 CM
RIGHT VENTRICULAR END-DIASTOLIC DIMENSION: 2.99 CM
RV TISSUE DOPPLER FREE WALL SYSTOLIC VELOCITY 1 (APICAL 4 CHAMBER VIEW): 14.79 CM/S
SINUS: 2.97 CM
STJ: 2.76 CM
TDI LATERAL: 0.06 M/S
TDI SEPTAL: 0.05 M/S
TDI: 0.06 M/S
TR MAX PG: 25 MMHG
TRICUSPID ANNULAR PLANE SYSTOLIC EXCURSION: 1.66 CM
TV REST PULMONARY ARTERY PRESSURE: 28 MMHG

## 2021-05-28 PROCEDURE — 93306 ECHO (CUPID ONLY): ICD-10-PCS | Mod: 26,,, | Performed by: INTERNAL MEDICINE

## 2021-05-28 PROCEDURE — 93306 TTE W/DOPPLER COMPLETE: CPT | Mod: 26,,, | Performed by: INTERNAL MEDICINE

## 2021-05-28 PROCEDURE — 93306 TTE W/DOPPLER COMPLETE: CPT

## 2021-05-28 PROCEDURE — 93272 CARDIAC EVENT MONITOR (CUPID ONLY): ICD-10-PCS | Mod: ,,, | Performed by: INTERNAL MEDICINE

## 2021-05-28 PROCEDURE — 93271 ECG/MONITORING AND ANALYSIS: CPT

## 2021-05-28 PROCEDURE — 93272 ECG/REVIEW INTERPRET ONLY: CPT | Mod: ,,, | Performed by: INTERNAL MEDICINE

## 2021-05-31 ENCOUNTER — OFFICE VISIT (OUTPATIENT)
Dept: DERMATOLOGY | Facility: CLINIC | Age: 81
End: 2021-05-31
Payer: MEDICARE

## 2021-05-31 DIAGNOSIS — L60.3 ONYCHORRHEXIS: ICD-10-CM

## 2021-05-31 DIAGNOSIS — L82.1 SEBORRHEIC KERATOSES: ICD-10-CM

## 2021-05-31 DIAGNOSIS — L72.0 MILIA: ICD-10-CM

## 2021-05-31 DIAGNOSIS — L81.4 LENTIGO: Primary | ICD-10-CM

## 2021-05-31 DIAGNOSIS — D22.9 MULTIPLE BENIGN NEVI: ICD-10-CM

## 2021-05-31 DIAGNOSIS — L21.9 SEBORRHEIC DERMATITIS: ICD-10-CM

## 2021-05-31 PROCEDURE — 1126F AMNT PAIN NOTED NONE PRSNT: CPT | Mod: S$GLB,,, | Performed by: DERMATOLOGY

## 2021-05-31 PROCEDURE — 1101F PR PT FALLS ASSESS DOC 0-1 FALLS W/OUT INJ PAST YR: ICD-10-PCS | Mod: CPTII,S$GLB,, | Performed by: DERMATOLOGY

## 2021-05-31 PROCEDURE — 99999 PR PBB SHADOW E&M-EST. PATIENT-LVL III: ICD-10-PCS | Mod: PBBFAC,,, | Performed by: DERMATOLOGY

## 2021-05-31 PROCEDURE — 3288F FALL RISK ASSESSMENT DOCD: CPT | Mod: CPTII,S$GLB,, | Performed by: DERMATOLOGY

## 2021-05-31 PROCEDURE — 99214 OFFICE O/P EST MOD 30 MIN: CPT | Mod: S$GLB,,, | Performed by: DERMATOLOGY

## 2021-05-31 PROCEDURE — 99999 PR PBB SHADOW E&M-EST. PATIENT-LVL III: CPT | Mod: PBBFAC,,, | Performed by: DERMATOLOGY

## 2021-05-31 PROCEDURE — 99214 PR OFFICE/OUTPT VISIT, EST, LEVL IV, 30-39 MIN: ICD-10-PCS | Mod: S$GLB,,, | Performed by: DERMATOLOGY

## 2021-05-31 PROCEDURE — 1126F PR PAIN SEVERITY QUANTIFIED, NO PAIN PRESENT: ICD-10-PCS | Mod: S$GLB,,, | Performed by: DERMATOLOGY

## 2021-05-31 PROCEDURE — 1101F PT FALLS ASSESS-DOCD LE1/YR: CPT | Mod: CPTII,S$GLB,, | Performed by: DERMATOLOGY

## 2021-05-31 PROCEDURE — 3288F PR FALLS RISK ASSESSMENT DOCUMENTED: ICD-10-PCS | Mod: CPTII,S$GLB,, | Performed by: DERMATOLOGY

## 2021-05-31 RX ORDER — MOMETASONE FUROATE 1 MG/ML
SOLUTION TOPICAL DAILY
Qty: 60 ML | Refills: 5 | Status: SHIPPED | OUTPATIENT
Start: 2021-05-31

## 2021-05-31 RX ORDER — KETOCONAZOLE 20 MG/ML
SHAMPOO, SUSPENSION TOPICAL
Qty: 120 ML | Refills: 5 | Status: SHIPPED | OUTPATIENT
Start: 2021-05-31 | End: 2022-06-27 | Stop reason: SDUPTHER

## 2021-06-03 ENCOUNTER — LAB VISIT (OUTPATIENT)
Dept: LAB | Facility: HOSPITAL | Age: 81
End: 2021-06-03
Attending: INTERNAL MEDICINE
Payer: MEDICARE

## 2021-06-03 DIAGNOSIS — D53.9 MACROCYTIC ANEMIA: ICD-10-CM

## 2021-06-03 DIAGNOSIS — E78.00 PURE HYPERCHOLESTEROLEMIA: ICD-10-CM

## 2021-06-03 DIAGNOSIS — I10 ESSENTIAL HYPERTENSION: ICD-10-CM

## 2021-06-03 LAB
ANION GAP SERPL CALC-SCNC: 8 MMOL/L (ref 8–16)
BASOPHILS # BLD AUTO: 0.05 K/UL (ref 0–0.2)
BASOPHILS NFR BLD: 0.8 % (ref 0–1.9)
BUN SERPL-MCNC: 13 MG/DL (ref 8–23)
CALCIUM SERPL-MCNC: 9.6 MG/DL (ref 8.7–10.5)
CHLORIDE SERPL-SCNC: 106 MMOL/L (ref 95–110)
CHOLEST SERPL-MCNC: 116 MG/DL (ref 120–199)
CHOLEST/HDLC SERPL: 2.6 {RATIO} (ref 2–5)
CO2 SERPL-SCNC: 28 MMOL/L (ref 23–29)
CREAT SERPL-MCNC: 1 MG/DL (ref 0.5–1.4)
DIFFERENTIAL METHOD: ABNORMAL
EOSINOPHIL # BLD AUTO: 0.2 K/UL (ref 0–0.5)
EOSINOPHIL NFR BLD: 3.3 % (ref 0–8)
ERYTHROCYTE [DISTWIDTH] IN BLOOD BY AUTOMATED COUNT: 12.7 % (ref 11.5–14.5)
EST. GFR  (AFRICAN AMERICAN): >60 ML/MIN/1.73 M^2
EST. GFR  (NON AFRICAN AMERICAN): 52.6 ML/MIN/1.73 M^2
GLUCOSE SERPL-MCNC: 99 MG/DL (ref 70–110)
HCT VFR BLD AUTO: 36.1 % (ref 37–48.5)
HDLC SERPL-MCNC: 44 MG/DL (ref 40–75)
HDLC SERPL: 37.9 % (ref 20–50)
HGB BLD-MCNC: 11 G/DL (ref 12–16)
IMM GRANULOCYTES # BLD AUTO: 0.02 K/UL (ref 0–0.04)
IMM GRANULOCYTES NFR BLD AUTO: 0.3 % (ref 0–0.5)
LDLC SERPL CALC-MCNC: 52 MG/DL (ref 63–159)
LYMPHOCYTES # BLD AUTO: 2 K/UL (ref 1–4.8)
LYMPHOCYTES NFR BLD: 31.3 % (ref 18–48)
MCH RBC QN AUTO: 31.3 PG (ref 27–31)
MCHC RBC AUTO-ENTMCNC: 30.5 G/DL (ref 32–36)
MCV RBC AUTO: 103 FL (ref 82–98)
MONOCYTES # BLD AUTO: 0.4 K/UL (ref 0.3–1)
MONOCYTES NFR BLD: 5.9 % (ref 4–15)
NEUTROPHILS # BLD AUTO: 3.7 K/UL (ref 1.8–7.7)
NEUTROPHILS NFR BLD: 58.4 % (ref 38–73)
NONHDLC SERPL-MCNC: 72 MG/DL
NRBC BLD-RTO: 0 /100 WBC
PLATELET # BLD AUTO: 193 K/UL (ref 150–450)
PMV BLD AUTO: 12.5 FL (ref 9.2–12.9)
POTASSIUM SERPL-SCNC: 3.8 MMOL/L (ref 3.5–5.1)
RBC # BLD AUTO: 3.51 M/UL (ref 4–5.4)
SODIUM SERPL-SCNC: 142 MMOL/L (ref 136–145)
TRIGL SERPL-MCNC: 100 MG/DL (ref 30–150)
WBC # BLD AUTO: 6.4 K/UL (ref 3.9–12.7)

## 2021-06-03 PROCEDURE — 80061 LIPID PANEL: CPT | Performed by: INTERNAL MEDICINE

## 2021-06-03 PROCEDURE — 80048 BASIC METABOLIC PNL TOTAL CA: CPT | Performed by: INTERNAL MEDICINE

## 2021-06-03 PROCEDURE — 36415 COLL VENOUS BLD VENIPUNCTURE: CPT | Mod: PN | Performed by: INTERNAL MEDICINE

## 2021-06-03 PROCEDURE — 85025 COMPLETE CBC W/AUTO DIFF WBC: CPT | Performed by: INTERNAL MEDICINE

## 2021-06-07 ENCOUNTER — TELEPHONE (OUTPATIENT)
Dept: PRIMARY CARE CLINIC | Facility: CLINIC | Age: 81
End: 2021-06-07

## 2021-06-22 ENCOUNTER — OFFICE VISIT (OUTPATIENT)
Dept: CARDIOLOGY | Facility: CLINIC | Age: 81
End: 2021-06-22
Payer: MEDICARE

## 2021-06-22 VITALS
DIASTOLIC BLOOD PRESSURE: 71 MMHG | HEIGHT: 63 IN | HEART RATE: 82 BPM | BODY MASS INDEX: 29.34 KG/M2 | OXYGEN SATURATION: 95 % | SYSTOLIC BLOOD PRESSURE: 122 MMHG | WEIGHT: 165.56 LBS

## 2021-06-22 DIAGNOSIS — I07.1 TRICUSPID VALVE INSUFFICIENCY, UNSPECIFIED ETIOLOGY: ICD-10-CM

## 2021-06-22 DIAGNOSIS — I63.40 CEREBROVASCULAR ACCIDENT (CVA) DUE TO EMBOLISM OF CEREBRAL ARTERY: ICD-10-CM

## 2021-06-22 DIAGNOSIS — Z86.718 H/O DEEP VENOUS THROMBOSIS: ICD-10-CM

## 2021-06-22 DIAGNOSIS — E78.2 MIXED HYPERLIPIDEMIA: ICD-10-CM

## 2021-06-22 DIAGNOSIS — I10 ESSENTIAL HYPERTENSION: ICD-10-CM

## 2021-06-22 DIAGNOSIS — E78.00 PURE HYPERCHOLESTEROLEMIA: ICD-10-CM

## 2021-06-22 DIAGNOSIS — I49.1 PREMATURE ATRIAL CONTRACTIONS: ICD-10-CM

## 2021-06-22 DIAGNOSIS — Q21.12 PFO (PATENT FORAMEN OVALE): Primary | ICD-10-CM

## 2021-06-22 PROCEDURE — 99214 OFFICE O/P EST MOD 30 MIN: CPT | Mod: 25,S$GLB,, | Performed by: INTERNAL MEDICINE

## 2021-06-22 PROCEDURE — 1126F AMNT PAIN NOTED NONE PRSNT: CPT | Mod: S$GLB,,, | Performed by: INTERNAL MEDICINE

## 2021-06-22 PROCEDURE — 3288F FALL RISK ASSESSMENT DOCD: CPT | Mod: CPTII,S$GLB,, | Performed by: INTERNAL MEDICINE

## 2021-06-22 PROCEDURE — 1101F PR PT FALLS ASSESS DOC 0-1 FALLS W/OUT INJ PAST YR: ICD-10-PCS | Mod: CPTII,S$GLB,, | Performed by: INTERNAL MEDICINE

## 2021-06-22 PROCEDURE — 99499 UNLISTED E&M SERVICE: CPT | Mod: S$GLB,,, | Performed by: INTERNAL MEDICINE

## 2021-06-22 PROCEDURE — 1159F PR MEDICATION LIST DOCUMENTED IN MEDICAL RECORD: ICD-10-PCS | Mod: S$GLB,,, | Performed by: INTERNAL MEDICINE

## 2021-06-22 PROCEDURE — 93000 ELECTROCARDIOGRAM COMPLETE: CPT | Mod: S$GLB,,, | Performed by: INTERNAL MEDICINE

## 2021-06-22 PROCEDURE — 1126F PR PAIN SEVERITY QUANTIFIED, NO PAIN PRESENT: ICD-10-PCS | Mod: S$GLB,,, | Performed by: INTERNAL MEDICINE

## 2021-06-22 PROCEDURE — 1101F PT FALLS ASSESS-DOCD LE1/YR: CPT | Mod: CPTII,S$GLB,, | Performed by: INTERNAL MEDICINE

## 2021-06-22 PROCEDURE — 99214 PR OFFICE/OUTPT VISIT, EST, LEVL IV, 30-39 MIN: ICD-10-PCS | Mod: 25,S$GLB,, | Performed by: INTERNAL MEDICINE

## 2021-06-22 PROCEDURE — 99999 PR PBB SHADOW E&M-EST. PATIENT-LVL III: CPT | Mod: PBBFAC,,, | Performed by: INTERNAL MEDICINE

## 2021-06-22 PROCEDURE — 93000 EKG 12-LEAD: ICD-10-PCS | Mod: S$GLB,,, | Performed by: INTERNAL MEDICINE

## 2021-06-22 PROCEDURE — 3288F PR FALLS RISK ASSESSMENT DOCUMENTED: ICD-10-PCS | Mod: CPTII,S$GLB,, | Performed by: INTERNAL MEDICINE

## 2021-06-22 PROCEDURE — 99499 RISK ADDL DX/OHS AUDIT: ICD-10-PCS | Mod: S$GLB,,, | Performed by: INTERNAL MEDICINE

## 2021-06-22 PROCEDURE — 1159F MED LIST DOCD IN RCRD: CPT | Mod: S$GLB,,, | Performed by: INTERNAL MEDICINE

## 2021-06-22 PROCEDURE — 99999 PR PBB SHADOW E&M-EST. PATIENT-LVL III: ICD-10-PCS | Mod: PBBFAC,,, | Performed by: INTERNAL MEDICINE

## 2021-06-28 ENCOUNTER — TELEPHONE (OUTPATIENT)
Dept: PRIMARY CARE CLINIC | Facility: CLINIC | Age: 81
End: 2021-06-28

## 2021-07-06 ENCOUNTER — OFFICE VISIT (OUTPATIENT)
Dept: CARDIOLOGY | Facility: CLINIC | Age: 81
End: 2021-07-06
Payer: MEDICARE

## 2021-07-06 VITALS
HEART RATE: 77 BPM | SYSTOLIC BLOOD PRESSURE: 128 MMHG | HEIGHT: 63 IN | WEIGHT: 164.44 LBS | BODY MASS INDEX: 29.14 KG/M2 | DIASTOLIC BLOOD PRESSURE: 73 MMHG | OXYGEN SATURATION: 98 %

## 2021-07-06 DIAGNOSIS — I10 ESSENTIAL HYPERTENSION: ICD-10-CM

## 2021-07-06 DIAGNOSIS — Q21.12 PFO (PATENT FORAMEN OVALE): Primary | ICD-10-CM

## 2021-07-06 DIAGNOSIS — I63.9 CEREBROVASCULAR ACCIDENT (CVA), UNSPECIFIED MECHANISM: ICD-10-CM

## 2021-07-06 DIAGNOSIS — E78.00 PURE HYPERCHOLESTEROLEMIA: ICD-10-CM

## 2021-07-06 DIAGNOSIS — E78.2 MIXED HYPERLIPIDEMIA: ICD-10-CM

## 2021-07-06 PROCEDURE — 1159F MED LIST DOCD IN RCRD: CPT | Mod: S$GLB,,, | Performed by: INTERNAL MEDICINE

## 2021-07-06 PROCEDURE — 1159F PR MEDICATION LIST DOCUMENTED IN MEDICAL RECORD: ICD-10-PCS | Mod: S$GLB,,, | Performed by: INTERNAL MEDICINE

## 2021-07-06 PROCEDURE — 99215 PR OFFICE/OUTPT VISIT, EST, LEVL V, 40-54 MIN: ICD-10-PCS | Mod: S$GLB,,, | Performed by: INTERNAL MEDICINE

## 2021-07-06 PROCEDURE — 1126F AMNT PAIN NOTED NONE PRSNT: CPT | Mod: S$GLB,,, | Performed by: INTERNAL MEDICINE

## 2021-07-06 PROCEDURE — 99499 UNLISTED E&M SERVICE: CPT | Mod: S$GLB,,, | Performed by: INTERNAL MEDICINE

## 2021-07-06 PROCEDURE — 99215 OFFICE O/P EST HI 40 MIN: CPT | Mod: S$GLB,,, | Performed by: INTERNAL MEDICINE

## 2021-07-06 PROCEDURE — 1126F PR PAIN SEVERITY QUANTIFIED, NO PAIN PRESENT: ICD-10-PCS | Mod: S$GLB,,, | Performed by: INTERNAL MEDICINE

## 2021-07-06 PROCEDURE — 99999 PR PBB SHADOW E&M-EST. PATIENT-LVL IV: CPT | Mod: PBBFAC,,, | Performed by: INTERNAL MEDICINE

## 2021-07-06 PROCEDURE — 99999 PR PBB SHADOW E&M-EST. PATIENT-LVL IV: ICD-10-PCS | Mod: PBBFAC,,, | Performed by: INTERNAL MEDICINE

## 2021-07-06 PROCEDURE — 99499 RISK ADDL DX/OHS AUDIT: ICD-10-PCS | Mod: S$GLB,,, | Performed by: INTERNAL MEDICINE

## 2021-07-09 ENCOUNTER — OFFICE VISIT (OUTPATIENT)
Dept: PRIMARY CARE CLINIC | Facility: CLINIC | Age: 81
End: 2021-07-09
Payer: MEDICARE

## 2021-07-09 VITALS
SYSTOLIC BLOOD PRESSURE: 123 MMHG | HEART RATE: 72 BPM | TEMPERATURE: 98 F | BODY MASS INDEX: 29.02 KG/M2 | OXYGEN SATURATION: 97 % | RESPIRATION RATE: 17 BRPM | HEIGHT: 63 IN | DIASTOLIC BLOOD PRESSURE: 72 MMHG | WEIGHT: 163.81 LBS

## 2021-07-09 DIAGNOSIS — I10 ESSENTIAL HYPERTENSION: Primary | ICD-10-CM

## 2021-07-09 DIAGNOSIS — G60.9 IDIOPATHIC PERIPHERAL NEUROPATHY: ICD-10-CM

## 2021-07-09 DIAGNOSIS — E21.3 HYPERPARATHYROIDISM: ICD-10-CM

## 2021-07-09 DIAGNOSIS — R41.3 MEMORY IMPAIRMENT: ICD-10-CM

## 2021-07-09 DIAGNOSIS — Q21.12 PFO (PATENT FORAMEN OVALE): ICD-10-CM

## 2021-07-09 DIAGNOSIS — E78.00 PURE HYPERCHOLESTEROLEMIA: ICD-10-CM

## 2021-07-09 DIAGNOSIS — I67.9 CEREBROVASCULAR DISEASE: ICD-10-CM

## 2021-07-09 PROCEDURE — 3288F FALL RISK ASSESSMENT DOCD: CPT | Mod: CPTII,S$GLB,, | Performed by: INTERNAL MEDICINE

## 2021-07-09 PROCEDURE — 3288F PR FALLS RISK ASSESSMENT DOCUMENTED: ICD-10-PCS | Mod: CPTII,S$GLB,, | Performed by: INTERNAL MEDICINE

## 2021-07-09 PROCEDURE — 3078F PR MOST RECENT DIASTOLIC BLOOD PRESSURE < 80 MM HG: ICD-10-PCS | Mod: CPTII,S$GLB,, | Performed by: INTERNAL MEDICINE

## 2021-07-09 PROCEDURE — 99499 RISK ADDL DX/OHS AUDIT: ICD-10-PCS | Mod: S$GLB,,, | Performed by: INTERNAL MEDICINE

## 2021-07-09 PROCEDURE — 99214 OFFICE O/P EST MOD 30 MIN: CPT | Mod: S$GLB,,, | Performed by: INTERNAL MEDICINE

## 2021-07-09 PROCEDURE — 3074F PR MOST RECENT SYSTOLIC BLOOD PRESSURE < 130 MM HG: ICD-10-PCS | Mod: CPTII,S$GLB,, | Performed by: INTERNAL MEDICINE

## 2021-07-09 PROCEDURE — 99999 PR PBB SHADOW E&M-EST. PATIENT-LVL IV: ICD-10-PCS | Mod: PBBFAC,,, | Performed by: INTERNAL MEDICINE

## 2021-07-09 PROCEDURE — 1159F PR MEDICATION LIST DOCUMENTED IN MEDICAL RECORD: ICD-10-PCS | Mod: S$GLB,,, | Performed by: INTERNAL MEDICINE

## 2021-07-09 PROCEDURE — 99214 PR OFFICE/OUTPT VISIT, EST, LEVL IV, 30-39 MIN: ICD-10-PCS | Mod: S$GLB,,, | Performed by: INTERNAL MEDICINE

## 2021-07-09 PROCEDURE — 1101F PR PT FALLS ASSESS DOC 0-1 FALLS W/OUT INJ PAST YR: ICD-10-PCS | Mod: CPTII,S$GLB,, | Performed by: INTERNAL MEDICINE

## 2021-07-09 PROCEDURE — 1125F AMNT PAIN NOTED PAIN PRSNT: CPT | Mod: S$GLB,,, | Performed by: INTERNAL MEDICINE

## 2021-07-09 PROCEDURE — 99499 UNLISTED E&M SERVICE: CPT | Mod: S$GLB,,, | Performed by: INTERNAL MEDICINE

## 2021-07-09 PROCEDURE — 1125F PR PAIN SEVERITY QUANTIFIED, PAIN PRESENT: ICD-10-PCS | Mod: S$GLB,,, | Performed by: INTERNAL MEDICINE

## 2021-07-09 PROCEDURE — 99999 PR PBB SHADOW E&M-EST. PATIENT-LVL IV: CPT | Mod: PBBFAC,,, | Performed by: INTERNAL MEDICINE

## 2021-07-09 PROCEDURE — 3078F DIAST BP <80 MM HG: CPT | Mod: CPTII,S$GLB,, | Performed by: INTERNAL MEDICINE

## 2021-07-09 PROCEDURE — 1159F MED LIST DOCD IN RCRD: CPT | Mod: S$GLB,,, | Performed by: INTERNAL MEDICINE

## 2021-07-09 PROCEDURE — 3074F SYST BP LT 130 MM HG: CPT | Mod: CPTII,S$GLB,, | Performed by: INTERNAL MEDICINE

## 2021-07-09 PROCEDURE — 1101F PT FALLS ASSESS-DOCD LE1/YR: CPT | Mod: CPTII,S$GLB,, | Performed by: INTERNAL MEDICINE

## 2021-07-09 RX ORDER — CIPROFLOXACIN 500 MG/1
TABLET ORAL
COMMUNITY
Start: 2021-06-25 | End: 2021-07-09

## 2021-07-09 RX ORDER — HYDROCHLOROTHIAZIDE 12.5 MG/1
12.5 CAPSULE ORAL DAILY
Qty: 90 CAPSULE | Refills: 1 | Status: SHIPPED | OUTPATIENT
Start: 2021-07-09 | End: 2021-11-09

## 2021-07-12 ENCOUNTER — OFFICE VISIT (OUTPATIENT)
Dept: DERMATOLOGY | Facility: CLINIC | Age: 81
End: 2021-07-12
Payer: MEDICARE

## 2021-07-12 ENCOUNTER — LAB VISIT (OUTPATIENT)
Dept: LAB | Facility: HOSPITAL | Age: 81
End: 2021-07-12
Payer: MEDICARE

## 2021-07-12 DIAGNOSIS — M65.9 SAPHO SYNDROME: ICD-10-CM

## 2021-07-12 DIAGNOSIS — E83.52 HYPERCALCEMIA: ICD-10-CM

## 2021-07-12 DIAGNOSIS — L82.1 SEBORRHEIC KERATOSES: ICD-10-CM

## 2021-07-12 DIAGNOSIS — L82.0 SEBORRHEIC KERATOSES, INFLAMED: ICD-10-CM

## 2021-07-12 DIAGNOSIS — M85.80 SAPHO SYNDROME: ICD-10-CM

## 2021-07-12 DIAGNOSIS — L40.3 SAPHO SYNDROME: ICD-10-CM

## 2021-07-12 DIAGNOSIS — E21.3 HYPERPARATHYROIDISM, UNSPECIFIED: Primary | ICD-10-CM

## 2021-07-12 DIAGNOSIS — L60.3 ONYCHORRHEXIS: ICD-10-CM

## 2021-07-12 DIAGNOSIS — L21.9 SEBORRHEIC DERMATITIS: Primary | ICD-10-CM

## 2021-07-12 DIAGNOSIS — E04.2 NONTOXIC MULTINODULAR GOITER: ICD-10-CM

## 2021-07-12 DIAGNOSIS — L70.9 SAPHO SYNDROME: ICD-10-CM

## 2021-07-12 DIAGNOSIS — M86.9 SAPHO SYNDROME: ICD-10-CM

## 2021-07-12 LAB
25(OH)D3+25(OH)D2 SERPL-MCNC: 40 NG/ML (ref 30–96)
ALBUMIN SERPL BCP-MCNC: 4.1 G/DL (ref 3.5–5.2)
ALP SERPL-CCNC: 113 U/L (ref 55–135)
ALT SERPL W/O P-5'-P-CCNC: 14 U/L (ref 10–44)
ANION GAP SERPL CALC-SCNC: 10 MMOL/L (ref 8–16)
AST SERPL-CCNC: 21 U/L (ref 10–40)
BILIRUB SERPL-MCNC: 0.7 MG/DL (ref 0.1–1)
BUN SERPL-MCNC: 15 MG/DL (ref 8–23)
CALCIUM SERPL-MCNC: 11.1 MG/DL (ref 8.7–10.5)
CHLORIDE SERPL-SCNC: 102 MMOL/L (ref 95–110)
CO2 SERPL-SCNC: 26 MMOL/L (ref 23–29)
CREAT SERPL-MCNC: 1.1 MG/DL (ref 0.5–1.4)
EST. GFR  (AFRICAN AMERICAN): 54 ML/MIN/1.73 M^2
EST. GFR  (NON AFRICAN AMERICAN): 46.9 ML/MIN/1.73 M^2
GLUCOSE SERPL-MCNC: 95 MG/DL (ref 70–110)
POTASSIUM SERPL-SCNC: 4.1 MMOL/L (ref 3.5–5.1)
PROT SERPL-MCNC: 7.7 G/DL (ref 6–8.4)
PTH-INTACT SERPL-MCNC: 175 PG/ML (ref 9–77)
SODIUM SERPL-SCNC: 138 MMOL/L (ref 136–145)

## 2021-07-12 PROCEDURE — 99214 OFFICE O/P EST MOD 30 MIN: CPT | Mod: S$GLB,,, | Performed by: DERMATOLOGY

## 2021-07-12 PROCEDURE — 82306 VITAMIN D 25 HYDROXY: CPT | Performed by: INTERNAL MEDICINE

## 2021-07-12 PROCEDURE — 83970 ASSAY OF PARATHORMONE: CPT | Performed by: INTERNAL MEDICINE

## 2021-07-12 PROCEDURE — 80053 COMPREHEN METABOLIC PANEL: CPT | Performed by: INTERNAL MEDICINE

## 2021-07-12 PROCEDURE — 3288F PR FALLS RISK ASSESSMENT DOCUMENTED: ICD-10-PCS | Mod: CPTII,S$GLB,, | Performed by: DERMATOLOGY

## 2021-07-12 PROCEDURE — 1126F AMNT PAIN NOTED NONE PRSNT: CPT | Mod: S$GLB,,, | Performed by: DERMATOLOGY

## 2021-07-12 PROCEDURE — 99999 PR PBB SHADOW E&M-EST. PATIENT-LVL III: ICD-10-PCS | Mod: PBBFAC,,, | Performed by: DERMATOLOGY

## 2021-07-12 PROCEDURE — 99214 PR OFFICE/OUTPT VISIT, EST, LEVL IV, 30-39 MIN: ICD-10-PCS | Mod: S$GLB,,, | Performed by: DERMATOLOGY

## 2021-07-12 PROCEDURE — 1101F PR PT FALLS ASSESS DOC 0-1 FALLS W/OUT INJ PAST YR: ICD-10-PCS | Mod: CPTII,S$GLB,, | Performed by: DERMATOLOGY

## 2021-07-12 PROCEDURE — 1101F PT FALLS ASSESS-DOCD LE1/YR: CPT | Mod: CPTII,S$GLB,, | Performed by: DERMATOLOGY

## 2021-07-12 PROCEDURE — 99999 PR PBB SHADOW E&M-EST. PATIENT-LVL III: CPT | Mod: PBBFAC,,, | Performed by: DERMATOLOGY

## 2021-07-12 PROCEDURE — 3288F FALL RISK ASSESSMENT DOCD: CPT | Mod: CPTII,S$GLB,, | Performed by: DERMATOLOGY

## 2021-07-12 PROCEDURE — 36415 COLL VENOUS BLD VENIPUNCTURE: CPT | Mod: PN | Performed by: INTERNAL MEDICINE

## 2021-07-12 PROCEDURE — 1126F PR PAIN SEVERITY QUANTIFIED, NO PAIN PRESENT: ICD-10-PCS | Mod: S$GLB,,, | Performed by: DERMATOLOGY

## 2021-07-13 RX ORDER — FLUCONAZOLE 150 MG/1
150 TABLET ORAL DAILY
Qty: 1 TABLET | Refills: 0 | Status: SHIPPED | OUTPATIENT
Start: 2021-07-13 | End: 2021-07-14

## 2021-07-29 DIAGNOSIS — I67.9 CEREBROVASCULAR DISEASE: ICD-10-CM

## 2021-07-29 DIAGNOSIS — E78.00 PURE HYPERCHOLESTEROLEMIA: ICD-10-CM

## 2021-07-29 RX ORDER — CLOPIDOGREL BISULFATE 75 MG/1
75 TABLET ORAL DAILY
Qty: 90 TABLET | Refills: 1 | Status: SHIPPED | OUTPATIENT
Start: 2021-07-29 | End: 2022-01-02

## 2021-07-29 RX ORDER — ATORVASTATIN CALCIUM 80 MG/1
80 TABLET, FILM COATED ORAL DAILY
Qty: 90 TABLET | Refills: 1 | Status: SHIPPED | OUTPATIENT
Start: 2021-07-29 | End: 2022-01-02

## 2021-08-02 ENCOUNTER — TELEPHONE (OUTPATIENT)
Dept: PRIMARY CARE CLINIC | Facility: CLINIC | Age: 81
End: 2021-08-02

## 2021-08-04 ENCOUNTER — CLINICAL SUPPORT (OUTPATIENT)
Dept: URGENT CARE | Facility: CLINIC | Age: 81
End: 2021-08-04
Payer: MEDICARE

## 2021-08-04 DIAGNOSIS — Z20.822 EXPOSURE TO COVID-19 VIRUS: Primary | ICD-10-CM

## 2021-08-04 LAB
CTP QC/QA: YES
SARS-COV-2 RDRP RESP QL NAA+PROBE: NEGATIVE

## 2021-08-04 PROCEDURE — U0002: ICD-10-PCS | Mod: QW,S$GLB,, | Performed by: FAMILY MEDICINE

## 2021-08-04 PROCEDURE — U0002 COVID-19 LAB TEST NON-CDC: HCPCS | Mod: QW,S$GLB,, | Performed by: FAMILY MEDICINE

## 2021-11-05 ENCOUNTER — OFFICE VISIT (OUTPATIENT)
Dept: PRIMARY CARE CLINIC | Facility: CLINIC | Age: 81
End: 2021-11-05
Payer: MEDICARE

## 2021-11-05 VITALS
HEART RATE: 74 BPM | OXYGEN SATURATION: 98 % | WEIGHT: 164.25 LBS | HEIGHT: 63 IN | RESPIRATION RATE: 18 BRPM | BODY MASS INDEX: 29.1 KG/M2 | SYSTOLIC BLOOD PRESSURE: 122 MMHG | DIASTOLIC BLOOD PRESSURE: 80 MMHG | TEMPERATURE: 98 F

## 2021-11-05 DIAGNOSIS — E78.00 PURE HYPERCHOLESTEROLEMIA: ICD-10-CM

## 2021-11-05 DIAGNOSIS — Q21.12 PFO (PATENT FORAMEN OVALE): ICD-10-CM

## 2021-11-05 DIAGNOSIS — E21.3 HYPERPARATHYROIDISM: ICD-10-CM

## 2021-11-05 DIAGNOSIS — Z12.31 SCREENING MAMMOGRAM, ENCOUNTER FOR: ICD-10-CM

## 2021-11-05 DIAGNOSIS — I10 ESSENTIAL HYPERTENSION: Primary | ICD-10-CM

## 2021-11-05 DIAGNOSIS — G60.9 IDIOPATHIC PERIPHERAL NEUROPATHY: ICD-10-CM

## 2021-11-05 DIAGNOSIS — M85.80 OSTEOPENIA, UNSPECIFIED LOCATION: ICD-10-CM

## 2021-11-05 DIAGNOSIS — E83.52 HYPERCALCEMIA: ICD-10-CM

## 2021-11-05 DIAGNOSIS — R41.3 MEMORY IMPAIRMENT: ICD-10-CM

## 2021-11-05 DIAGNOSIS — I67.9 CEREBROVASCULAR DISEASE: ICD-10-CM

## 2021-11-05 PROCEDURE — 99499 RISK ADDL DX/OHS AUDIT: ICD-10-PCS | Mod: S$GLB,,, | Performed by: INTERNAL MEDICINE

## 2021-11-05 PROCEDURE — 1160F RVW MEDS BY RX/DR IN RCRD: CPT | Mod: CPTII,S$GLB,, | Performed by: INTERNAL MEDICINE

## 2021-11-05 PROCEDURE — 1160F PR REVIEW ALL MEDS BY PRESCRIBER/CLIN PHARMACIST DOCUMENTED: ICD-10-PCS | Mod: CPTII,S$GLB,, | Performed by: INTERNAL MEDICINE

## 2021-11-05 PROCEDURE — 3288F PR FALLS RISK ASSESSMENT DOCUMENTED: ICD-10-PCS | Mod: CPTII,S$GLB,, | Performed by: INTERNAL MEDICINE

## 2021-11-05 PROCEDURE — 1101F PR PT FALLS ASSESS DOC 0-1 FALLS W/OUT INJ PAST YR: ICD-10-PCS | Mod: CPTII,S$GLB,, | Performed by: INTERNAL MEDICINE

## 2021-11-05 PROCEDURE — 99214 OFFICE O/P EST MOD 30 MIN: CPT | Mod: S$GLB,,, | Performed by: INTERNAL MEDICINE

## 2021-11-05 PROCEDURE — 3079F DIAST BP 80-89 MM HG: CPT | Mod: CPTII,S$GLB,, | Performed by: INTERNAL MEDICINE

## 2021-11-05 PROCEDURE — 3074F SYST BP LT 130 MM HG: CPT | Mod: CPTII,S$GLB,, | Performed by: INTERNAL MEDICINE

## 2021-11-05 PROCEDURE — 3079F PR MOST RECENT DIASTOLIC BLOOD PRESSURE 80-89 MM HG: ICD-10-PCS | Mod: CPTII,S$GLB,, | Performed by: INTERNAL MEDICINE

## 2021-11-05 PROCEDURE — 99999 PR PBB SHADOW E&M-EST. PATIENT-LVL V: CPT | Mod: PBBFAC,,, | Performed by: INTERNAL MEDICINE

## 2021-11-05 PROCEDURE — 3288F FALL RISK ASSESSMENT DOCD: CPT | Mod: CPTII,S$GLB,, | Performed by: INTERNAL MEDICINE

## 2021-11-05 PROCEDURE — 1126F PR PAIN SEVERITY QUANTIFIED, NO PAIN PRESENT: ICD-10-PCS | Mod: CPTII,S$GLB,, | Performed by: INTERNAL MEDICINE

## 2021-11-05 PROCEDURE — 99499 UNLISTED E&M SERVICE: CPT | Mod: S$GLB,,, | Performed by: INTERNAL MEDICINE

## 2021-11-05 PROCEDURE — 1101F PT FALLS ASSESS-DOCD LE1/YR: CPT | Mod: CPTII,S$GLB,, | Performed by: INTERNAL MEDICINE

## 2021-11-05 PROCEDURE — 99999 PR PBB SHADOW E&M-EST. PATIENT-LVL V: ICD-10-PCS | Mod: PBBFAC,,, | Performed by: INTERNAL MEDICINE

## 2021-11-05 PROCEDURE — 99214 PR OFFICE/OUTPT VISIT, EST, LEVL IV, 30-39 MIN: ICD-10-PCS | Mod: S$GLB,,, | Performed by: INTERNAL MEDICINE

## 2021-11-05 PROCEDURE — 3074F PR MOST RECENT SYSTOLIC BLOOD PRESSURE < 130 MM HG: ICD-10-PCS | Mod: CPTII,S$GLB,, | Performed by: INTERNAL MEDICINE

## 2021-11-05 PROCEDURE — 1159F PR MEDICATION LIST DOCUMENTED IN MEDICAL RECORD: ICD-10-PCS | Mod: CPTII,S$GLB,, | Performed by: INTERNAL MEDICINE

## 2021-11-05 PROCEDURE — 1159F MED LIST DOCD IN RCRD: CPT | Mod: CPTII,S$GLB,, | Performed by: INTERNAL MEDICINE

## 2021-11-05 PROCEDURE — 1126F AMNT PAIN NOTED NONE PRSNT: CPT | Mod: CPTII,S$GLB,, | Performed by: INTERNAL MEDICINE

## 2021-11-09 ENCOUNTER — TELEPHONE (OUTPATIENT)
Dept: CARDIOLOGY | Facility: CLINIC | Age: 81
End: 2021-11-09
Payer: MEDICARE

## 2021-12-22 ENCOUNTER — TELEPHONE (OUTPATIENT)
Dept: PRIMARY CARE CLINIC | Facility: CLINIC | Age: 81
End: 2021-12-22
Payer: MEDICARE

## 2021-12-29 ENCOUNTER — TELEPHONE (OUTPATIENT)
Dept: PRIMARY CARE CLINIC | Facility: CLINIC | Age: 81
End: 2021-12-29
Payer: MEDICARE

## 2021-12-29 NOTE — TELEPHONE ENCOUNTER
lvm requesting pt contact the office to discuss mammo results from dis    Per dr jones no mammographic evidence of malignancy. repeat in 1 year

## 2022-01-13 ENCOUNTER — TELEPHONE (OUTPATIENT)
Dept: CARDIOLOGY | Facility: CLINIC | Age: 82
End: 2022-01-13
Payer: MEDICARE

## 2022-01-13 NOTE — TELEPHONE ENCOUNTER
Returned call to patient. Informed patient that she had fasting lab orders in the computer.    ----- Message from Srinivas Javed sent at 1/13/2022 11:41 AM CST -----  Regarding: Pt Advice  Contact: QUYNH QUINTANILLA [[id number]]  Name of Who is Calling: QUYNH QUINTANILLA [[id number]]      What is the request in detail: Would like to speak with staff in regards to knowing if blood work is needed before her  appt. Please advise      Can the clinic reply by [facility]: no      What Number to Call Back if not in [facility]: [phone]

## 2022-01-19 DIAGNOSIS — I10 ESSENTIAL HYPERTENSION: ICD-10-CM

## 2022-01-19 NOTE — TELEPHONE ENCOUNTER
No new care gaps identified.  Powered by [employer]. Reference number: [id number].   1/19/2022 12:05:39 AM CST

## 2022-01-23 DIAGNOSIS — I10 ESSENTIAL HYPERTENSION: ICD-10-CM

## 2022-01-23 RX ORDER — HYDROCHLOROTHIAZIDE 12.5 MG/1
CAPSULE ORAL
Qty: 90 CAPSULE | Refills: 1 | OUTPATIENT
Start: 2022-01-23

## 2022-01-23 NOTE — TELEPHONE ENCOUNTER
No new care gaps identified.  Powered by [employer]. Reference number: [id number].   1/23/2022 6:56:21 AM CST

## 2022-01-23 NOTE — TELEPHONE ENCOUNTER
Quick DC. Inappropriate Request    Refill Authorization Note   Lori Pulido  is requesting a refill authorization.  Brief Assessment and Rationale for Refill:  Quick Discontinue  Medication Therapy Plan:  Hydrochlorothiazide dc'd (11/9/21)    Medication Reconciliation Completed:  No      Comments:   Pended Medication(s)       Requested Prescriptions     Refused Prescriptions Disp Refills    hydroCHLOROthiazide (MICROZIDE) 12.5 mg capsule [Pharmacy Med Name: HYDROCHLOROTHIAZIDE 12.5 MG CP] 90 capsule 1     Sig: TAKE 1 CAPSULE BY MOUTH ONCE DAILY     Refused By: GABINO BULLOCK     Reason for Refusal: Refill not appropriate        Duplicate Pended Encounter(s)/ Last Prescribed Details: (includes pharmacy & prescriber details)    Telephone on 11/9/2021       Note composed:12:14 AM 01/23/2022

## 2022-01-24 ENCOUNTER — LAB VISIT (OUTPATIENT)
Dept: LAB | Facility: HOSPITAL | Age: 82
End: 2022-01-24
Attending: INTERNAL MEDICINE
Payer: MEDICARE

## 2022-01-24 ENCOUNTER — PATIENT OUTREACH (OUTPATIENT)
Dept: ADMINISTRATIVE | Facility: OTHER | Age: 82
End: 2022-01-24
Payer: MEDICARE

## 2022-01-24 DIAGNOSIS — I49.1 PREMATURE ATRIAL CONTRACTIONS: ICD-10-CM

## 2022-01-24 DIAGNOSIS — I10 ESSENTIAL HYPERTENSION: ICD-10-CM

## 2022-01-24 DIAGNOSIS — E78.2 MIXED HYPERLIPIDEMIA: ICD-10-CM

## 2022-01-24 DIAGNOSIS — I63.40 CEREBROVASCULAR ACCIDENT (CVA) DUE TO EMBOLISM OF CEREBRAL ARTERY: ICD-10-CM

## 2022-01-24 DIAGNOSIS — E78.00 PURE HYPERCHOLESTEROLEMIA: ICD-10-CM

## 2022-01-24 DIAGNOSIS — I07.1 TRICUSPID VALVE INSUFFICIENCY, UNSPECIFIED ETIOLOGY: ICD-10-CM

## 2022-01-24 DIAGNOSIS — Q21.12 PFO (PATENT FORAMEN OVALE): ICD-10-CM

## 2022-01-24 LAB
ALBUMIN SERPL BCP-MCNC: 3.9 G/DL (ref 3.5–5.2)
ALP SERPL-CCNC: 107 U/L (ref 55–135)
ALT SERPL W/O P-5'-P-CCNC: 13 U/L (ref 10–44)
ANION GAP SERPL CALC-SCNC: 11 MMOL/L (ref 8–16)
AST SERPL-CCNC: 16 U/L (ref 10–40)
BASOPHILS # BLD AUTO: 0.05 K/UL (ref 0–0.2)
BASOPHILS NFR BLD: 0.7 % (ref 0–1.9)
BILIRUB SERPL-MCNC: 0.6 MG/DL (ref 0.1–1)
BUN SERPL-MCNC: 15 MG/DL (ref 8–23)
CALCIUM SERPL-MCNC: 11 MG/DL (ref 8.7–10.5)
CHLORIDE SERPL-SCNC: 108 MMOL/L (ref 95–110)
CHOLEST SERPL-MCNC: 143 MG/DL (ref 120–199)
CHOLEST/HDLC SERPL: 2.6 {RATIO} (ref 2–5)
CO2 SERPL-SCNC: 22 MMOL/L (ref 23–29)
CREAT SERPL-MCNC: 0.8 MG/DL (ref 0.5–1.4)
DIFFERENTIAL METHOD: ABNORMAL
EOSINOPHIL # BLD AUTO: 0.2 K/UL (ref 0–0.5)
EOSINOPHIL NFR BLD: 3.1 % (ref 0–8)
ERYTHROCYTE [DISTWIDTH] IN BLOOD BY AUTOMATED COUNT: 12.5 % (ref 11.5–14.5)
EST. GFR  (AFRICAN AMERICAN): >60 ML/MIN/1.73 M^2
EST. GFR  (NON AFRICAN AMERICAN): >60 ML/MIN/1.73 M^2
GLUCOSE SERPL-MCNC: 85 MG/DL (ref 70–110)
HCT VFR BLD AUTO: 40.1 % (ref 37–48.5)
HDLC SERPL-MCNC: 54 MG/DL (ref 40–75)
HDLC SERPL: 37.8 % (ref 20–50)
HGB BLD-MCNC: 12.1 G/DL (ref 12–16)
IMM GRANULOCYTES # BLD AUTO: 0.02 K/UL (ref 0–0.04)
IMM GRANULOCYTES NFR BLD AUTO: 0.3 % (ref 0–0.5)
LDLC SERPL CALC-MCNC: 65.4 MG/DL (ref 63–159)
LYMPHOCYTES # BLD AUTO: 2.3 K/UL (ref 1–4.8)
LYMPHOCYTES NFR BLD: 35.1 % (ref 18–48)
MCH RBC QN AUTO: 31.9 PG (ref 27–31)
MCHC RBC AUTO-ENTMCNC: 30.2 G/DL (ref 32–36)
MCV RBC AUTO: 106 FL (ref 82–98)
MONOCYTES # BLD AUTO: 0.5 K/UL (ref 0.3–1)
MONOCYTES NFR BLD: 6.9 % (ref 4–15)
NEUTROPHILS # BLD AUTO: 3.6 K/UL (ref 1.8–7.7)
NEUTROPHILS NFR BLD: 53.9 % (ref 38–73)
NONHDLC SERPL-MCNC: 89 MG/DL
NRBC BLD-RTO: 0 /100 WBC
PLATELET # BLD AUTO: 195 K/UL (ref 150–450)
PMV BLD AUTO: 12 FL (ref 9.2–12.9)
POTASSIUM SERPL-SCNC: 4.2 MMOL/L (ref 3.5–5.1)
PROT SERPL-MCNC: 7.4 G/DL (ref 6–8.4)
RBC # BLD AUTO: 3.79 M/UL (ref 4–5.4)
SODIUM SERPL-SCNC: 141 MMOL/L (ref 136–145)
TRIGL SERPL-MCNC: 118 MG/DL (ref 30–150)
TSH SERPL DL<=0.005 MIU/L-ACNC: 1.99 UIU/ML (ref 0.4–4)
WBC # BLD AUTO: 6.67 K/UL (ref 3.9–12.7)

## 2022-01-24 PROCEDURE — 80061 LIPID PANEL: CPT | Performed by: INTERNAL MEDICINE

## 2022-01-24 PROCEDURE — 85025 COMPLETE CBC W/AUTO DIFF WBC: CPT | Performed by: INTERNAL MEDICINE

## 2022-01-24 PROCEDURE — 36415 COLL VENOUS BLD VENIPUNCTURE: CPT | Mod: PN | Performed by: INTERNAL MEDICINE

## 2022-01-24 PROCEDURE — 80053 COMPREHEN METABOLIC PANEL: CPT | Performed by: INTERNAL MEDICINE

## 2022-01-24 PROCEDURE — 84443 ASSAY THYROID STIM HORMONE: CPT | Performed by: INTERNAL MEDICINE

## 2022-01-25 ENCOUNTER — OFFICE VISIT (OUTPATIENT)
Dept: CARDIOLOGY | Facility: CLINIC | Age: 82
End: 2022-01-25
Payer: MEDICARE

## 2022-01-25 VITALS
HEART RATE: 88 BPM | SYSTOLIC BLOOD PRESSURE: 130 MMHG | DIASTOLIC BLOOD PRESSURE: 80 MMHG | OXYGEN SATURATION: 95 % | HEIGHT: 63 IN | BODY MASS INDEX: 29.54 KG/M2 | WEIGHT: 166.69 LBS

## 2022-01-25 DIAGNOSIS — E21.3 HYPERPARATHYROIDISM: ICD-10-CM

## 2022-01-25 DIAGNOSIS — I63.19 CEREBROVASCULAR ACCIDENT (CVA) DUE TO EMBOLISM OF OTHER PRECEREBRAL ARTERY: Primary | ICD-10-CM

## 2022-01-25 DIAGNOSIS — I49.1 PREMATURE ATRIAL CONTRACTIONS: ICD-10-CM

## 2022-01-25 DIAGNOSIS — E78.00 PURE HYPERCHOLESTEROLEMIA: ICD-10-CM

## 2022-01-25 DIAGNOSIS — Z86.718 H/O DEEP VENOUS THROMBOSIS: ICD-10-CM

## 2022-01-25 DIAGNOSIS — Q21.12 PFO (PATENT FORAMEN OVALE): ICD-10-CM

## 2022-01-25 DIAGNOSIS — I10 ESSENTIAL HYPERTENSION: ICD-10-CM

## 2022-01-25 DIAGNOSIS — G60.9 IDIOPATHIC PERIPHERAL NEUROPATHY: ICD-10-CM

## 2022-01-25 PROCEDURE — 3075F PR MOST RECENT SYSTOLIC BLOOD PRESS GE 130-139MM HG: ICD-10-PCS | Mod: CPTII,S$GLB,, | Performed by: INTERNAL MEDICINE

## 2022-01-25 PROCEDURE — 1160F PR REVIEW ALL MEDS BY PRESCRIBER/CLIN PHARMACIST DOCUMENTED: ICD-10-PCS | Mod: CPTII,S$GLB,, | Performed by: INTERNAL MEDICINE

## 2022-01-25 PROCEDURE — 3079F DIAST BP 80-89 MM HG: CPT | Mod: CPTII,S$GLB,, | Performed by: INTERNAL MEDICINE

## 2022-01-25 PROCEDURE — 99214 OFFICE O/P EST MOD 30 MIN: CPT | Mod: 25,S$GLB,, | Performed by: INTERNAL MEDICINE

## 2022-01-25 PROCEDURE — 3288F FALL RISK ASSESSMENT DOCD: CPT | Mod: CPTII,S$GLB,, | Performed by: INTERNAL MEDICINE

## 2022-01-25 PROCEDURE — 1159F PR MEDICATION LIST DOCUMENTED IN MEDICAL RECORD: ICD-10-PCS | Mod: CPTII,S$GLB,, | Performed by: INTERNAL MEDICINE

## 2022-01-25 PROCEDURE — 99499 RISK ADDL DX/OHS AUDIT: ICD-10-PCS | Mod: S$GLB,,, | Performed by: INTERNAL MEDICINE

## 2022-01-25 PROCEDURE — 1159F MED LIST DOCD IN RCRD: CPT | Mod: CPTII,S$GLB,, | Performed by: INTERNAL MEDICINE

## 2022-01-25 PROCEDURE — 99999 PR PBB SHADOW E&M-EST. PATIENT-LVL III: CPT | Mod: PBBFAC,,, | Performed by: INTERNAL MEDICINE

## 2022-01-25 PROCEDURE — 99214 PR OFFICE/OUTPT VISIT, EST, LEVL IV, 30-39 MIN: ICD-10-PCS | Mod: 25,S$GLB,, | Performed by: INTERNAL MEDICINE

## 2022-01-25 PROCEDURE — 1126F PR PAIN SEVERITY QUANTIFIED, NO PAIN PRESENT: ICD-10-PCS | Mod: CPTII,S$GLB,, | Performed by: INTERNAL MEDICINE

## 2022-01-25 PROCEDURE — 93000 ELECTROCARDIOGRAM COMPLETE: CPT | Mod: S$GLB,,, | Performed by: INTERNAL MEDICINE

## 2022-01-25 PROCEDURE — 1160F RVW MEDS BY RX/DR IN RCRD: CPT | Mod: CPTII,S$GLB,, | Performed by: INTERNAL MEDICINE

## 2022-01-25 PROCEDURE — 1101F PR PT FALLS ASSESS DOC 0-1 FALLS W/OUT INJ PAST YR: ICD-10-PCS | Mod: CPTII,S$GLB,, | Performed by: INTERNAL MEDICINE

## 2022-01-25 PROCEDURE — 3075F SYST BP GE 130 - 139MM HG: CPT | Mod: CPTII,S$GLB,, | Performed by: INTERNAL MEDICINE

## 2022-01-25 PROCEDURE — 3079F PR MOST RECENT DIASTOLIC BLOOD PRESSURE 80-89 MM HG: ICD-10-PCS | Mod: CPTII,S$GLB,, | Performed by: INTERNAL MEDICINE

## 2022-01-25 PROCEDURE — 1101F PT FALLS ASSESS-DOCD LE1/YR: CPT | Mod: CPTII,S$GLB,, | Performed by: INTERNAL MEDICINE

## 2022-01-25 PROCEDURE — 99499 UNLISTED E&M SERVICE: CPT | Mod: S$GLB,,, | Performed by: INTERNAL MEDICINE

## 2022-01-25 PROCEDURE — 99999 PR PBB SHADOW E&M-EST. PATIENT-LVL III: ICD-10-PCS | Mod: PBBFAC,,, | Performed by: INTERNAL MEDICINE

## 2022-01-25 PROCEDURE — 1126F AMNT PAIN NOTED NONE PRSNT: CPT | Mod: CPTII,S$GLB,, | Performed by: INTERNAL MEDICINE

## 2022-01-25 PROCEDURE — 3288F PR FALLS RISK ASSESSMENT DOCUMENTED: ICD-10-PCS | Mod: CPTII,S$GLB,, | Performed by: INTERNAL MEDICINE

## 2022-01-25 PROCEDURE — 93000 EKG 12-LEAD: ICD-10-PCS | Mod: S$GLB,,, | Performed by: INTERNAL MEDICINE

## 2022-01-25 NOTE — PROGRESS NOTES
Subjective:      Patient ID: Lori Pulido is a 82 y.o. female.    Chief Complaint: Follow-up and Hypertension    HPI:  Pt went to PT for a month for bilateral buttock and posterior thigh pain upon arising.  Pt tries to do some stretching.    At time of stroke and hospitalization at [facility] pt had weakness of RUE and had trouble writing.    Pt has some residual mild difficulty writing.    Pt felt short of breath walking into MOB and walking up steps this AM.    Review of Systems   Cardiovascular: Positive for dyspnea on exertion. Negative for chest pain, claudication, irregular heartbeat, leg swelling, near-syncope, orthopnea, palpitations and syncope.        Toes feel numb    Finger feel numb at times.  Pt saw hand specialist last year and was told she did not have carpal tunnel.    Pt has chronic low back pain.      Past Medical History:   Diagnosis Date    Allergic rhinitis     Choledocholithiasis     DVT (deep venous thrombosis)     after TKA    GERD (gastroesophageal reflux disease)     Hemorrhoids     Hyperlipidemia     Hypertension     Neuropathy     Nicotine dependence     Stroke     Stroke 2020        Past Surgical History:   Procedure Laterality Date    APPENDECTOMY      BREAST SURGERY      lumpectomy    CARPAL TUNNEL RELEASE      CATARACT EXTRACTION BILATERAL W/ ANTERIOR VITRECTOMY       SECTION      HYSTERECTOMY      partial    LAPAROSCOPIC CHOLECYSTECTOMY WITH CHOLANGIOGRAPHY  2019    TOTAL KNEE ARTHROPLASTY      L knee       Family History   Problem Relation Age of Onset    Heart disease Father     Stroke Mother 80    Cancer Sister 89        breast cancer    Dementia Sister     Aortic dissection Sister     Stroke Brother 68    Coronary artery disease Brother         s/p CABG    Cerebral aneurysm Other     Cerebral aneurysm Other     Other Sister         carotid atherosclerosis       Social History     Socioeconomic History    Marital status:     Tobacco Use    Smoking status: Former Smoker     Years: 20.00     Types: Cigarettes    Smokeless tobacco: Never Used    Tobacco comment: 1 pack every 2-3 wks   Substance and Sexual Activity    Alcohol use: No    Drug use: Never    Sexual activity: Not Currently       Current Outpatient Medications on File Prior to Visit   Medication Sig Dispense Refill    acetaminophen (TYLENOL) 325 MG tablet Take 325 mg by mouth every 6 (six) hours as needed for Pain.      aspirin (ECOTRIN) 81 MG EC tablet Take 81 mg by mouth once daily.      atorvastatin (LIPITOR) 80 MG tablet TAKE 1 TABLET BY MOUTH EVERY DAY 90 tablet 1    clopidogreL (PLAVIX) 75 mg tablet TAKE 1 TABLET BY MOUTH EVERY DAY 90 tablet 1    cyanocobalamin (VITAMIN B-12) 1000 MCG tablet Take 100 mcg by mouth once daily.      ergocalciferol, vitamin D2, (VITAMIN D ORAL) Take by mouth.      famotidine (PEPCID) 40 MG tablet Take 40 mg by mouth 2 (two) times daily.      fluticasone propionate (CUTIVATE) 0.05 % cream AAA bid 60 g 3    gabapentin (NEURONTIN) 300 MG capsule TAKE 1-2 CAPSULES BY MOUTH EVERY NIGHT AT BEDTIME 180 capsule 1    ketoconazole (NIZORAL) 2 % shampoo Wash hair with medicated shampoo at least 2x/week - let sit on scalp at least 5 minutes prior to rinsing 120 mL 5    loratadine (CLARITIN) 10 mg tablet Take by mouth.      losartan (COZAAR) 50 MG tablet TAKE 0.5 TABLETS (25 MG TOTAL) BY MOUTH ONCE DAILY. 45 tablet 1    mometasone (ELOCON) 0.1 % solution Apply topically once daily. To scalp 60 mL 5    triamcinolone acetonide 0.1% (KENALOG) 0.1 % ointment       ursodioL (ACTIGALL) 250 mg Tab TAKE TABLET BY MOUTH THREE TIMES A DAY       No current facility-administered medications on file prior to visit.       Review of patient's allergies indicates:   Allergen Reactions    Morphine      Itching/Hives    Penicillins      Objective:     Vitals:    01/25/22 0934   BP: 130/80   BP Location: Left arm   Patient Position: Sitting   BP  "Method: Large (Manual)   Pulse: 88   SpO2: 95%   Weight: 75.6 kg (166 lb 10.7 oz)   Height: 5' 3" (1.6 m)        Physical Exam  Vitals reviewed.   Constitutional:       Appearance: She is well-developed and well-nourished.   Eyes:      General: No scleral icterus.  Neck:      Vascular: No carotid bruit or JVD.   Cardiovascular:      Rate and Rhythm: Normal rate and regular rhythm.      Heart sounds: No murmur heard.  No gallop.    Pulmonary:      Breath sounds: Normal breath sounds.   Musculoskeletal:         General: No edema.      Right lower leg: No edema.      Left lower leg: No edema.   Skin:     General: Skin is warm and dry.   Neurological:      Mental Status: She is alert and oriented to person, place, and time.   Psychiatric:         Mood and Affect: Mood and affect normal.         Behavior: Behavior normal.         Thought Content: Thought content normal.         Judgment: Judgment normal.        ECG today reviewed by me: JEMMA MORAN      Accession #: [id number]    [facility]  Cardiac event monitor  Order# [id number]  Reading physician: Loc España MD Ordering physician: Remigio Lewis MD Study date: 5/28/21       Reason for Exam  Priority: Routine  Dx: PFO (patent foramen ovale) [Q21.1 (ICD-10-CM)]; Transient cerebral ischemia, unspecified type [G45.9 (ICD-10-CM)]     Conclusion    · Negative event monitor with no clinical arrhythmias.  · Symptoms corresponding with normal sinus rhythm.  · Asymptomatic non-sustained atrial tachycardia lastint 28 beats             Result Image Hyperlink     Show images for Cardiac event monitor         Lab Visit on 01/24/2022   Component Date Value Ref Range Status    WBC 01/24/2022 6.67  3.90 - 12.70 K/uL Final    RBC 01/24/2022 3.79* 4.00 - 5.40 M/uL Final    Hemoglobin 01/24/2022 12.1  12.0 - 16.0 g/dL Final    Hematocrit 01/24/2022 40.1  37.0 - 48.5 % Final    MCV 01/24/2022 106* 82 - 98 fL Final    MCH 01/24/2022 31.9* 27.0 - 31.0 pg Final    MCHC " 01/24/2022 30.2* 32.0 - 36.0 g/dL Final    RDW 01/24/2022 12.5  11.5 - 14.5 % Final    Platelets 01/24/2022 195  150 - 450 K/uL Final    MPV 01/24/2022 12.0  9.2 - 12.9 fL Final    Immature Granulocytes 01/24/2022 0.3  0.0 - 0.5 % Final    Gran # (ANC) 01/24/2022 3.6  1.8 - 7.7 K/uL Final    Immature Grans (Abs) 01/24/2022 0.02  0.00 - 0.04 K/uL Final    Lymph # 01/24/2022 2.3  1.0 - 4.8 K/uL Final    Mono # 01/24/2022 0.5  0.3 - 1.0 K/uL Final    Eos # 01/24/2022 0.2  0.0 - 0.5 K/uL Final    Baso # 01/24/2022 0.05  0.00 - 0.20 K/uL Final    nRBC 01/24/2022 0  0 /100 WBC Final    Gran % 01/24/2022 53.9  38.0 - 73.0 % Final    Lymph % 01/24/2022 35.1  18.0 - 48.0 % Final    Mono % 01/24/2022 6.9  4.0 - 15.0 % Final    Eosinophil % 01/24/2022 3.1  0.0 - 8.0 % Final    Basophil % 01/24/2022 0.7  0.0 - 1.9 % Final    Differential Method 01/24/2022 Automated   Final    Sodium 01/24/2022 141  136 - 145 mmol/L Final    Potassium 01/24/2022 4.2  3.5 - 5.1 mmol/L Final    Chloride 01/24/2022 108  95 - 110 mmol/L Final    CO2 01/24/2022 22* 23 - 29 mmol/L Final    Glucose 01/24/2022 85  70 - 110 mg/dL Final    BUN 01/24/2022 15  8 - 23 mg/dL Final    Creatinine 01/24/2022 0.8  0.5 - 1.4 mg/dL Final    Calcium 01/24/2022 11.0* 8.7 - 10.5 mg/dL Final    Total Protein 01/24/2022 7.4  6.0 - 8.4 g/dL Final    Albumin 01/24/2022 3.9  3.5 - 5.2 g/dL Final    Total Bilirubin 01/24/2022 0.6  0.1 - 1.0 mg/dL Final    Alkaline Phosphatase 01/24/2022 107  55 - 135 U/L Final    AST 01/24/2022 16  10 - 40 U/L Final    ALT 01/24/2022 13  10 - 44 U/L Final    Anion Gap 01/24/2022 11  8 - 16 mmol/L Final    eGFR if African American 01/24/2022 >60.0  >60 mL/min/1.73 m^2 Final    eGFR if non African American 01/24/2022 >60.0  >60 mL/min/1.73 m^2 Final    Cholesterol 01/24/2022 143  120 - 199 mg/dL Final    Triglycerides 01/24/2022 118  30 - 150 mg/dL Final    HDL 01/24/2022 54  40 - 75 mg/dL Final    LDL  "Cholesterol 01/24/2022 65.4  63.0 - 159.0 mg/dL Final    HDL/Cholesterol Ratio 01/24/2022 37.8  20.0 - 50.0 % Final    Total Cholesterol/HDL Ratio 01/24/2022 2.6  2.0 - 5.0 Final    Non-HDL Cholesterol 01/24/2022 89  mg/dL Final    TSH 01/24/2022 1.988  0.400 - 4.000 uIU/mL Final   Clinical Support on 08/04/2021   Component Date Value Ref Range Status    POC Rapid COVID 08/04/2021 Negative  Negative Final     Acceptable 08/04/2021 Yes   Final   (  Accession #: [id number]    Transthoracic echo (TTE) complete  Order# [id number]  Reading physician: Drake Fam MD Ordering physician: Remigio Lewis MD Study date: 5/28/21       Reason for Exam  Priority: Routine  Dx: PFO (patent foramen ovale) [Q21.1 (ICD-10-CM)]     Result Image Hyperlink     Show images for Echo Color Flow Doppler? Yes; Bubble Contrast? Yes    Summary    · The left ventricle is normal in size with normal systolic function.  · The estimated ejection fraction is 63%.  · Normal left ventricular diastolic function.  · Normal right ventricular size with normal right ventricular systolic function.  · There is a patent foramen ovale present with right to left shunting indicated by saline contrast.  · Moderate right atrial enlargement.  · Mild to moderate tricuspid regurgitation.  · Mild pulmonic regurgitation.  · Normal central venous pressure (3 mmHg).  · The estimated PA systolic pressure is 28 mmHg.         Vitals    Height Weight BMI (Calculated) BSA (Calculated - sq m) BP Pulse   5' 3" (1.6 m)                 (important suggestion)  Newer results are available. Click to view them now.     Component Ref Range & Units 1 yr ago   (11/9/20) 1 yr ago   (7/17/20)   PTH, Intact 9.0 - 77.0 pg/mL 189.0 High   173.0 High     Resulting Agency  [facility]              Specimen Collected: 11/09/20 08:13             Assessment:     1. Cerebrovascular accident (CVA) due to embolism of other precerebral artery    2. Essential hypertension    3. " Pure hypercholesterolemia    4. PFO (patent foramen ovale)    5. Premature atrial contractions    6. H/O deep venous thrombosis    7. Idiopathic peripheral neuropathy    8. Hyperparathyroidism      Plan:   Lori was seen today for follow-up and hypertension.    Diagnoses and all orders for this visit:    Cerebrovascular accident (CVA) due to embolism of other precerebral artery  -     IN OFFICE EKG 12-LEAD (to Muse)    Essential hypertension  -     IN OFFICE EKG 12-LEAD (to Muse)    Pure hypercholesterolemia  -     IN OFFICE EKG 12-LEAD (to Muse)    PFO (patent foramen ovale)  -     IN OFFICE EKG 12-LEAD (to Muse)    Premature atrial contractions  -     IN OFFICE EKG 12-LEAD (to Muse)    H/O deep venous thrombosis  -     IN OFFICE EKG 12-LEAD (to Muse)    Idiopathic peripheral neuropathy  -     IN OFFICE EKG 12-LEAD (to Muse)    Hyperparathyroidism       Pt's PFO is asymptomatic on ASA and clopidogrel.  Pt wishes to continue medical management rather than percutaneous PFO closure.    Pt instructed to f/u with endocrinologist, Dr Pat Jarrett for her hyperparathyroidism    Same meds    RTC 6 months     F/u with PCP as well    Follow up in about 6 months (around 7/25/2022).

## 2022-01-28 RX ORDER — LOSARTAN POTASSIUM 50 MG/1
25 TABLET ORAL DAILY
Qty: 45 TABLET | Refills: 3 | Status: SHIPPED | OUTPATIENT
Start: 2022-01-28 | End: 2023-04-12

## 2022-01-29 NOTE — TELEPHONE ENCOUNTER
Refill Authorization Note   Lori Pulido  is requesting a refill authorization.  Brief Assessment and Rationale for Refill:  Approve     Medication Therapy Plan:       Medication Reconciliation Completed: No   Comments:   --->Care Gap information included below if applicable.       Requested Prescriptions   Pending Prescriptions Disp Refills    losartan (COZAAR) 50 MG tablet [Pharmacy Med Name: LOSARTAN POTASSIUM 50 MG TAB] 45 tablet 3     Sig: TAKE 0.5 TABLETS (25 MG TOTAL) BY MOUTH ONCE DAILY.       Cardiovascular:  Angiotensin Receptor Blockers Passed - 1/23/2022  6:56 AM        Passed - Patient is at least 18 years old        Passed - Last BP in normal range within 360 days     BP Readings from Last 1 Encounters:   01/25/22 130/80               Passed - Valid encounter within last 15 months     Recent Visits  Date Type Provider Dept   11/05/21 Office Visit Fannie Du MD [facility]   07/09/21 Office Visit Fannie Du MD [facility]   05/12/21 Office Visit Fannie Du MD [facility]   02/11/21 Office Visit Fannie Du MD [facility]   11/11/20 Office Visit Fannie Du MD [facility]   07/08/20 Office Visit Fannie Du MD [facility]   Showing recent visits within past 720 days and meeting all other requirements  Future Appointments  No visits were found meeting these conditions.  Showing future appointments within next 150 days and meeting all other requirements      Future Appointments              In 1 month Nohemi Koenig MD [facility] - Dermatology, [facility]    In 3 months Fannie Du MD [facility] - Primary Care, [facility]    In 6 months Alonzo Stevens MD [facility] - Cardiology, [facility]                Passed - Cr is 1.39 or below and within 360 days     Lab Results   Component Value Date    CREATININE 0.8 01/24/2022    CREATININE 1.1 07/12/2021    CREATININE 1.0 06/03/2021               Passed - K is 5.2 or below and within 360 days     Potassium   Date Value Ref Range Status   01/24/2022 4.2 3.5 - 5.1 mmol/L Final   07/12/2021 4.1 3.5 - 5.1 mmol/L Final   06/03/2021 3.8 3.5 - 5.1 mmol/L Final              Passed - eGFR within 360 days     Lab Results   Component Value Date    EGFRNONAA >60.0 01/24/2022    EGFRNONAA 46.9 (A) 07/12/2021    EGFRNONAA 52.6 (A) 06/03/2021                    Appointments  past 12m or future 3m with PCP    Date Provider   Last Visit   11/5/2021 Fannie Du MD   Next Visit   5/6/2022 Fannie Du MD   ED visits in past 90 days: 0     Note composed:7:50 PM 01/28/2022

## 2022-02-09 ENCOUNTER — PES CALL (OUTPATIENT)
Dept: ADMINISTRATIVE | Facility: CLINIC | Age: 82
End: 2022-02-09
Payer: MEDICARE

## 2022-02-17 ENCOUNTER — TELEPHONE (OUTPATIENT)
Dept: DERMATOLOGY | Facility: CLINIC | Age: 82
End: 2022-02-17
Payer: MEDICARE

## 2022-02-17 NOTE — TELEPHONE ENCOUNTER
Pt called to get an sooner appt informed pt the next available appt we have would be in April/May pt have an appt with Dr Koenig in March but states she is in pain offer her an appt with Dr Hay she declined states that's [place] is [place] far so I informed her if she is pain that she should go to the nearest urgent care or ER.

## 2022-02-17 NOTE — TELEPHONE ENCOUNTER
----- Message from Marcelina Brito sent at 2/17/2022  8:19 AM CST -----  Regarding: Appt Access  Pt called and advised she needs to be seen today. Pt advised her condition has become worse. Requesting a call back to schedule.      [phone] (home)

## 2022-03-23 ENCOUNTER — OFFICE VISIT (OUTPATIENT)
Dept: DERMATOLOGY | Facility: CLINIC | Age: 82
End: 2022-03-23
Payer: MEDICARE

## 2022-03-23 DIAGNOSIS — D48.5 NEOPLASM OF UNCERTAIN BEHAVIOR OF SKIN: Primary | ICD-10-CM

## 2022-03-23 DIAGNOSIS — L21.9 SEBORRHEIC DERMATITIS: ICD-10-CM

## 2022-03-23 DIAGNOSIS — L82.0 SEBORRHEIC KERATOSES, INFLAMED: ICD-10-CM

## 2022-03-23 DIAGNOSIS — L30.4 INTERTRIGO: ICD-10-CM

## 2022-03-23 DIAGNOSIS — L29.9 BRACHIORADIAL PRURITUS: ICD-10-CM

## 2022-03-23 PROCEDURE — 3288F PR FALLS RISK ASSESSMENT DOCUMENTED: ICD-10-PCS | Mod: CPTII,S$GLB,, | Performed by: DERMATOLOGY

## 2022-03-23 PROCEDURE — 11102 PR TANGENTIAL BIOPSY, SKIN, SINGLE LESION: ICD-10-PCS | Mod: S$GLB,,, | Performed by: DERMATOLOGY

## 2022-03-23 PROCEDURE — 1101F PT FALLS ASSESS-DOCD LE1/YR: CPT | Mod: CPTII,S$GLB,, | Performed by: DERMATOLOGY

## 2022-03-23 PROCEDURE — 1126F AMNT PAIN NOTED NONE PRSNT: CPT | Mod: CPTII,S$GLB,, | Performed by: DERMATOLOGY

## 2022-03-23 PROCEDURE — 1126F PR PAIN SEVERITY QUANTIFIED, NO PAIN PRESENT: ICD-10-PCS | Mod: CPTII,S$GLB,, | Performed by: DERMATOLOGY

## 2022-03-23 PROCEDURE — 1159F MED LIST DOCD IN RCRD: CPT | Mod: CPTII,S$GLB,, | Performed by: DERMATOLOGY

## 2022-03-23 PROCEDURE — 99214 OFFICE O/P EST MOD 30 MIN: CPT | Mod: 25,S$GLB,, | Performed by: DERMATOLOGY

## 2022-03-23 PROCEDURE — 88305 TISSUE EXAM BY PATHOLOGIST: ICD-10-PCS | Mod: 26,,, | Performed by: PATHOLOGY

## 2022-03-23 PROCEDURE — 99999 PR PBB SHADOW E&M-EST. PATIENT-LVL III: ICD-10-PCS | Mod: PBBFAC,,, | Performed by: DERMATOLOGY

## 2022-03-23 PROCEDURE — 11102 TANGNTL BX SKIN SINGLE LES: CPT | Mod: S$GLB,,, | Performed by: DERMATOLOGY

## 2022-03-23 PROCEDURE — 1101F PR PT FALLS ASSESS DOC 0-1 FALLS W/OUT INJ PAST YR: ICD-10-PCS | Mod: CPTII,S$GLB,, | Performed by: DERMATOLOGY

## 2022-03-23 PROCEDURE — 99999 PR PBB SHADOW E&M-EST. PATIENT-LVL III: CPT | Mod: PBBFAC,,, | Performed by: DERMATOLOGY

## 2022-03-23 PROCEDURE — 1159F PR MEDICATION LIST DOCUMENTED IN MEDICAL RECORD: ICD-10-PCS | Mod: CPTII,S$GLB,, | Performed by: DERMATOLOGY

## 2022-03-23 PROCEDURE — 99214 PR OFFICE/OUTPT VISIT, EST, LEVL IV, 30-39 MIN: ICD-10-PCS | Mod: 25,S$GLB,, | Performed by: DERMATOLOGY

## 2022-03-23 PROCEDURE — 3288F FALL RISK ASSESSMENT DOCD: CPT | Mod: CPTII,S$GLB,, | Performed by: DERMATOLOGY

## 2022-03-23 PROCEDURE — 88305 TISSUE EXAM BY PATHOLOGIST: CPT | Performed by: PATHOLOGY

## 2022-03-23 PROCEDURE — 88305 TISSUE EXAM BY PATHOLOGIST: CPT | Mod: 26,,, | Performed by: PATHOLOGY

## 2022-03-23 RX ORDER — FLUOCINONIDE TOPICAL SOLUTION USP, 0.05% 0.5 MG/ML
SOLUTION TOPICAL
Qty: 60 ML | Refills: 3 | Status: SHIPPED | OUTPATIENT
Start: 2022-03-23

## 2022-03-23 NOTE — PROGRESS NOTES
Subjective:       Patient ID:  Lori Pulido is a 82 y.o. female who presents for   Chief Complaint   Patient presents with    Lesion     Pt c/o brown colored lesions on lower abdomen x several years. Lesions has increased in size. No bleeding, pain or prev tx.       Review of Systems   Skin: Negative for tendency to form keloidal scars.   Hematologic/Lymphatic: Bruises/bleeds easily.        Objective:    Physical Exam   Skin:   Areas Examined (abnormalities noted in diagram):   Abdomen Inspection Performed                   Diagram Legend     Erythematous scaling macule/papule c/w actinic keratosis       Vascular papule c/w angioma      Pigmented verrucoid papule/plaque c/w seborrheic keratosis      Yellow umbilicated papule c/w sebaceous hyperplasia      Irregularly shaped tan macule c/w lentigo     1-2 mm smooth white papules consistent with Milia      Movable subcutaneous cyst with punctum c/w epidermal inclusion cyst      Subcutaneous movable cyst c/w pilar cyst      Firm pink to brown papule c/w dermatofibroma      Pedunculated fleshy papule(s) c/w skin tag(s)      Evenly pigmented macule c/w junctional nevus     Mildly variegated pigmented, slightly irregular-bordered macule c/w mildly atypical nevus      Flesh colored to evenly pigmented papule c/w intradermal nevus       Pink pearly papule/plaque c/w basal cell carcinoma      Erythematous hyperkeratotic cursted plaque c/w SCC      Surgical scar with no sign of skin cancer recurrence      Open and closed comedones      Inflammatory papules and pustules      Verrucoid papule consistent consistent with wart     Erythematous eczematous patches and plaques     Dystrophic onycholytic nail with subungual debris c/w onychomycosis     Umbilicated papule    Erythematous-base heme-crusted tan verrucoid plaque consistent with inflamed seborrheic keratosis     Erythematous Silvery Scaling Plaque c/w Psoriasis     See annotation          Assessment / Plan:       Pathology Orders:     Normal Orders This Visit    Specimen to Pathology, Dermatology     Questions:    Procedure Type: Dermatology and skin neoplasms    Number of Specimens: 1    ------------------------: -------------------------    Spec 1 Procedure: Biopsy    Spec 1 Clinical Impression: r/o isk vs other    Spec 1 Source: right neck    Release to patient: Immediate        Neoplasm of uncertain behavior of skin  -     Specimen to Pathology, Dermatology  Shave biopsy procedure note:    Shave biopsy performed after verbal consent including risk of infection, scar, recurrence, need for additional treatment of site. Area prepped with alcohol, anesthetized with approximately 1.0cc of 1% lidocaine with epinephrine. Lesional tissue shaved with razor blade. Hemostasis achieved with application of aluminum chloride followed by hyfrecation. No complications. Dressing applied. Wound care explained.      Intertrigo  -     triamcinolone acetonide 0.1% (KENALOG) 0.1 % cream; AAA bid after cool blow dry  Dispense: 45 g; Refill: 4  Recommend white vinegar: water 1:1 compresses 2x/day followed by cool blow dry and then application of prescription medication.     Cool blow dry after showering. Once clear, use Zeasorb AF powder for maintenance.    Seborrheic keratoses  These are benign inherited growths without a malignant potential. Reassurance given to patient. No treatment is necessary.   -says they are itchy likely due to bile duct problems vs bp meds patient is very itchy all over currently on gabapentin/urodisol  - Has two large lesions on groin, counseled it is cosmetic to remove, will need to schedule 30 min procedure     Seborrheic dermatitis  - switch from mometasone solution to lidex today (stronger)  Or clobetasol or diprolene  Whichever is cheaper and covered   - if can't get covered for less than 45 $ will send to [facility] (email pharmacy)  Brachioradial pruritus  - in area of forearm, likely due to possible  ACE-I vs biliary duct problems  - continue urodisol for 2 months if not improving will consider switching BP meds, message sent to PCP  - start topical steroid cream TAC ointment to area can only use twice daily mix with cerave anti-itch cream when applying  - Use cerave anti- itch cream as often a needed put in fridge. Ice will help with itch as well.   - keep arm out of sun            Follow up in about 3 months (around 6/23/2022).

## 2022-03-23 NOTE — PATIENT INSTRUCTIONS
Recommend white vinegar: water 1:1 compresses 2x/day followed by cool blow dry and then application of prescription medication.     Cool blow dry after showering. Once clear, use Zeasorb AF powder for maintenance.      Seborrheic dermatitis  - switch from mometasone solution to lidex today (stronger)     Brachioradial pruritus  - in area of forearm, likely due to possible ACE-I vs biliary duct problems  - continue urodisol for 2 months if not improving will consider switching BP meds, message sent to PCP  - start topical steroid cream TAC ointment to area can only use twice daily mix with cerave anti-itch cream when applying  - Use cerave anti- itch cream as often a needed put in fridge. Ice will help with itch as well.   - keep arm out of sun

## 2022-03-23 NOTE — Clinical Note
Patient is experiencing itching on forearm, sometimes can be caused by ACE-I/ARB is there any other medicine we could switch too ? I know she is doing urodisol which that may help with itch too, if she isn't doing better in a couple months with urodiosol maybe its something we can explore

## 2022-03-30 ENCOUNTER — PES CALL (OUTPATIENT)
Dept: ADMINISTRATIVE | Facility: CLINIC | Age: 82
End: 2022-03-30
Payer: MEDICARE

## 2022-03-30 LAB
FINAL PATHOLOGIC DIAGNOSIS: NORMAL
GROSS: NORMAL
Lab: NORMAL
MICROSCOPIC EXAM: NORMAL

## 2022-05-06 ENCOUNTER — OFFICE VISIT (OUTPATIENT)
Dept: PRIMARY CARE CLINIC | Facility: CLINIC | Age: 82
End: 2022-05-06
Payer: MEDICARE

## 2022-05-06 VITALS
OXYGEN SATURATION: 97 % | TEMPERATURE: 99 F | BODY MASS INDEX: 29.1 KG/M2 | WEIGHT: 164.25 LBS | SYSTOLIC BLOOD PRESSURE: 122 MMHG | HEART RATE: 78 BPM | DIASTOLIC BLOOD PRESSURE: 72 MMHG | HEIGHT: 63 IN

## 2022-05-06 DIAGNOSIS — Q21.12 PFO (PATENT FORAMEN OVALE): ICD-10-CM

## 2022-05-06 DIAGNOSIS — R41.3 MEMORY IMPAIRMENT: ICD-10-CM

## 2022-05-06 DIAGNOSIS — F17.210 CIGARETTE NICOTINE DEPENDENCE WITHOUT COMPLICATION: ICD-10-CM

## 2022-05-06 DIAGNOSIS — M85.80 OSTEOPENIA, UNSPECIFIED LOCATION: ICD-10-CM

## 2022-05-06 DIAGNOSIS — E78.00 PURE HYPERCHOLESTEROLEMIA: ICD-10-CM

## 2022-05-06 DIAGNOSIS — K80.50 CHOLEDOCHOLITHIASIS: ICD-10-CM

## 2022-05-06 DIAGNOSIS — I67.9 CEREBROVASCULAR DISEASE: ICD-10-CM

## 2022-05-06 DIAGNOSIS — G60.9 IDIOPATHIC PERIPHERAL NEUROPATHY: ICD-10-CM

## 2022-05-06 DIAGNOSIS — I10 ESSENTIAL HYPERTENSION: Primary | ICD-10-CM

## 2022-05-06 DIAGNOSIS — E83.52 HYPERCALCEMIA: ICD-10-CM

## 2022-05-06 DIAGNOSIS — L82.1 SEBORRHEIC KERATOSES: ICD-10-CM

## 2022-05-06 DIAGNOSIS — E21.3 HYPERPARATHYROIDISM: ICD-10-CM

## 2022-05-06 PROCEDURE — 99499 UNLISTED E&M SERVICE: CPT | Mod: S$GLB,,, | Performed by: INTERNAL MEDICINE

## 2022-05-06 PROCEDURE — 99499 RISK ADDL DX/OHS AUDIT: ICD-10-PCS | Mod: S$GLB,,, | Performed by: INTERNAL MEDICINE

## 2022-05-06 PROCEDURE — 1159F PR MEDICATION LIST DOCUMENTED IN MEDICAL RECORD: ICD-10-PCS | Mod: CPTII,S$GLB,, | Performed by: INTERNAL MEDICINE

## 2022-05-06 PROCEDURE — 99999 PR PBB SHADOW E&M-EST. PATIENT-LVL IV: ICD-10-PCS | Mod: PBBFAC,,, | Performed by: INTERNAL MEDICINE

## 2022-05-06 PROCEDURE — 3288F FALL RISK ASSESSMENT DOCD: CPT | Mod: CPTII,S$GLB,, | Performed by: INTERNAL MEDICINE

## 2022-05-06 PROCEDURE — 3078F PR MOST RECENT DIASTOLIC BLOOD PRESSURE < 80 MM HG: ICD-10-PCS | Mod: CPTII,S$GLB,, | Performed by: INTERNAL MEDICINE

## 2022-05-06 PROCEDURE — 3074F PR MOST RECENT SYSTOLIC BLOOD PRESSURE < 130 MM HG: ICD-10-PCS | Mod: CPTII,S$GLB,, | Performed by: INTERNAL MEDICINE

## 2022-05-06 PROCEDURE — 1126F AMNT PAIN NOTED NONE PRSNT: CPT | Mod: CPTII,S$GLB,, | Performed by: INTERNAL MEDICINE

## 2022-05-06 PROCEDURE — 1101F PT FALLS ASSESS-DOCD LE1/YR: CPT | Mod: CPTII,S$GLB,, | Performed by: INTERNAL MEDICINE

## 2022-05-06 PROCEDURE — 1101F PR PT FALLS ASSESS DOC 0-1 FALLS W/OUT INJ PAST YR: ICD-10-PCS | Mod: CPTII,S$GLB,, | Performed by: INTERNAL MEDICINE

## 2022-05-06 PROCEDURE — 3288F PR FALLS RISK ASSESSMENT DOCUMENTED: ICD-10-PCS | Mod: CPTII,S$GLB,, | Performed by: INTERNAL MEDICINE

## 2022-05-06 PROCEDURE — 99999 PR PBB SHADOW E&M-EST. PATIENT-LVL IV: CPT | Mod: PBBFAC,,, | Performed by: INTERNAL MEDICINE

## 2022-05-06 PROCEDURE — 3078F DIAST BP <80 MM HG: CPT | Mod: CPTII,S$GLB,, | Performed by: INTERNAL MEDICINE

## 2022-05-06 PROCEDURE — 1160F PR REVIEW ALL MEDS BY PRESCRIBER/CLIN PHARMACIST DOCUMENTED: ICD-10-PCS | Mod: CPTII,S$GLB,, | Performed by: INTERNAL MEDICINE

## 2022-05-06 PROCEDURE — 1126F PR PAIN SEVERITY QUANTIFIED, NO PAIN PRESENT: ICD-10-PCS | Mod: CPTII,S$GLB,, | Performed by: INTERNAL MEDICINE

## 2022-05-06 PROCEDURE — 3074F SYST BP LT 130 MM HG: CPT | Mod: CPTII,S$GLB,, | Performed by: INTERNAL MEDICINE

## 2022-05-06 PROCEDURE — 99214 PR OFFICE/OUTPT VISIT, EST, LEVL IV, 30-39 MIN: ICD-10-PCS | Mod: S$GLB,,, | Performed by: INTERNAL MEDICINE

## 2022-05-06 PROCEDURE — 99214 OFFICE O/P EST MOD 30 MIN: CPT | Mod: S$GLB,,, | Performed by: INTERNAL MEDICINE

## 2022-05-06 PROCEDURE — 1160F RVW MEDS BY RX/DR IN RCRD: CPT | Mod: CPTII,S$GLB,, | Performed by: INTERNAL MEDICINE

## 2022-05-06 PROCEDURE — 1159F MED LIST DOCD IN RCRD: CPT | Mod: CPTII,S$GLB,, | Performed by: INTERNAL MEDICINE

## 2022-05-06 RX ORDER — FLUCONAZOLE 200 MG/1
TABLET ORAL
COMMUNITY
Start: 2022-02-17 | End: 2022-05-06

## 2022-05-06 RX ORDER — CLINDAMYCIN PHOSPHATE 20 MG/G
CREAM VAGINAL
COMMUNITY
Start: 2022-02-18 | End: 2022-05-06

## 2022-05-06 RX ORDER — CLOPIDOGREL BISULFATE 75 MG/1
75 TABLET ORAL DAILY
Qty: 90 TABLET | Refills: 1 | Status: SHIPPED | OUTPATIENT
Start: 2022-05-06 | End: 2022-10-03

## 2022-05-06 RX ORDER — ATORVASTATIN CALCIUM 80 MG/1
80 TABLET, FILM COATED ORAL DAILY
Qty: 90 TABLET | Refills: 1 | Status: SHIPPED | OUTPATIENT
Start: 2022-05-06 | End: 2023-01-23

## 2022-05-06 NOTE — PROGRESS NOTES
[facility] Note    Chief Complaint      Chief Complaint   Patient presents with    Follow-up       History of Present Illness      Lori Pulido is a 82 y.o.  AAF with HTN, HLD, GERD, Choledocholithiasis, OA presents to fu chronic issues. Last visit -11/5/21.     Cerebrovascular disease - Pt was hospitalized -5/4/21 at [facility] for CVA and PFO. She had presented with inability to write and Rt arm weakness.  She saw Dr. Lewis on 5/18/21 to discuss possible closure of the PFO.  She denies any residual effects.  MRI - Brain - Tiny acute/subacute infarct Left parietal corona radiata tracking to the subcortical white matter and chronic microvascular changes. Carotid U/S - no sig stenosis.  Fu by Neuro, Dr. Maria.  Pt on Plavix in addition to her ASA. Pt reports being told she had a DVT.  U/S from  reports Superficial thrombophlebitis.      PFO -  Echo 5/28/21 - EF 63%.. patent foramen ovale present with right to left shunting indicated by saline contrast.  Mod ANTHONY, Mild/Mod TR, mil MS, PAP - 28 mmHg. Pt fu by Dr Lewis who rec PFO closure but pt declined. Pt opted to cont ASA and plavix. Fu Dr. Stevens in Jan. Has fu in July.      Hearing loss - Planning for hearing aids.      Hypercalcemia/ Hyperparathyroidism - Her Vit D was low at 21 so we tx her with Ergocalciferol 50,000 units once weekly x 12 wks.  She then started OTC Vit D 3 2000 units one daily and we repeated her Vit D level - 30.  Her iPTH was high at 173 and on repeat went up to 189. Her kidney function is stable and her calcium level is back to normal.  Parathyroid scintigraphy was normal.  Could be related to secondary hyperparathyroidism from her CKD - III but I d/w renal and they rec a referral to Endo. Fu by Dr. Hunt. Has fu in Jan. and repeat labs. Note -we recently stopped HCTZ due to Hypercalcemia  iPTH - 175; Vit D - 40 - 7/12/21. Ca - 11 -1/24/22     Osteopenia - DEXA Showed osteopenia. Rec Vit D3 2000 units/d  "OTC. Avoids calcium due to hypercalcemia. In addition, I rec weight-bearing exercises 5 times/wk x 30 minutes as tolerated.     Mild anemia - Her anemia is mild and remains stable. H/H - 12/40.  We will cont to monitor.     HTN - Pt controlled on Losartan 25 mg/d. We recently stopped  HCTZ 12.5 mg/d due to hypercalcemia. Fu by [place], Dr. Stevens. Cardiolite stress test was normal with normal LVEF.  She denies any edema.       HLD - Pt was prev on Crestor 5 mg QHS which was changed to Atorvastatin 80 mg QHS in hospital.  Controlled in Jan.      Vit B12 def- Pt on Vit B12 1000 mcg/d. MCV - 103. Vit B12 - 1439 up from prev B12 - 268 and folate 882.  Neither were deficient.  10/12/20.      Neuropathy - Pt is on Gabapentin 300 mg 1 po QHS.        Nicotine dependence in remission - Pt quit smoking 3 mos ago. 4-5 cig/d. She quit 6wks during lent but resumed.  "I just like doing it". Enc continued cessation.      GERD -  Protonix was stopped. Pt on Pepcid 40 mg/d and ursodiol TID.  Still an issue. Prilosec no help. Fu by Dr. Pappas.       Choledocholithiasis -had repeat ERCP/Sphincterotomy/pappilotomy with Dr. Garay.  No further pain since procedure. Note pt has had ERCP followed by cholecystectomy followed by recurrent ERCP for retained stones and required PICC line for sepsis afterwards. Fu by Dr. Pappas.  She fu with him 6 wks ago and was reassured. Will obtain note to review.     Liver and renal cysts - Seen on imaging at [facility]. Reassured.      Oa - Fu by Dr. Cuellar.  Pt s/p RT TKA on 10/28/19.  Stable.      CKD II/III - Stable. BUN/Cr - 15/0.8 in Jan.      Sciatica- Prev fu by Dr. Sni. Pt tried Diclofenac, Lyrica, Gabapentin, Hydrocodone, and Tylenol #3 in past - no help. She takes Tylenol prn. She just completed a short course of steroids. She has tried PTx in past.  She declines PTx.      MNG - Largest nodule - 1.6 cm - reassured by Dr. Kearney. She had a repeat U/S with Dr. Hunt at recent appt. In Mar.  Pt " "having trouble remembering and keeping track of her appts she had and what was done.  Has upcoming fu.      Urinary incontinence - No longer wears pads.  Kegels help. Doing well.      Memory Issues - RPR - NR.  TSH - wnl. B12 -back to nl on vit B12 suppl. She fu with Neuro and was reassured. She feels it is worsening and fu with Dr. Maria. She sometimes mixes up numbers when doing her bills. "It's like I'm reversing it". She has forgotten to put her bra on and noticed when she went to shower at the end of the day. ?NPH - incontinence, imbalance, and cognitive impairment/vascular dementia. Fu  with Dr. Maria. "He said you're doing fine". She feels this is worsening and reports an episode where she went to put her cup in the fridge rather than the cupboard.  Could consider Neuropsych referral in future if needed.        ?Carpal Tunnel - Pt is s/p EMG with Dr. Maria and reports it was normal.     RT Groin/RLQ pain - Pt reports neg. Romano with GI, Dr. Pappas. Intermittent. No exac factors or allev factors. No change in bowels or urinary Sx's.  Recent CT Mar - as below.      Lab review: 5/6/21 - Ha1c 4.9; TSH - 2.6; Alb - 4.4;  Calcium - 10.5;  HDL 50 LDL 77; H/H - 12.2/35.5     HCM - Flu - 11/5/21;   Td - 8/31/15;  PCV 13 - 3/28/17;  PVX 23 - 11/11/20;  Shingrix - 10/1/19 and 1/15/20; COVID - Vaccine - (Pfizer) #1 - 1/12/21; #2 - 2/2/21;  #3 - 10/6/21; # 4 ; PAP - no longer gets; MGM - 12/23/21 -repeat 1 yr;  DEXA - 12/29/20  +Osteopenia;  C-scope - 2/19/20-  polyps - repeat 5 yrs;  CRS - Dr. Morales;  GI - Dr. Pappas; Cards - Dr. Stevens; Ortho - Dr. Cuellar; Derm - Dr. Koenig; Neuro - Dr. Maria; Endo - Dr. Hunt     Patient Care Team:  Fannie Du MD as PCP - General (Internal Medicine)  Sandi Morales MD as Consulting Physician (Colon and Rectal Surgery)  Alonzo Stevens MD as Consulting Physician (Cardiology)  Virgil Pappas MD as Consulting Physician " (Gastroenterology)  Jay Cuellar III, MD as Consulting Physician (Orthopedic Surgery)  Tiffanie Olea MA as Care Coordinator  Linda Maria III, MD as Consulting Physician (Neurology)  Pat Hunt MD as Consulting Physician (Endocrinology)     Freedom Portillo MD - 03/02/2022    Formatting of this note might be different from the original.  CT ABDOMEN AND PELVIS WITH CONTRAST: 3/2/2022 10:21 AM CST    CLINICAL HISTORY: 82 years of age, Female, Epigastric pain R10.31 RLQ abdominal pain.    COMPARISON: CT abdomen/pelvis 2/21/2019.    PROCEDURE COMMENTS: CT of the abdomen and pelvis was performed following administration of IV contrast. Oral contrast was administered prior to the examination.    An individualized dose optimization technique, Automated Exposure Control, was utilized for the performed procedure.    FINDINGS:    Lower thorax: No pleural or pericardial effusion. Small paraesophageal hernia (type II). Multichamber cardiomegaly.    Liver and biliary tree: No suspicious lesion. Scattered hepatic cysts measuring up to 1.7 cm near the dome. Normal bile ducts.    Gallbladder: Surgically absent.    Spleen: Normal.    Pancreas: Normal.    Adrenal glands: Normal.    Kidneys and ureters: Anatomic variant fetal lobulations. A left inferior pole simple cyst measures 1.7 cm. A 0.9 cm right interpolar hypodensity too small to characterize statistically represents an additional cyst. Normal excretion and ureters.    Gastrointestinal tract: The appendix is surgically absent. Small type II paraesophageal hernia. No bowel obstruction. Pancolonic diverticulosis without evidence for active diverticulitis.    Peritoneal cavity: No free fluid.    Bladder: Normal.    Uterus and ovaries: Hysterectomy. The ovaries are physiologic in appearance by CT.    Vasculature: Atherosclerosis without aneurysm.    Lymph nodes: No lymphadenopathy.    Abdominal wall: Small fat-containing umbilical  hernia.    Musculoskeletal: Multilevel degenerative changes of the spine, worst and moderate at L1-L2 at the apex of thoracolumbar dextrocurvature. Diffuse osseous demineralization.    IMPRESSION:     1. The appendix is surgically absent (confirmed by chart review).  2. No acute process in the abdomen or pelvis.  3. Small paraesophageal hernia.  4. Pancolonic diverticulosis.          Electronically Signed By: Freedom Portillo MD 3/2/2022 12:21 PM CST         Health Maintenance:  Immunization History   Administered Date(s) Administered    COVID-19, MRNA, LN-S, PF (Pfizer) (Purple Cap) 2021, 2021    Influenza (FLUAD) - Quadrivalent - Adjuvanted - PF *Preferred* (65+) 2020, 2021    Influenza - High Dose - PF (65 years and older) 10/15/2014, 2015, 2018, 10/01/2019    Pneumococcal Conjugate - 13 Valent 2017    Pneumococcal Polysaccharide - 23 Valent 2020    Tdap 2015    Zoster Recombinant 10/01/2019, 01/15/2020      Health Maintenance   Topic Date Due    Mammogram  2022    DEXA Scan  2023    Colonoscopy  2025    TETANUS VACCINE  2025    Lipid Panel  2027        Past Medical History:  Past Medical History:   Diagnosis Date    Allergic rhinitis     Choledocholithiasis     DVT (deep venous thrombosis)     after TKA    GERD (gastroesophageal reflux disease)     Hemorrhoids     Hyperlipidemia     Hypertension     Neuropathy     Nicotine dependence     Stroke     Stroke 2020       Past Surgical History:   has a past surgical history that includes  section; Appendectomy; Cataract extraction bilateral w/ anterior vitrectomy; Carpal tunnel release; Breast surgery; Hysterectomy; Laparoscopic cholecystectomy with cholangiography (2019); and Total knee arthroplasty.    Family History:  family history includes Aortic dissection in her sister; Cancer (age of onset: 89) in her sister; Cerebral aneurysm in her  other and other; Coronary artery disease in her brother; Dementia in her sister; Heart disease in her father; Other in her sister; Stroke (age of onset: 68) in her brother; Stroke (age of onset: 80) in her mother.     Social History:  Social History     Tobacco Use    Smoking status: Former Smoker     Years: 20.00     Types: Cigarettes    Smokeless tobacco: Never Used    Tobacco comment: 1 pack every 2-3 wks   Substance Use Topics    Alcohol use: No    Drug use: Never       Review of Systems   Constitutional: Negative for chills, diaphoresis and fever.   HENT: Negative for nasal congestion and sore throat.    Respiratory: Positive for shortness of breath. Negative for cough.         No worse than 6 mos ago.    Cardiovascular: Negative for chest pain.   Gastrointestinal: Positive for abdominal pain.   Endocrine: Positive for heat intolerance.   Genitourinary: Negative for dysuria and frequency.   Musculoskeletal: Positive for arthralgias. Negative for myalgias.        Hands and low back   Neurological: Positive for memory loss. Negative for dizziness and headaches.   Psychiatric/Behavioral: Negative for dysphoric mood. The patient is not nervous/anxious.         It bothers her when she and her daughter don't get along at times.  She lives with her daughter.         Medications:    Current Outpatient Medications:     acetaminophen (TYLENOL) 325 MG tablet, Take 325 mg by mouth every 6 (six) hours as needed for Pain., Disp: , Rfl:     aspirin (ECOTRIN) 81 MG EC tablet, Take 81 mg by mouth once daily., Disp: , Rfl:     cyanocobalamin (VITAMIN B-12) 1000 MCG tablet, Take 100 mcg by mouth once daily., Disp: , Rfl:     ergocalciferol, vitamin D2, (VITAMIN D ORAL), Take by mouth., Disp: , Rfl:     famotidine (PEPCID) 40 MG tablet, Take 40 mg by mouth once daily at 6am., Disp: , Rfl:     fluocinonide (LIDEX) 0.05 % external solution, Apply to affected area scalp once to twice daily as needed for itching (pruritus),  "Disp: 60 mL, Rfl: 3    fluticasone propionate (CUTIVATE) 0.05 % cream, AAA bid, Disp: 60 g, Rfl: 3    gabapentin (NEURONTIN) 300 MG capsule, TAKE 1-2 CAPSULES BY MOUTH EVERY NIGHT AT BEDTIME, Disp: 180 capsule, Rfl: 0    ketoconazole (NIZORAL) 2 % shampoo, Wash hair with medicated shampoo at least 2x/week - let sit on scalp at least 5 minutes prior to rinsing, Disp: 120 mL, Rfl: 5    loratadine (CLARITIN) 10 mg tablet, Take by mouth., Disp: , Rfl:     losartan (COZAAR) 50 MG tablet, TAKE 0.5 TABLETS (25 MG TOTAL) BY MOUTH ONCE DAILY., Disp: 45 tablet, Rfl: 3    TAC 0.1%-ciclopirox-MOM, Apply to affected area twice daily after cool blow dry, Disp: 60 g, Rfl: 4    ursodioL (ACTIGALL) 250 mg Tab, TAKE TABLET BY MOUTH THREE TIMES A DAY, Disp: , Rfl:     atorvastatin (LIPITOR) 80 MG tablet, Take 1 tablet (80 mg total) by mouth once daily., Disp: 90 tablet, Rfl: 1    clopidogreL (PLAVIX) 75 mg tablet, Take 1 tablet (75 mg total) by mouth once daily., Disp: 90 tablet, Rfl: 1    mometasone (ELOCON) 0.1 % solution, Apply topically once daily. To scalp, Disp: 60 mL, Rfl: 5    triamcinolone acetonide 0.1% (KENALOG) 0.1 % ointment, , Disp: , Rfl:      Allergies:  Review of patient's allergies indicates:   Allergen Reactions    Morphine      Itching/Hives    Penicillins        Physical Exam      Vital Signs  Temp: 98.7 °F (37.1 °C)  Temp src: Oral  Pulse: 78  SpO2: 97 %  BP: 122/72  Pain Score: 0-No pain  Height and Weight  Height: 5' 3" (160 cm)  Weight: 74.5 kg (164 lb 3.9 oz)  BSA (Calculated - sq m): 1.82 sq meters  BMI (Calculated): 29.1  Weight in (lb) to have BMI = 25: 140.8             Physical Exam  Vitals reviewed.   Constitutional:       General: She is not in acute distress.     Appearance: Normal appearance. She is not ill-appearing, toxic-appearing or diaphoretic.   HENT:      Head: Normocephalic and atraumatic.      Right Ear: Tympanic membrane normal.      Left Ear: Tympanic membrane normal.   Eyes: "      Extraocular Movements: Extraocular movements intact.      Conjunctiva/sclera: Conjunctivae normal.      Pupils: Pupils are equal, round, and reactive to light.   Neck:      Vascular: No carotid bruit.   Cardiovascular:      Rate and Rhythm: Normal rate and regular rhythm.      Pulses: Normal pulses.      Heart sounds: Normal heart sounds. No murmur heard.  Pulmonary:      Effort: Pulmonary effort is normal. No respiratory distress.      Breath sounds: Normal breath sounds. No wheezing.   Abdominal:      General: Bowel sounds are normal. There is no distension.      Palpations: Abdomen is soft.      Tenderness: There is no abdominal tenderness. There is no guarding.   Musculoskeletal:      Comments: Neg straight leg raise violette; NTTP over spine or PSIS. Strength 5/5 BLE   Skin:     Comments: SK to back, abd, groin   Neurological:      General: No focal deficit present.      Mental Status: She is alert and oriented to person, place, and time.   Psychiatric:         Mood and Affect: Mood normal.         Behavior: Behavior normal.          Laboratory:  CBC:  Recent Labs   Lab 07/29/20  0731 06/03/21  0712 01/24/22  0705   WBC 6.22 6.40 6.67   RBC 3.44 L 3.51 L 3.79 L   Hemoglobin 11.0 L 11.0 L 12.1   Hematocrit 35.9 L 36.1 L 40.1   Platelets 201 193 195    H 103 H 106 H   MCH 32.0 H 31.3 H 31.9 H   MCHC 30.6 L 30.5 L 30.2 L       CMP:  Recent Labs   Lab 06/19/20  0859 07/17/20  0710 07/12/21  1142 01/24/22  0705   Glucose 94   < > 95 85   Calcium 10.8 H   < > 11.1 H 11.0 H   Albumin 4.0  4.0  --  4.1 3.9   Total Protein 7.7  7.7  --  7.7 7.4   Sodium 140   < > 138 141   Potassium 4.2   < > 4.1 4.2   CO2 27   < > 26 22 L   Chloride 105   < > 102 108   BUN 18   < > 15 15   Creatinine 1.0   < > 1.1 0.8   Alkaline Phosphatase 107  107  --  113 107   ALT 10  9 L  --  14 13   AST 16  15  --  21 16   Total Bilirubin 0.6  0.6  --  0.7 0.6    < > = values in this interval not displayed.       LIPIDS:  Recent Labs    Lab 06/19/20  0859 06/03/21  0712 01/24/22  0705   TSH  --   --  1.988   HDL 48 44 54   Cholesterol 166 116 L 143   Triglycerides 185 H 100 118   LDL Cholesterol 81.0 52.0 L 65.4   HDL/Cholesterol Ratio 28.9 37.9 37.8   Non-HDL Cholesterol 118 72 89   Total Cholesterol/HDL Ratio 3.5 2.6 2.6       TSH:  Recent Labs   Lab 01/24/22  0705   TSH 1.988       Other:   Recent Labs   Lab 10/12/20  0920 11/09/20  0813 07/12/21  1142   Vit D, 25-Hydroxy  --  30 40   Vitamin B-12 1439 H  --   --            Assessment/Plan     Lori Pulido is a 82 y.o.female with:    Essential hypertension  - Controlled.  Cont current.     Pure hypercholesterolemia  -     atorvastatin (LIPITOR) 80 MG tablet; Take 1 tablet (80 mg total) by mouth once daily.  Dispense: 90 tablet; Refill: 1  - Controlled.  Cont current.     Cerebrovascular disease  -     clopidogreL (PLAVIX) 75 mg tablet; Take 1 tablet (75 mg total) by mouth once daily.  Dispense: 90 tablet; Refill: 1  - Stable.  Cont current regimen.    Hyperparathyroidism  Fu by Endo.    Hypercalcemia  - Avoid Calcium suppl. Fu by Endo.     PFO (patent foramen ovale)  - Stable.  Cont current regimen.    Memory impairment  - Fu by Neuro. Will obtain note for review.  Could consider Neuropsych referral in future if needed.     Osteopenia, unspecified location  - Stable.  Cont current regimen.    Idiopathic peripheral neuropathy  - Stable.  Cont current regimen.    Seborrheic keratoses  -     Ambulatory referral/consult to Dermatology; Future; Expected date: 05/13/2022    Choledocholithiasis  - Stable.  Cont current regimen.    Cigarette nicotine dependence without complication   - Pt not interested in quitting.     Chronic conditions status updated as per HPI.  Other than changes above, cont current medications and maintain follow up with specialists.  Follow up in about 6 months (around 11/6/2022) for fu chronic issues or sooner if needed.      Fannie Du MD  [facility]

## 2022-05-17 ENCOUNTER — OFFICE VISIT (OUTPATIENT)
Dept: HOME HEALTH SERVICES | Facility: CLINIC | Age: 82
End: 2022-05-17
Payer: MEDICARE

## 2022-05-17 VITALS
DIASTOLIC BLOOD PRESSURE: 88 MMHG | HEART RATE: 78 BPM | BODY MASS INDEX: 29.06 KG/M2 | TEMPERATURE: 97 F | HEIGHT: 63 IN | WEIGHT: 164 LBS | SYSTOLIC BLOOD PRESSURE: 144 MMHG

## 2022-05-17 DIAGNOSIS — E78.00 PURE HYPERCHOLESTEROLEMIA: ICD-10-CM

## 2022-05-17 DIAGNOSIS — E21.3 HYPERPARATHYROIDISM: ICD-10-CM

## 2022-05-17 DIAGNOSIS — I27.20 PULMONARY HYPERTENSION: ICD-10-CM

## 2022-05-17 DIAGNOSIS — G60.9 IDIOPATHIC PERIPHERAL NEUROPATHY: ICD-10-CM

## 2022-05-17 DIAGNOSIS — F17.210 CIGARETTE NICOTINE DEPENDENCE WITHOUT COMPLICATION: ICD-10-CM

## 2022-05-17 DIAGNOSIS — H35.3220 EXUDATIVE AGE-RELATED MACULAR DEGENERATION OF LEFT EYE, UNSPECIFIED STAGE: ICD-10-CM

## 2022-05-17 DIAGNOSIS — Z00.00 ENCOUNTER FOR PREVENTIVE HEALTH EXAMINATION: Primary | ICD-10-CM

## 2022-05-17 DIAGNOSIS — Z86.73 HISTORY OF CVA (CEREBROVASCULAR ACCIDENT): ICD-10-CM

## 2022-05-17 DIAGNOSIS — I10 ESSENTIAL HYPERTENSION: ICD-10-CM

## 2022-05-17 DIAGNOSIS — M85.80 OSTEOPENIA, UNSPECIFIED LOCATION: ICD-10-CM

## 2022-05-17 PROCEDURE — 3079F PR MOST RECENT DIASTOLIC BLOOD PRESSURE 80-89 MM HG: ICD-10-PCS | Mod: CPTII,S$GLB,, | Performed by: NURSE PRACTITIONER

## 2022-05-17 PROCEDURE — 99499 RISK ADDL DX/OHS AUDIT: ICD-10-PCS | Mod: S$GLB,,, | Performed by: NURSE PRACTITIONER

## 2022-05-17 PROCEDURE — 3077F PR MOST RECENT SYSTOLIC BLOOD PRESSURE >= 140 MM HG: ICD-10-PCS | Mod: CPTII,S$GLB,, | Performed by: NURSE PRACTITIONER

## 2022-05-17 PROCEDURE — 3288F FALL RISK ASSESSMENT DOCD: CPT | Mod: CPTII,S$GLB,, | Performed by: NURSE PRACTITIONER

## 2022-05-17 PROCEDURE — 1125F PR PAIN SEVERITY QUANTIFIED, PAIN PRESENT: ICD-10-PCS | Mod: CPTII,S$GLB,, | Performed by: NURSE PRACTITIONER

## 2022-05-17 PROCEDURE — G0439 PPPS, SUBSEQ VISIT: HCPCS | Mod: S$GLB,,, | Performed by: NURSE PRACTITIONER

## 2022-05-17 PROCEDURE — 1159F MED LIST DOCD IN RCRD: CPT | Mod: CPTII,S$GLB,, | Performed by: NURSE PRACTITIONER

## 2022-05-17 PROCEDURE — 1101F PR PT FALLS ASSESS DOC 0-1 FALLS W/OUT INJ PAST YR: ICD-10-PCS | Mod: CPTII,S$GLB,, | Performed by: NURSE PRACTITIONER

## 2022-05-17 PROCEDURE — 1160F RVW MEDS BY RX/DR IN RCRD: CPT | Mod: CPTII,S$GLB,, | Performed by: NURSE PRACTITIONER

## 2022-05-17 PROCEDURE — 3079F DIAST BP 80-89 MM HG: CPT | Mod: CPTII,S$GLB,, | Performed by: NURSE PRACTITIONER

## 2022-05-17 PROCEDURE — 1125F AMNT PAIN NOTED PAIN PRSNT: CPT | Mod: CPTII,S$GLB,, | Performed by: NURSE PRACTITIONER

## 2022-05-17 PROCEDURE — 1170F PR FUNCTIONAL STATUS ASSESSED: ICD-10-PCS | Mod: CPTII,S$GLB,, | Performed by: NURSE PRACTITIONER

## 2022-05-17 PROCEDURE — 3077F SYST BP >= 140 MM HG: CPT | Mod: CPTII,S$GLB,, | Performed by: NURSE PRACTITIONER

## 2022-05-17 PROCEDURE — 1101F PT FALLS ASSESS-DOCD LE1/YR: CPT | Mod: CPTII,S$GLB,, | Performed by: NURSE PRACTITIONER

## 2022-05-17 PROCEDURE — 1170F FXNL STATUS ASSESSED: CPT | Mod: CPTII,S$GLB,, | Performed by: NURSE PRACTITIONER

## 2022-05-17 PROCEDURE — G0439 PR MEDICARE ANNUAL WELLNESS SUBSEQUENT VISIT: ICD-10-PCS | Mod: S$GLB,,, | Performed by: NURSE PRACTITIONER

## 2022-05-17 PROCEDURE — 1160F PR REVIEW ALL MEDS BY PRESCRIBER/CLIN PHARMACIST DOCUMENTED: ICD-10-PCS | Mod: CPTII,S$GLB,, | Performed by: NURSE PRACTITIONER

## 2022-05-17 PROCEDURE — 3288F PR FALLS RISK ASSESSMENT DOCUMENTED: ICD-10-PCS | Mod: CPTII,S$GLB,, | Performed by: NURSE PRACTITIONER

## 2022-05-17 PROCEDURE — 1159F PR MEDICATION LIST DOCUMENTED IN MEDICAL RECORD: ICD-10-PCS | Mod: CPTII,S$GLB,, | Performed by: NURSE PRACTITIONER

## 2022-05-17 PROCEDURE — 99499 UNLISTED E&M SERVICE: CPT | Mod: S$GLB,,, | Performed by: NURSE PRACTITIONER

## 2022-05-18 PROBLEM — H35.3220 EXUDATIVE AGE-RELATED MACULAR DEGENERATION OF LEFT EYE: Status: ACTIVE | Noted: 2022-05-18

## 2022-05-18 PROBLEM — Z86.73 HISTORY OF CVA (CEREBROVASCULAR ACCIDENT): Status: ACTIVE | Noted: 2022-05-18

## 2022-05-18 NOTE — PATIENT INSTRUCTIONS
Counseling and Referral of Other Preventative  (Italic type indicates deductible and co-insurance are waived)    Patient Name: Lori Pulido  Today's Date: 5/17/2022    Health Maintenance       Date Due Completion Date    COVID-19 Vaccine (4 - Booster for Pfizer series) 02/06/2022 10/6/2021    Mammogram 12/23/2022 12/23/2021    Override on 12/24/2019: Done    DEXA Scan 12/29/2023 12/29/2020    Override on 9/27/2017: Done    Colonoscopy 02/19/2025 2/19/2020    TETANUS VACCINE 08/31/2025 8/31/2015    Lipid Panel 01/24/2027 1/24/2022        No orders of the defined types were placed in this encounter.    The following information is provided to all patients.  This information is to help you find resources for any of the problems found today that may be affecting your health:                Living healthy guide: www.[facility].[place].gov      Understanding Diabetes: www.diabetes.org      Eating healthy: www.cdc.gov/healthyweight      [facility] home safety checklist: www.cdc.gov/steadi/patient.html      Agency on Aging: www.goea.[place].[facility]      Alcoholics anonymous (AA): www.aa.org      Physical Activity: www.leslie.nih.gov/xs1qtqh      Tobacco use: www.quitwithusla.org

## 2022-05-18 NOTE — PROGRESS NOTES
"  Lori Pulido presented for a  Medicare AWV and comprehensive Health Risk Assessment today. The following components were reviewed and updated:    · Medical history  · Family History  · Social history  · Allergies and Current Medications  · Health Risk Assessment  · Health Maintenance  · Care Team         ** See Completed Assessments for Annual Wellness Visit within the encounter summary.**         The following assessments were completed:  · Living Situation  · CAGE  · Depression Screening  · Timed Get Up and Go  · Whisper Test  · Cognitive Function Screening  ·   ·   ·   · Nutrition Screening  · ADL Screening  · PAQ Screening        Vitals:    05/17/22 1129   BP: (!) 144/88   Pulse: 78   Temp: 96.8 °F (36 °C)   Weight: 74.4 kg (164 lb)   Height: 5' 3" (1.6 m)     Body mass index is 29.05 kg/m².  Physical Exam  Constitutional:       Appearance: Normal appearance.   HENT:      Head: Normocephalic.      Nose: Nose normal.      Mouth/Throat:      Mouth: Mucous membranes are moist.   Eyes:      Extraocular Movements: Extraocular movements intact.   Cardiovascular:      Rate and Rhythm: Normal rate and regular rhythm.      Heart sounds: Normal heart sounds.   Pulmonary:      Effort: Pulmonary effort is normal. No respiratory distress.      Breath sounds: Normal breath sounds.   Abdominal:      General: Bowel sounds are normal. There is no distension.      Palpations: Abdomen is soft.   Musculoskeletal:         General: No swelling. Normal range of motion.      Cervical back: Normal range of motion.   Skin:     General: Skin is warm and dry.   Neurological:      General: No focal deficit present.      Mental Status: She is alert and oriented to person, place, and time.   Psychiatric:         Mood and Affect: Mood normal.         Behavior: Behavior normal.               Diagnoses and health risks identified today and associated recommendations/orders:    1. Encounter for preventive health examination  Assessments " completed. Preventive measures and health maintenance reviewed with patient.    2. Idiopathic peripheral neuropathy  Stable, followed by PCP.    3. Pulmonary hypertension  Stable, followed by PCP.    4. Exudative age-related macular degeneration of left eye, unspecified stage  Stable, followed by Optometry.    5. Hyperparathyroidism  Stable, followed by PCP.    6. Essential hypertension  Stable, followed by PCP.    7. Pure hypercholesterolemia  Stable, followed by PCP.    8. Osteopenia, unspecified location  Stable, followed by PCP.    9. Cigarette nicotine dependence without complication  Stable, followed by PCP.    10. History of CVA (cerebrovascular accident)  Stable, followed by PCP.    Provided Lori with a 5-10 year written screening schedule and personal prevention plan. Recommendations were developed using the USPSTF age appropriate recommendations. Education, counseling, and referrals were provided as needed. After Visit Summary printed and given to patient which includes a list of additional screenings\tests needed.    Follow up in about 1 year (around 5/17/2023) for your next annual wellness visit.    Laura Frost, NP  I offered to discuss advanced care planning, including how to pick a person who would make decisions for you if you were unable to make them for yourself, called a health care power of , and what kind of decisions you might make such as use of life sustaining treatments such as ventilators and tube feeding when faced with a life limiting illness recorded on a living will that they will need to know. (How you want to be cared for as you near the end of your natural life)     X Patient is interested in learning more about how to make advanced directives.  I provided them paperwork and offered to discuss this with them.

## 2022-05-24 ENCOUNTER — PATIENT MESSAGE (OUTPATIENT)
Dept: SMOKING CESSATION | Facility: CLINIC | Age: 82
End: 2022-05-24
Payer: MEDICARE

## 2022-07-06 ENCOUNTER — TELEPHONE (OUTPATIENT)
Dept: DERMATOLOGY | Facility: CLINIC | Age: 82
End: 2022-07-06
Payer: MEDICARE

## 2022-07-06 NOTE — TELEPHONE ENCOUNTER
----- Message from Krissy Michel RN sent at 7/1/2022  5:05 PM CDT -----  Contact: QUYNH QUINTANILLA [[id number]] [phone]    ----- Message -----  From: Sherry Michaels LPN  Sent: 7/1/2022   7:44 AM CDT  To: [facility] Staff      ----- Message -----  From: Gita Lee LPN  Sent: 6/30/2022   3:39 PM CDT  To: Sherry Michaels LPN      ----- Message -----  From: Genesis Melo  Sent: 6/30/2022   2:44 PM CDT  To: [facility] Staff    Type:  Sooner Appointment Request    Caller is requesting a sooner appointment.  Caller declined first available appointment listed below.  Caller will not accept being placed on the waitlist and is requesting a message be sent to doctor.    Name of Caller: QUYNH QUINTANILLA [[id number]]  When is the first available appointment? 10/24/22  Reason for Visit: Rash followup (missed 6/29/22 visit)  Would the patient rather a call back or a response via [facility]? Phone call   Best Call Back Number: [phone]  Additional Information:

## 2022-08-01 ENCOUNTER — TELEPHONE (OUTPATIENT)
Dept: PRIMARY CARE CLINIC | Facility: CLINIC | Age: 82
End: 2022-08-01
Payer: MEDICARE

## 2022-08-01 DIAGNOSIS — R60.0 LOCALIZED EDEMA: ICD-10-CM

## 2022-08-01 DIAGNOSIS — M79.89 RIGHT LEG SWELLING: Primary | ICD-10-CM

## 2022-08-01 NOTE — TELEPHONE ENCOUNTER
----- Message from Sangeetha Gar sent at 8/1/2022  2:42 PM CDT -----  Contact: Pt [phone]  Patient states right leg and ankle are swollen, leg feels tight and slight headache. Patient states she is going to the Cardiologist on 8/9 please advise if she should see you before or after.  Please call and advise.    Thank you and have a great day.

## 2022-08-01 NOTE — TELEPHONE ENCOUNTER
lov 5/6/22  Patient states right leg and ankle are swollen, leg feels tight for about 1 week. No tenderness,warmth or pink/red to touch. Skin is really smooth. Pt states not swollen very much. She is more concerned with the tightness than swelling. No cp or sob. Pt has an appt with her cardiology next week. She is going to contact his office to see about getting in sooner. She is unable to send us a pic.     For the last 3 days been having a ha,runny nose,cough. Negative at home covid test.  She is taking clartin and otc Robitussin syrup

## 2022-08-01 NOTE — TELEPHONE ENCOUNTER
Viry.  I won't know for sure about Jury duty until tomorrow. Block it for her just in case.  I put in an order for an U/S to r/o a DVT    Dr. NUNEZ

## 2022-08-01 NOTE — TELEPHONE ENCOUNTER
Pt does not want to go to [facility] or [facility]. [facility] will go to [facility]. We will see about getting her sooner than 8/8/ which is showing as the first available. I explained that we are r/o a blood clot. Pt states she had one in the past and it felt nothing like what she is experiencing

## 2022-08-02 ENCOUNTER — HOSPITAL ENCOUNTER (OUTPATIENT)
Dept: RADIOLOGY | Facility: HOSPITAL | Age: 82
Discharge: HOME | End: 2022-08-02
Attending: INTERNAL MEDICINE
Payer: MEDICARE

## 2022-08-02 DIAGNOSIS — R60.0 LOCALIZED EDEMA: ICD-10-CM

## 2022-08-02 DIAGNOSIS — M79.89 RIGHT LEG SWELLING: ICD-10-CM

## 2022-08-02 PROCEDURE — 93971 US LOWER EXTREMITY VEINS RIGHT: ICD-10-PCS | Mod: 26,RT,, | Performed by: RADIOLOGY

## 2022-08-02 PROCEDURE — 93971 EXTREMITY STUDY: CPT | Mod: 26,RT,, | Performed by: RADIOLOGY

## 2022-08-02 PROCEDURE — 93971 EXTREMITY STUDY: CPT | Mod: TC,RT

## 2022-08-02 NOTE — TELEPHONE ENCOUNTER
We were able to get pt squeezed in today at 11am. I left vm informing pt and requesting that she contact the office back

## 2022-08-02 NOTE — PROGRESS NOTES
I sent pt a my chart message -  Good news! I reviewed your Right leg ultrasound.  There was no evidence of a DVT/Blood clot. No further recommendations at this time.    Dr. NUNEZ

## 2022-08-09 ENCOUNTER — OFFICE VISIT (OUTPATIENT)
Dept: CARDIOLOGY | Facility: CLINIC | Age: 82
End: 2022-08-09
Payer: MEDICARE

## 2022-08-09 VITALS
WEIGHT: 163 LBS | BODY MASS INDEX: 28.88 KG/M2 | SYSTOLIC BLOOD PRESSURE: 132 MMHG | OXYGEN SATURATION: 98 % | DIASTOLIC BLOOD PRESSURE: 79 MMHG | HEART RATE: 75 BPM | HEIGHT: 63 IN

## 2022-08-09 DIAGNOSIS — E78.2 MIXED HYPERLIPIDEMIA: ICD-10-CM

## 2022-08-09 DIAGNOSIS — I49.1 PREMATURE ATRIAL CONTRACTIONS: ICD-10-CM

## 2022-08-09 DIAGNOSIS — E21.3 HYPERPARATHYROIDISM: ICD-10-CM

## 2022-08-09 DIAGNOSIS — G60.9 IDIOPATHIC PERIPHERAL NEUROPATHY: ICD-10-CM

## 2022-08-09 DIAGNOSIS — R00.2 PALPITATIONS: ICD-10-CM

## 2022-08-09 DIAGNOSIS — Z86.718 H/O DEEP VENOUS THROMBOSIS: ICD-10-CM

## 2022-08-09 DIAGNOSIS — Z86.73 HISTORY OF CVA (CEREBROVASCULAR ACCIDENT): ICD-10-CM

## 2022-08-09 DIAGNOSIS — E78.00 PURE HYPERCHOLESTEROLEMIA: ICD-10-CM

## 2022-08-09 DIAGNOSIS — Q21.12 PFO (PATENT FORAMEN OVALE): ICD-10-CM

## 2022-08-09 DIAGNOSIS — R06.09 DYSPNEA ON EXERTION: Primary | ICD-10-CM

## 2022-08-09 DIAGNOSIS — R55 VASOVAGAL SYNCOPE: ICD-10-CM

## 2022-08-09 DIAGNOSIS — I10 ESSENTIAL HYPERTENSION: ICD-10-CM

## 2022-08-09 DIAGNOSIS — I07.1 TRICUSPID VALVE INSUFFICIENCY, UNSPECIFIED ETIOLOGY: ICD-10-CM

## 2022-08-09 PROCEDURE — 1101F PT FALLS ASSESS-DOCD LE1/YR: CPT | Mod: CPTII,S$GLB,, | Performed by: INTERNAL MEDICINE

## 2022-08-09 PROCEDURE — 3075F PR MOST RECENT SYSTOLIC BLOOD PRESS GE 130-139MM HG: ICD-10-PCS | Mod: CPTII,S$GLB,, | Performed by: INTERNAL MEDICINE

## 2022-08-09 PROCEDURE — 3075F SYST BP GE 130 - 139MM HG: CPT | Mod: CPTII,S$GLB,, | Performed by: INTERNAL MEDICINE

## 2022-08-09 PROCEDURE — 3288F FALL RISK ASSESSMENT DOCD: CPT | Mod: CPTII,S$GLB,, | Performed by: INTERNAL MEDICINE

## 2022-08-09 PROCEDURE — 3288F PR FALLS RISK ASSESSMENT DOCUMENTED: ICD-10-PCS | Mod: CPTII,S$GLB,, | Performed by: INTERNAL MEDICINE

## 2022-08-09 PROCEDURE — 1125F PR PAIN SEVERITY QUANTIFIED, PAIN PRESENT: ICD-10-PCS | Mod: CPTII,S$GLB,, | Performed by: INTERNAL MEDICINE

## 2022-08-09 PROCEDURE — 1159F MED LIST DOCD IN RCRD: CPT | Mod: CPTII,S$GLB,, | Performed by: INTERNAL MEDICINE

## 2022-08-09 PROCEDURE — 99214 PR OFFICE/OUTPT VISIT, EST, LEVL IV, 30-39 MIN: ICD-10-PCS | Mod: 25,S$GLB,, | Performed by: INTERNAL MEDICINE

## 2022-08-09 PROCEDURE — 93000 EKG 12-LEAD: ICD-10-PCS | Mod: S$GLB,,, | Performed by: INTERNAL MEDICINE

## 2022-08-09 PROCEDURE — 3078F PR MOST RECENT DIASTOLIC BLOOD PRESSURE < 80 MM HG: ICD-10-PCS | Mod: CPTII,S$GLB,, | Performed by: INTERNAL MEDICINE

## 2022-08-09 PROCEDURE — 99999 PR PBB SHADOW E&M-EST. PATIENT-LVL III: CPT | Mod: PBBFAC,,, | Performed by: INTERNAL MEDICINE

## 2022-08-09 PROCEDURE — 3078F DIAST BP <80 MM HG: CPT | Mod: CPTII,S$GLB,, | Performed by: INTERNAL MEDICINE

## 2022-08-09 PROCEDURE — 99214 OFFICE O/P EST MOD 30 MIN: CPT | Mod: 25,S$GLB,, | Performed by: INTERNAL MEDICINE

## 2022-08-09 PROCEDURE — 1101F PR PT FALLS ASSESS DOC 0-1 FALLS W/OUT INJ PAST YR: ICD-10-PCS | Mod: CPTII,S$GLB,, | Performed by: INTERNAL MEDICINE

## 2022-08-09 PROCEDURE — 1159F PR MEDICATION LIST DOCUMENTED IN MEDICAL RECORD: ICD-10-PCS | Mod: CPTII,S$GLB,, | Performed by: INTERNAL MEDICINE

## 2022-08-09 PROCEDURE — 1125F AMNT PAIN NOTED PAIN PRSNT: CPT | Mod: CPTII,S$GLB,, | Performed by: INTERNAL MEDICINE

## 2022-08-09 PROCEDURE — 93000 ELECTROCARDIOGRAM COMPLETE: CPT | Mod: S$GLB,,, | Performed by: INTERNAL MEDICINE

## 2022-08-09 PROCEDURE — 99999 PR PBB SHADOW E&M-EST. PATIENT-LVL III: ICD-10-PCS | Mod: PBBFAC,,, | Performed by: INTERNAL MEDICINE

## 2022-08-09 NOTE — PROGRESS NOTES
Subjective:      Patient ID: Lori Pulido is a 82 y.o. female.    Chief Complaint: Follow-up    HPI:  Mostly limited by low back pain when walking with pain in both buttocks and posterior thighs.    Right leg feels tight    Thinking about proceeding with MRI of back already ordered.    Review of Systems   Cardiovascular: Positive for dyspnea on exertion (mild, chronic) and leg swelling. Negative for chest pain, claudication, irregular heartbeat, near-syncope, orthopnea, palpitations and syncope.      Pt feels real hot when arising to go to the bathroom.    Occasionally breaks out in a sweat.    No fever.  Past Medical History:   Diagnosis Date    Allergic rhinitis     Choledocholithiasis     DVT (deep venous thrombosis)     after TKA    GERD (gastroesophageal reflux disease)     Hemorrhoids     Hyperlipidemia     Hypertension     Neuropathy     Nicotine dependence     Stroke     Stroke 2020        Past Surgical History:   Procedure Laterality Date    APPENDECTOMY      BREAST SURGERY      lumpectomy    CARPAL TUNNEL RELEASE      CATARACT EXTRACTION BILATERAL W/ ANTERIOR VITRECTOMY       SECTION      HYSTERECTOMY      partial    LAPAROSCOPIC CHOLECYSTECTOMY WITH CHOLANGIOGRAPHY  2019    TOTAL KNEE ARTHROPLASTY      L knee       Family History   Problem Relation Age of Onset    Stroke Mother 80    Heart disease Father     Cancer Sister 89        breast cancer    Dementia Sister     Stroke Brother 68    Coronary artery disease Brother         s/p CABG    Cerebral aneurysm Other     Cerebral aneurysm Other        Social History     Socioeconomic History    Marital status:    Tobacco Use    Smoking status: Current Some Day Smoker     Years: 20.00     Types: Cigarettes    Smokeless tobacco: Never Used    Tobacco comment: 5-6 cigarettes a day   Substance and Sexual Activity    Alcohol use: No    Drug use: Never    Sexual activity: Not Currently     Social  Determinants of Health     Financial Resource Strain: Low Risk     Difficulty of Paying Living Expenses: Not hard at all   Food Insecurity: No Food Insecurity    Worried About Running Out of Food in the Last Year: Never true    Ran Out of Food in the Last Year: Never true   Transportation Needs: No Transportation Needs    Lack of Transportation (Medical): No    Lack of Transportation (Non-Medical): No   Physical Activity: Insufficiently Active    Days of Exercise per Week: 5 days    Minutes of Exercise per Session: 10 min   Stress: No Stress Concern Present    Feeling of Stress : Only a little   Social Connections: Moderately Isolated    Frequency of Communication with Friends and Family: More than three times a week    Frequency of Social Gatherings with Friends and Family: More than three times a week    Attends Cheondoism Services: More than 4 times per year    Active Member of Clubs or Organizations: No    Attends Club or Organization Meetings: Never    Marital Status:    Housing Stability: Low Risk     Unable to Pay for Housing in the Last Year: No    Number of Places Lived in the Last Year: 1    Unstable Housing in the Last Year: No       Current Outpatient Medications on File Prior to Visit   Medication Sig Dispense Refill    acetaminophen (TYLENOL) 325 MG tablet Take 325 mg by mouth every 6 (six) hours as needed for Pain.      aspirin (ECOTRIN) 81 MG EC tablet Take 81 mg by mouth once daily.      atorvastatin (LIPITOR) 80 MG tablet Take 1 tablet (80 mg total) by mouth once daily. 90 tablet 1    clopidogreL (PLAVIX) 75 mg tablet Take 1 tablet (75 mg total) by mouth once daily. 90 tablet 1    cyanocobalamin (VITAMIN B-12) 1000 MCG tablet Take 100 mcg by mouth once daily.      ergocalciferol, vitamin D2, (VITAMIN D ORAL) Take by mouth.      famotidine (PEPCID) 40 MG tablet Take 40 mg by mouth once daily at 6am.      fluocinonide (LIDEX) 0.05 % external solution Apply to affected  "area scalp once to twice daily as needed for itching (pruritus) 60 mL 3    fluticasone propionate (CUTIVATE) 0.05 % cream AAA bid 60 g 3    gabapentin (NEURONTIN) 300 MG capsule TAKE 1-2 CAPSULES BY MOUTH EVERY NIGHT AT BEDTIME 180 capsule 0    ketoconazole (NIZORAL) 2 % shampoo WASH HAIR WITH MEDICATED SHAMPOO AT LEAST 2X/WEEK - LET SIT ON SCALP AT LEAST 5 MINUTES PRIOR TO RINSING 120 mL 5    loratadine (CLARITIN) 10 mg tablet Take by mouth.      losartan (COZAAR) 50 MG tablet TAKE 0.5 TABLETS (25 MG TOTAL) BY MOUTH ONCE DAILY. 45 tablet 3    mometasone (ELOCON) 0.1 % solution Apply topically once daily. To scalp 60 mL 5    TAC 0.1%-ciclopirox-MOM Apply to affected area twice daily after cool blow dry 60 g 4    triamcinolone acetonide 0.1% (KENALOG) 0.1 % ointment       ursodioL (ACTIGALL) 250 mg Tab TAKE TABLET BY MOUTH THREE TIMES A DAY       No current facility-administered medications on file prior to visit.       Review of patient's allergies indicates:   Allergen Reactions    Morphine      Itching/Hives    Penicillins      Objective:     Vitals:    08/09/22 0803   BP: 132/79   BP Location: Right arm   Patient Position: Sitting   BP Method: Large (Automatic)   Pulse: 75   SpO2: 98%   Weight: 73.9 kg (163 lb 0.5 oz)   Height: 5' 3" (1.6 m)        Physical Exam  Constitutional:       Appearance: She is well-developed.   Eyes:      General: No scleral icterus.  Neck:      Vascular: No carotid bruit or JVD.   Cardiovascular:      Rate and Rhythm: Normal rate and regular rhythm.      Pulses:           Dorsalis pedis pulses are 2+ on the right side and 1+ on the left side.        Posterior tibial pulses are 2+ on the left side.      Heart sounds: No murmur heard.    No gallop.   Pulmonary:      Breath sounds: Normal breath sounds.   Musculoskeletal:      Right lower leg: Edema (trace) present.      Left lower leg: No edema.   Skin:     General: Skin is warm and dry.   Neurological:      Mental Status: She " "is alert and oriented to person, place, and time.   Psychiatric:         Behavior: Behavior normal.         Thought Content: Thought content normal.         Judgment: Judgment normal.      ECG: NSR, WNL    Office Visit on 03/23/2022   Component Date Value Ref Range Status    Final Pathologic Diagnosis 03/23/2022    Final                    Value:1.  Skin, right neck, shave biopsy:  - SEBORRHEIC KERATOSIS.  This lesion is benign.      Gross 03/23/2022    Final                    Value:Pathology ID/Patient ID:  [id number]  The specimen is received in formalin labeled "right neck".  The specimen is a  circular shave of brown skin measuring 0.7 x 0.6 cm .  The specimen is  bisected,  inked blue at the resection margin and submitted entirely in  cassette XQB--1-A  Shital Samayoa      Microscopic Exam 03/23/2022    Final                    Value:Sections show an abrupt proliferation of benign basaloid keratinocytes  exhibiting acanthosis, hyperkeratosis, papillomatosis and horn pseudocyst  formation.      Disclaimer 03/23/2022    Final                    Value:Unless the case is a 'gross only' or additional testing only, the final  diagnosis for each specimen is based on a microscopic examination of  appropriate tissue sections.     (    Accession #: [id number]    [facility]  Cardiac event monitor  Order# [id number]  Reading physician: Loc España MD Ordering physician: Remigio Lewis MD Study date: 5/28/21       Reason for Exam  Priority: Routine  Dx: PFO (patent foramen ovale) [Q21.1 (ICD-10-CM)]; Transient cerebral ischemia, unspecified type [G45.9 (ICD-10-CM)]         Conclusion    · Negative event monitor with no clinical arrhythmias.  · Symptoms corresponding with normal sinus rhythm.  · Asymptomatic non-sustained atrial tachycardia lastint 28 beats               Accession #: [id number]    Transthoracic echo (TTE) complete  Order# [id number]  Reading physician: Drake Fam MD Ordering " physician: Remigio Lewis MD Study date: 5/28/21       Reason for Exam  Priority: Routine  Dx: PFO (patent foramen ovale) [Q21.1 (ICD-10-CM)]     Result Image Hyperlink     Show images for Echo Color Flow Doppler? Yes; Bubble Contrast? Yes    Summary    · The left ventricle is normal in size with normal systolic function.  · The estimated ejection fraction is 63%.  · Normal left ventricular diastolic function.  · Normal right ventricular size with normal right ventricular systolic function.  · There is a patent foramen ovale present with right to left shunting indicated by saline contrast.  · Moderate right atrial enlargement.  · Mild to moderate tricuspid regurgitation.  · Mild pulmonic regurgitation.  · Normal central venous pressure (3 mmHg).  · The estimated PA systolic pressure is 28 mmHg.         Vitals    Height Weight       None     FINDINGS:  Right thigh veins: The common femoral, femoral, popliteal, upper greater saphenous, and deep femoral veins appear color Doppler patent and/or compressible.     Right calf veins: The visualized calf veins appear patent.     Contralateral CFV: The contralateral (left) common femoral vein is patent and free of thrombus.     Miscellaneous: None     Impression:     No evidence of deep venous thrombosis in the right lower extremity.        Electronically signed by: Hernando Nichols  Date:                                            08/02/2022  Time:                                           12:50    Component Ref Range & Units 6 mo ago 1 yr ago 2 yr ago 6 yr ago 8 yr ago 9 yr ago   Cholesterol 120 - 199 mg/dL 143  116 Low  CM  166 CM  175 R  181 CM  176 CM    Comment: The National Cholesterol Education Program (NCEP) has set the   following guidelines (reference ranges) for Cholesterol:   Optimal.....................<200 mg/dL   Borderline High.............200-239 mg/dL   High........................> or = 240 mg/dL    Triglycerides 30 - 150 mg/dL 118  100 CM  185 High  CM   195 High  R  186 High  CM  140 CM    Comment: The National Cholesterol Education Program (NCEP) has set the   following guidelines (reference values) for triglycerides:   Normal......................<150 mg/dL   Borderline High.............150-199 mg/dL   High........................200-499 mg/dL    HDL 40 - 75 mg/dL 54  44 CM  48 CM  46 R  45 CM  49 CM    Comment: The National Cholesterol Education Program (NCEP) has set the   following guidelines (reference values) for HDL Cholesterol:   Low...............<40 mg/dL   Optimal...........>60 mg/dL    LDL Cholesterol 63.0 - 159.0 mg/dL 65.4  52.0 Low  CM  81.0 CM  90 R, CM  98.8 CM  99.0 R, CM    Comment: The National Cholesterol Education Program (NCEP) has set the   following guidelines (reference values) for LDL Cholesterol:           Component Ref Range & Units 6 mo ago   (1/24/22) 1 yr ago   (7/12/21) 1 yr ago   (6/3/21) 1 yr ago   (11/9/20) 2 yr ago   (7/17/20) 2 yr ago   (6/19/20) 2 yr ago   (6/19/20)   Sodium 136 - 145 mmol/L 141  138  142  137  139  140     Potassium 3.5 - 5.1 mmol/L 4.2  4.1  3.8  4.1  4.1  4.2     Chloride 95 - 110 mmol/L 108  102  106  100  103  105     CO2 23 - 29 mmol/L 22 Low   26  28  28  28  27     Glucose 70 - 110 mg/dL 85  95  99  81  92  94     BUN 8 - 23 mg/dL 15  15  13  20  16  18     Creatinine 0.5 - 1.4 mg/dL 0.8  1.1  1.0  1.1  1.1  1.0     Calcium 8.7 - 10.5 mg/dL 11.0 High   11.1 High   9.6  10.8 High  VC, CM  10.3  10.8 High      Total Protein 6.0 - 8.4 g/dL 7.4  7.7     7.7  7.7    Albumin 3.5 - 5.2 g/dL 3.9  4.1     4.0  4.0    Total Bilirubin 0.1 - 1.0 mg/dL 0.6  0.7 CM     0.6 CM  0.6 CM    Comment: For infants and newborns, interpretation of results should be based   on gestational age, weight and in agreement with clinical   observations.     Premature Infant recommended reference ranges:         · 0 Result Notes    Component Ref Range & Units 6 mo ago   (1/24/22) 1 yr ago   (6/3/21) 2 yr ago   (7/29/20) 2 yr ago    (7/17/20) 6 yr ago   (6/10/16) 8 yr ago   (7/25/14) 9 yr ago   (7/22/13)   WBC 3.90 - 12.70 K/uL 6.67  6.40  6.22  7.60  5.0 R  5.43  5.38 R    RBC 4.00 - 5.40 M/uL 3.79 Low   3.51 Low   3.44 Low   3.40 Low   3.76 Low  R  3.91 Low   3.98 Low     Hemoglobin 12.0 - 16.0 g/dL 12.1  11.0 Low   11.0 Low   10.9 Low   12.2 R  12.4  12.7 R    Hematocrit 37.0 - 48.5 % 40.1  36.1 Low   35.9 Low   34.8 Low   36.6 R  38.4  39.3    MCV 82 - 98 fL 106 High   103 High   104 High   102 High   97.3 R  98  98.7 High  R    MCH 27.0 - 31.0 pg 31.9 High   31.3 High   32.0 High   32.1 High   32.4 R  31.7 High   31.9 High  R    MCHC 32.0 - 36.0 g/dL 30.2 Low   30.5 Low   30.6 Low   31.3 Low   33.3  32.3 R  32.3 R    RDW 11.5 - 14.5 % 12.5  12.7  13.0  13.0  14.1 R  12.8  12.8    Platelets 150 - 450 K/uL 195  193  201 R  248 R  172 R  195 R  188 R    MPV 9.2 - 12.9 fL 12.0  12.5  12.5  12.1  10.5 R  12.7  12.0    Immature Granulocytes 0.0 - 0.5 % 0.3  0.3  0.2  0.9 High        Gran # (ANC) 1.8 - 7.7 K/uL 3.6  3.7  3.2  4.4   2.9  3.1    Immature Grans (Abs) 0.00 - 0.04 K/uL 0.02  0.02 CM  0.01 CM  0.07 High  CM       Comment: Mild elevation in immature granulocytes is non specific and   can be seen in a variety of conditions including stress response,   acute inflammation, trauma and pregnancy. Correlation with other   laboratory and clinical findings is essential.           Assessment:     1. Dyspnea on exertion    2. Essential hypertension    3. Pure hypercholesterolemia    4. Mixed hyperlipidemia    5. Palpitations    6. PFO (patent foramen ovale)    7. Premature atrial contractions    8. Vasovagal syncope    9. Tricuspid valve insufficiency, unspecified etiology    10. H/O deep venous thrombosis    11. Hyperparathyroidism    12. Idiopathic peripheral neuropathy    13. History of CVA (cerebrovascular accident)      Plan:   Lori was seen today for follow-up.    Diagnoses and all orders for this visit:    Dyspnea on exertion  -      IN OFFICE EKG 12-LEAD (to Muse)    Essential hypertension  -     IN OFFICE EKG 12-LEAD (to Muse)    Pure hypercholesterolemia  -     IN OFFICE EKG 12-LEAD (to Muse)    Mixed hyperlipidemia  -     IN OFFICE EKG 12-LEAD (to Muse)    Palpitations  -     IN OFFICE EKG 12-LEAD (to Muse)    PFO (patent foramen ovale)  -     IN OFFICE EKG 12-LEAD (to Muse)    Premature atrial contractions  -     IN OFFICE EKG 12-LEAD (to Muse)    Vasovagal syncope  -     IN OFFICE EKG 12-LEAD (to Muse)    Tricuspid valve insufficiency, unspecified etiology  -     IN OFFICE EKG 12-LEAD (to Muse)    H/O deep venous thrombosis  -     IN OFFICE EKG 12-LEAD (to Muse)    Hyperparathyroidism  -     IN OFFICE EKG 12-LEAD (to Muse)    Idiopathic peripheral neuropathy  -     IN OFFICE EKG 12-LEAD (to Muse)    History of CVA (cerebrovascular accident)  -     IN OFFICE EKG 12-LEAD (to Muse)     Etiology of hot feeling is unclear.  Pt instructed to take BP and pulse at home if she has symptoms.    The low back and upper leg pain is likely due to spinal stenosis.    F/u with Dr Ortega for back    F/u with PCP    F/u with endocrinologist at [facility] for hyperparathyroidism    F/u with Dr Howe    Follow up in about 6 months (around 2/9/2023).

## 2022-08-11 ENCOUNTER — TELEPHONE (OUTPATIENT)
Dept: PRIMARY CARE CLINIC | Facility: CLINIC | Age: 82
End: 2022-08-11
Payer: MEDICARE

## 2022-08-11 RX ORDER — VIT C/E/ZN/COPPR/LUTEIN/ZEAXAN 250MG-90MG
1000 CAPSULE ORAL EVERY OTHER DAY
COMMUNITY
End: 2022-08-11 | Stop reason: SDUPTHER

## 2022-08-11 RX ORDER — VIT C/E/ZN/COPPR/LUTEIN/ZEAXAN 250MG-90MG
1000 CAPSULE ORAL EVERY OTHER DAY
Qty: 45 CAPSULE | Refills: 1 | Status: SHIPPED | OUTPATIENT
Start: 2022-08-11

## 2022-08-11 NOTE — TELEPHONE ENCOUNTER
I sw pt. Per 's note from Jan 22 the Vit D3 was decreased to 1000 units QOD. I updated pts record to show this

## 2022-08-11 NOTE — TELEPHONE ENCOUNTER
----- Message from Sangeetha Gar sent at 8/11/2022  1:33 PM CDT -----  Contact: pt [phone]  Patient states pharmacy is out of vitamin D2.  Pharmacy has D3 in stock patient would like to know if it is okay to take Vitamin D3.  If so please write RX for Vitamin D3. Please call and advise.    Thank you and have a great day.

## 2022-09-20 ENCOUNTER — TELEPHONE (OUTPATIENT)
Dept: DERMATOLOGY | Facility: CLINIC | Age: 82
End: 2022-09-20
Payer: MEDICARE

## 2022-09-20 NOTE — TELEPHONE ENCOUNTER
----- Message from Taylor Bermeo sent at 9/20/2022  1:38 PM CDT -----  Regarding: appt  Contact: [phone]  Pt prefers [facility] location and no avail until Jan. Needs a sooner appt date. Complains of wart on hand. Pls call

## 2022-09-20 NOTE — TELEPHONE ENCOUNTER
----- Message from Deyanira Hemphill MA sent at 9/20/2022 12:45 PM CDT -----  Regarding: appt  Contact: Lori  Pt stated she called on Friday to schedule an appt to have a wart removed. Pt is requesting a call back in regards to getting scheduled.          Confirmed Contact below:  Contact Name:Lori Pulido  Phone Number: [phone]

## 2022-10-03 ENCOUNTER — TELEPHONE (OUTPATIENT)
Dept: PRIMARY CARE CLINIC | Facility: CLINIC | Age: 82
End: 2022-10-03
Payer: MEDICARE

## 2022-10-03 DIAGNOSIS — I67.9 CEREBROVASCULAR DISEASE: ICD-10-CM

## 2022-10-03 RX ORDER — CLOPIDOGREL BISULFATE 75 MG/1
TABLET ORAL
Qty: 90 TABLET | Refills: 1 | Status: SHIPPED | OUTPATIENT
Start: 2022-10-03 | End: 2023-05-03 | Stop reason: SDUPTHER

## 2022-10-03 NOTE — TELEPHONE ENCOUNTER
I sw pt. She thought she had a flu vaccine this season in Sept. I explained that I do not show a record of it and also looked in LINKS. I rec that she contact her ins to see if they have a paid claim and they don't. She called her phar and they also do not have a record of her getting one. They told her that she could not receive a second vaccine but now she is unsure if she is confused about getting the vaccine this year. She wants to know what you think about her getting one now?

## 2022-10-03 NOTE — TELEPHONE ENCOUNTER
----- Message from Kaelyn Mitchell sent at 10/3/2022  8:51 AM CDT -----  Contact: PT    Who Called:  Lori     Does the patient know what this is regarding?: new flu shot   Would the patient rather a call back or a response via [facility]? Callback    Best Call Back Number: [phone] (home)      Additional Information:  pt had flu shot 09/2022- pt heard theres a new flu shot for seniors that is stronger

## 2022-10-03 NOTE — TELEPHONE ENCOUNTER
No new care gaps identified.  [facility] Embedded Care Gaps. Reference number: [id number]. 10/03/2022   10:52:39 AM CHINGT

## 2022-10-03 NOTE — TELEPHONE ENCOUNTER
If she has not had one then yes she needs one.  Apparently there is no record of her having had one. They just came out in the past 4 wks. If her pharm does not feel comfortable giving her one then we can because you and the pt checked all the appropriate channels.    Dr. NUNEZ

## 2022-10-03 NOTE — TELEPHONE ENCOUNTER
Pt advised that we do not have record of her receiving the vaccine.   Advised per pharmacist no side effects from getting the vaccine twice.   Advised that the insurance covers the vaccine 100% at our pharmacy.

## 2022-10-04 ENCOUNTER — TELEPHONE (OUTPATIENT)
Dept: PRIMARY CARE CLINIC | Facility: CLINIC | Age: 82
End: 2022-10-04
Payer: MEDICARE

## 2022-10-04 NOTE — TELEPHONE ENCOUNTER
I sw pt. She is concerned about possibly getting a second flu vaccine. We checked with her ins and she did not have a paid claim. We also do have any information in links. I explained to pt that it was rec that she get the flu vaccine

## 2022-10-04 NOTE — TELEPHONE ENCOUNTER
----- Message from Maryana Caceres sent at 10/4/2022 12:11 PM CDT -----  Contact: Self/[phone]  Patient is returning a phone call.  Who left a message for the patient: Teagan  Does patient know what this is regarding:  questions about a flu shot   Would you like a call back, or a response through your [facility] portal?:   call back   Comments:

## 2023-01-27 ENCOUNTER — PES CALL (OUTPATIENT)
Dept: ADMINISTRATIVE | Facility: CLINIC | Age: 83
End: 2023-01-27
Payer: MEDICARE

## 2023-02-07 ENCOUNTER — OFFICE VISIT (OUTPATIENT)
Dept: CARDIOLOGY | Facility: CLINIC | Age: 83
End: 2023-02-07
Payer: MEDICARE

## 2023-02-07 VITALS
SYSTOLIC BLOOD PRESSURE: 137 MMHG | BODY MASS INDEX: 27.87 KG/M2 | WEIGHT: 157.31 LBS | HEIGHT: 63 IN | OXYGEN SATURATION: 96 % | DIASTOLIC BLOOD PRESSURE: 79 MMHG | HEART RATE: 76 BPM

## 2023-02-07 DIAGNOSIS — I10 PRIMARY HYPERTENSION: ICD-10-CM

## 2023-02-07 DIAGNOSIS — Z86.718 H/O DEEP VENOUS THROMBOSIS: ICD-10-CM

## 2023-02-07 DIAGNOSIS — E78.2 MIXED HYPERLIPIDEMIA: ICD-10-CM

## 2023-02-07 DIAGNOSIS — I49.1 PREMATURE ATRIAL CONTRACTIONS: ICD-10-CM

## 2023-02-07 DIAGNOSIS — Z86.73 HISTORY OF CVA (CEREBROVASCULAR ACCIDENT): Primary | ICD-10-CM

## 2023-02-07 DIAGNOSIS — Q21.12 PFO (PATENT FORAMEN OVALE): ICD-10-CM

## 2023-02-07 DIAGNOSIS — G62.9 NEUROPATHY: ICD-10-CM

## 2023-02-07 PROCEDURE — 3288F PR FALLS RISK ASSESSMENT DOCUMENTED: ICD-10-PCS | Mod: CPTII,S$GLB,, | Performed by: INTERNAL MEDICINE

## 2023-02-07 PROCEDURE — 1159F MED LIST DOCD IN RCRD: CPT | Mod: CPTII,S$GLB,, | Performed by: INTERNAL MEDICINE

## 2023-02-07 PROCEDURE — 3078F DIAST BP <80 MM HG: CPT | Mod: CPTII,S$GLB,, | Performed by: INTERNAL MEDICINE

## 2023-02-07 PROCEDURE — 99999 PR PBB SHADOW E&M-EST. PATIENT-LVL III: ICD-10-PCS | Mod: PBBFAC,,, | Performed by: INTERNAL MEDICINE

## 2023-02-07 PROCEDURE — 93000 EKG 12-LEAD: ICD-10-PCS | Mod: S$GLB,,, | Performed by: INTERNAL MEDICINE

## 2023-02-07 PROCEDURE — 1160F RVW MEDS BY RX/DR IN RCRD: CPT | Mod: CPTII,S$GLB,, | Performed by: INTERNAL MEDICINE

## 2023-02-07 PROCEDURE — 3075F PR MOST RECENT SYSTOLIC BLOOD PRESS GE 130-139MM HG: ICD-10-PCS | Mod: CPTII,S$GLB,, | Performed by: INTERNAL MEDICINE

## 2023-02-07 PROCEDURE — 1125F AMNT PAIN NOTED PAIN PRSNT: CPT | Mod: CPTII,S$GLB,, | Performed by: INTERNAL MEDICINE

## 2023-02-07 PROCEDURE — 99213 PR OFFICE/OUTPT VISIT, EST, LEVL III, 20-29 MIN: ICD-10-PCS | Mod: 25,S$GLB,, | Performed by: INTERNAL MEDICINE

## 2023-02-07 PROCEDURE — 93000 ELECTROCARDIOGRAM COMPLETE: CPT | Mod: S$GLB,,, | Performed by: INTERNAL MEDICINE

## 2023-02-07 PROCEDURE — 1125F PR PAIN SEVERITY QUANTIFIED, PAIN PRESENT: ICD-10-PCS | Mod: CPTII,S$GLB,, | Performed by: INTERNAL MEDICINE

## 2023-02-07 PROCEDURE — 3078F PR MOST RECENT DIASTOLIC BLOOD PRESSURE < 80 MM HG: ICD-10-PCS | Mod: CPTII,S$GLB,, | Performed by: INTERNAL MEDICINE

## 2023-02-07 PROCEDURE — 1101F PT FALLS ASSESS-DOCD LE1/YR: CPT | Mod: CPTII,S$GLB,, | Performed by: INTERNAL MEDICINE

## 2023-02-07 PROCEDURE — 1160F PR REVIEW ALL MEDS BY PRESCRIBER/CLIN PHARMACIST DOCUMENTED: ICD-10-PCS | Mod: CPTII,S$GLB,, | Performed by: INTERNAL MEDICINE

## 2023-02-07 PROCEDURE — 3075F SYST BP GE 130 - 139MM HG: CPT | Mod: CPTII,S$GLB,, | Performed by: INTERNAL MEDICINE

## 2023-02-07 PROCEDURE — 3288F FALL RISK ASSESSMENT DOCD: CPT | Mod: CPTII,S$GLB,, | Performed by: INTERNAL MEDICINE

## 2023-02-07 PROCEDURE — 99999 PR PBB SHADOW E&M-EST. PATIENT-LVL III: CPT | Mod: PBBFAC,,, | Performed by: INTERNAL MEDICINE

## 2023-02-07 PROCEDURE — 1101F PR PT FALLS ASSESS DOC 0-1 FALLS W/OUT INJ PAST YR: ICD-10-PCS | Mod: CPTII,S$GLB,, | Performed by: INTERNAL MEDICINE

## 2023-02-07 PROCEDURE — 1159F PR MEDICATION LIST DOCUMENTED IN MEDICAL RECORD: ICD-10-PCS | Mod: CPTII,S$GLB,, | Performed by: INTERNAL MEDICINE

## 2023-02-07 PROCEDURE — 99213 OFFICE O/P EST LOW 20 MIN: CPT | Mod: 25,S$GLB,, | Performed by: INTERNAL MEDICINE

## 2023-02-07 RX ORDER — PREGABALIN 75 MG/1
75 CAPSULE ORAL NIGHTLY
COMMUNITY
Start: 2023-01-18

## 2023-02-07 RX ORDER — LIDOCAINE 50 MG/G
1 PATCH TOPICAL
COMMUNITY
Start: 2023-01-16 | End: 2023-02-15

## 2023-02-07 NOTE — PROGRESS NOTES
Subjective:      Patient ID: Lori Pulido is a 83 y.o. female.    Chief Complaint: Follow-up    HPI:  Main problem is right sided sciatica.    Dr Howard recommended [facility] physical therapy.    Review of Systems   Cardiovascular:  Negative for chest pain, claudication, dyspnea on exertion, irregular heartbeat, leg swelling, near-syncope, orthopnea, palpitations and syncope.      Past Medical History:   Diagnosis Date    Allergic rhinitis     Choledocholithiasis     DVT (deep venous thrombosis)     after TKA    GERD (gastroesophageal reflux disease)     Hemorrhoids     Hyperlipidemia     Hypertension     Neuropathy     Nicotine dependence     Stroke     Stroke 2020        Past Surgical History:   Procedure Laterality Date    APPENDECTOMY      BREAST SURGERY      lumpectomy    CARPAL TUNNEL RELEASE      CATARACT EXTRACTION BILATERAL W/ ANTERIOR VITRECTOMY       SECTION      HYSTERECTOMY      partial    LAPAROSCOPIC CHOLECYSTECTOMY WITH CHOLANGIOGRAPHY  2019    TOTAL KNEE ARTHROPLASTY      L knee       Family History   Problem Relation Age of Onset    Stroke Mother 80    Heart disease Father     Cancer Sister 89        breast cancer    Dementia Sister     Stroke Brother 68    Coronary artery disease Brother         s/p CABG    Cerebral aneurysm Other     Cerebral aneurysm Other        Social History     Socioeconomic History    Marital status:    Tobacco Use    Smoking status: Some Days     Years: 20.00     Types: Cigarettes    Smokeless tobacco: Never    Tobacco comments:     5-6 cigarettes a day   Substance and Sexual Activity    Alcohol use: No    Drug use: Never    Sexual activity: Not Currently     Social Determinants of Health     Financial Resource Strain: Low Risk     Difficulty of Paying Living Expenses: Not hard at all   Food Insecurity: No Food Insecurity    Worried About Running Out of Food in the Last Year: Never true    Ran Out of Food in the Last Year: Never true    Transportation Needs: No Transportation Needs    Lack of Transportation (Medical): No    Lack of Transportation (Non-Medical): No   Physical Activity: Insufficiently Active    Days of Exercise per Week: 5 days    Minutes of Exercise per Session: 10 min   Stress: No Stress Concern Present    Feeling of Stress : Only a little   Social Connections: Moderately Isolated    Frequency of Communication with Friends and Family: More than three times a week    Frequency of Social Gatherings with Friends and Family: More than three times a week    Attends Restoration[employer] Services: More than 4 times per year    Active Member of Clubs or Organizations: No    Attends Club or Organization Meetings: Never    Marital Status:    Housing Stability: Low Risk     Unable to Pay for Housing in the Last Year: No    Number of Places Lived in the Last Year: 1    Unstable Housing in the Last Year: No       Current Outpatient Medications on File Prior to Visit   Medication Sig Dispense Refill    acetaminophen (TYLENOL) 325 MG tablet Take 325 mg by mouth every 6 (six) hours as needed for Pain.      aspirin (ECOTRIN) 81 MG EC tablet Take 81 mg by mouth once daily.      atorvastatin (LIPITOR) 80 MG tablet TAKE 1 TABLET (80 MG TOTAL) BY MOUTH ONCE DAILY. 90 tablet 0    cholecalciferol, vitamin D3, (VITAMIN D3) 25 mcg (1,000 unit) capsule Take 1 capsule (1,000 Units total) by mouth every other day. Per  45 capsule 1    clopidogreL (PLAVIX) 75 mg tablet TAKE 1 TABLET BY MOUTH EVERY DAY 90 tablet 1    cyanocobalamin (VITAMIN B-12) 1000 MCG tablet Take 100 mcg by mouth once daily.      famotidine (PEPCID) 40 MG tablet Take 40 mg by mouth once daily at 6am.      fluocinonide (LIDEX) 0.05 % external solution Apply to affected area scalp once to twice daily as needed for itching (pruritus) 60 mL 3    fluticasone propionate (CUTIVATE) 0.05 % cream AAA bid 60 g 3    ketoconazole (NIZORAL) 2 % shampoo WASH HAIR WITH MEDICATED SHAMPOO AT LEAST  "2X/WEEK - LET SIT ON SCALP AT LEAST 5 MINUTES PRIOR TO RINSING 120 mL 5    LIDOcaine (LIDODERM) 5 % Place 1 patch onto the skin.      loratadine (CLARITIN) 10 mg tablet Take by mouth.      losartan (COZAAR) 50 MG tablet TAKE 0.5 TABLETS (25 MG TOTAL) BY MOUTH ONCE DAILY. 45 tablet 3    mometasone (ELOCON) 0.1 % solution Apply topically once daily. To scalp 60 mL 5    TAC 0.1%-ciclopirox-MOM Apply to affected area twice daily after cool blow dry 60 g 4    triamcinolone acetonide 0.1% (KENALOG) 0.1 % ointment       ursodioL (ACTIGALL) 250 mg Tab TAKE TABLET BY MOUTH THREE TIMES A DAY      pregabalin (LYRICA) 75 MG capsule Take 75 mg by mouth every evening.      [DISCONTINUED] gabapentin (NEURONTIN) 300 MG capsule TAKE 1-2 CAPSULES BY MOUTH EVERY NIGHT AT BEDTIME (Patient not taking: Reported on 2/7/2023) 180 capsule 0     No current facility-administered medications on file prior to visit.       Review of patient's allergies indicates:   Allergen Reactions    Morphine      Itching/Hives    Penicillins      Objective:     Vitals:    02/07/23 0914 02/07/23 0931   BP: (!) 149/92 137/79   BP Location: Right arm Left arm   Patient Position: Sitting Sitting   BP Method: Large (Automatic)    Pulse: 76    SpO2: 96%    Weight: 71.3 kg (157 lb 4.8 oz)    Height: 5' 3" (1.6 m)         Physical Exam  Vitals reviewed.   Constitutional:       Appearance: She is well-developed.   Eyes:      General: No scleral icterus.  Neck:      Vascular: No carotid bruit or JVD.   Cardiovascular:      Rate and Rhythm: Normal rate and regular rhythm.      Heart sounds: No murmur heard.    No gallop.   Pulmonary:      Breath sounds: Normal breath sounds.   Musculoskeletal:      Right lower leg: No edema.      Left lower leg: No edema.   Skin:     General: Skin is warm and dry.   Neurological:      Mental Status: She is alert and oriented to person, place, and time.   Psychiatric:         Behavior: Behavior normal.         Thought Content: Thought " content normal.         Judgment: Judgment normal.      ECG today: NSR, leftward axis, clockwise rotation, inverted T wave V3, reviewed by me    05    MyChart Results Release    [employer] Status: Active  Results Release       Contains abnormal data CBC Auto Differential  Order: [id number]  Status: Final result    Visible to patient: Yes (not seen)     Next appt: None    Dx: Pure hypercholesterolemia; Mixed hype...     0 Result Notes  Component Ref Range & Units 1 yr ago   (1/24/22) 1 yr ago   (6/3/21) 2 yr ago   (7/29/20) 2 yr ago   (7/17/20) 6 yr ago   (6/10/16) 8 yr ago   (7/25/14) 9 yr ago   (7/22/13)   WBC 3.90 - 12.70 K/uL 6.67  6.40  6.22  7.60  5.0 R  5.43  5.38 R    RBC 4.00 - 5.40 M/uL 3.79 Low   3.51 Low   3.44 Low   3.40 Low   3.76 Low  R  3.91 Low   3.98 Low     Hemoglobin 12.0 - 16.0 g/dL 12.1  11.0 Low   11.0 Low   10.9 Low   12.2 R  12.4  12.7 R    Hematocrit 37.0 - 48.5 % 40.1  36.1 Low   35.9 Low   34.8 Low   36.6 R  38.4  39.3    MCV 82 - 98 fL 106 High   103 High   104 High   102 High   97.3 R  98  98.7 High  R    MCH 27.0 - 31.0 pg 31.9 High   31.3 High   32.0 High   32.1 High   32.4 R  31.7 High   31.9 High  R    MCHC 32.0 - 36.0 g/dL 30.2 Low   30.5 Low   30.6 Low   31.3 Low   33.3  32.3 R  32.3 R    RDW 11.5 - 14.5 % 12.5  12.7  13.0  13.0  14.1 R  12.8  12.8    Platelets 150 - 450 K/uL 195  193  201 R  248 R  172 R  195 R  188 R    MPV 9.2 - 12.9 fL 12.0  12.5  12.5  12.1  10.5 R  12.7  12.0    Immature Granulocytes 0.0 - 0.5 % 0.3  0.3  0.2  0.9 High        Gran # (ANC) 1.8 - 7.7 K/uL 3.6  3.7  3.2  4.4   2.9  3.1    Immature Grans (Abs) 0.00 - 0.04 K/uL 0.02  0.02 CM  0.01 CM  0.07 High  CM       Comment: Mild elevation in immature granulocytes is non specific and   can be seen in a variety of conditions including stress response,   acute inflammation, trauma and pregnancy. Correlation with other   laboratory and clinical findings is essential.    Lymph # 1.0 - 4.8 K/uL 2.3  2.0  2.4  2.4   1,495 R  2.0  1.8 R    Mono # 0.3 - 1.0 K/uL 0.5  0.4  0.4  0.5  330 R  0.3  0.4 R    Eos # 0.0 - 0.5 K/uL 0.2  0.2  0.2  0.2  145 R  0.1  0.1 R    Baso # 0.00 - 0.20 K/uL 0.05  0.05  0.06  0.07  10 R  0.02  0.0 R    nRBC 0 /100 WBC 0  0  0  0       Gran % 38.0 - 73.0 % 53.9  58.4  50.7  58.0   53.9  56.9 R    Lymph % 18.0 - 48.0 % 35.1  31.3  38.1  30.9  29.9 R  36.8  33.6 R    Mono % 4.0 - 15.0 % 6.9  5.9  6.8  6.8  6.6 R  6.3  6.5 R    Eosinophil % 0.0 - 8.0 % 3.1  3.3  3.2  2.5  2.9 R  2.2  2.0 R    Basophil % 0.0 - 1.9 % 0.7  0.8  1.0  0.9  0.2 R  0.4  0.4 R    Differential Method  Automated  Automated  Automated  Automated   Automated     Neutrophils, Abs                Component Ref Range & Units 1 yr ago   (1/24/22) 1 yr ago   (7/12/21) 1 yr ago   (6/3/21) 2 yr ago   (11/9/20) 2 yr ago   (7/17/20) 2 yr ago   (6/19/20) 2 yr ago   (6/19/20)   Sodium 136 - 145 mmol/L 141  138  142  137  139  140     Potassium 3.5 - 5.1 mmol/L 4.2  4.1  3.8  4.1  4.1  4.2     Chloride 95 - 110 mmol/L 108  102  106  100  103  105     CO2 23 - 29 mmol/L 22 Low   26  28  28  28  27     Glucose 70 - 110 mg/dL 85  95  99  81  92  94     BUN 8 - 23 mg/dL 15  15  13  20  16  18     Creatinine 0.5 - 1.4 mg/dL 0.8  1.1  1.0  1.1  1.1  1.0     Calcium 8.7 - 10.5 mg/dL 11.0 High   11.1 High   9.6  10.8 High  VC, CM  10.3  10.8 High      Total Protein 6.0 - 8.4 g/dL 7.4  7.7     7.7  7.7    Albumin 3.5 - 5.2 g/dL 3.9  4.1     4.0  4.0    Total Bilirubin 0.1 - 1.0 mg/dL 0.6  0.7 CM     0.6 CM  0.6 CM    Comment: For infants and newborns, interpretation of results should be based   on gestational age, weight and in agreement with clinical   observations.     Premature Infant recommended reference ranges:   Up to 24 hours.............<8.0 mg/dL   Up to 48 hours............<12.0 mg/dL   3-5 days..................<15.0 mg/dL   6-29 days.................<15.0 mg/dL    Alkaline Phosphatase 55 - 135 U/L 107  113     107  107    AST 10 - 40 U/L 16   21     16  15    ALT 10 - 44 U/L 13  14     10  9 Low     Anion Gap 8 - 16 mmol/L 11  10  8  9  8  8     eGFR if African American >60 mL/min/1.73 m^2 >60.0  54.0 Abnormal   >60.0  54.4 Abnormal   54.4 Abnormal   >60.0     eGFR if non African American >60 mL/min/1.73 m^2 >60.0  46.9 Abnormal  CM  52.6 Abnormal  CM  47.2 Abnormal  CM  47.2 Abnormal  CM  53.0 Abnormal  CM     Comment: Calculation used to obtain the estimated glomerular filtration   rate (eGFR) is the CKD-EPI equation.       Result Notes    1 HM Topic  Component Ref Range & Units 1 yr ago   (1/24/22) 1 yr ago   (6/3/21) 2 yr ago   (6/19/20) 6 yr ago   (6/10/16) 8 yr ago   (7/25/14) 9 yr ago   (7/22/13)   Cholesterol 120 - 199 mg/dL 143  116 Low  CM  166 CM  175 R  181 CM  176 CM    Comment: The National Cholesterol Education Program (NCEP) has set the   following guidelines (reference ranges) for Cholesterol:   Optimal.....................<200 mg/dL   Borderline High.............200-239 mg/dL   High........................> or = 240 mg/dL    Triglycerides 30 - 150 mg/dL 118  100 CM  185 High  CM  195 High  R  186 High  CM  140 CM    Comment: The National Cholesterol Education Program (NCEP) has set the   following guidelines (reference values) for triglycerides:   Normal......................<150 mg/dL   Borderline High.............150-199 mg/dL   High........................200-499 mg/dL    HDL 40 - 75 mg/dL 54  44 CM  48 CM  46 R  45 CM  49 CM    Comment: The National Cholesterol Education Program (NCEP) has set the   following guidelines (reference values) for HDL Cholesterol:   Low...............<40 mg/dL   Optimal...........>60 mg/dL    LDL Cholesterol 63.0 - 159.0 mg/dL 65.4  52.0 Low  CM  81.0 CM  90 R, CM  98.8 CM  99.0 R, CM    Comment: The National Cholesterol Education Program (NCEP) has set the   following guidelines (reference values) for LDL Cholesterol:   Optimal.......................<130 mg/dL   Borderline High...............130-159  mg/dL   High..........................160-189 mg/dL   Very High.....................>190 mg/dL    HDL/Cholesterol Ratio 20.0 - 50.0 % 37.8  37.9  28.9  3.8 R  24.9  27.8 R    Total Cholesterol/HDL Ratio 2.0 - 5.0 2.6  2.6  3.5   4.0  3.6 R    Non-HDL Cholesterol mg/dL 89  72 CM  118 CM   136 CM     Comment: Risk category and Non-HDL cholesterol goals:   Coronary heart disease (CHD)or equivalent (10-year risk of CHD >20%):       0 Result Notes  Component Ref Range & Units 1 yr ago 8 yr ago 9 yr ago   TSH 0.400 - 4.000 uIU/mL 1.988  1.355  1.255 R    Resulting Agency  [facility]              Specimen Collected: 01/24/22 07:05 Last Resulted: 01/24/22 18:23               Assessment:     1. History of CVA (cerebrovascular accident)    2. Primary hypertension    3. Mixed hyperlipidemia    4. Premature atrial contractions    5. PFO (patent foramen ovale)    6. H/O deep venous thrombosis    7. Neuropathy      Plan:   Lori was seen today for follow-up.    Diagnoses and all orders for this visit:    History of CVA (cerebrovascular accident)  -     IN OFFICE EKG 12-LEAD (to Muse)  -     CBC Auto Differential; Future  -     Comprehensive Metabolic Panel; Future  -     Lipid Panel; Future  -     TSH; Future    Primary hypertension  -     IN OFFICE EKG 12-LEAD (to Muse)  -     CBC Auto Differential; Future  -     Comprehensive Metabolic Panel; Future  -     Lipid Panel; Future  -     TSH; Future    Mixed hyperlipidemia  -     IN OFFICE EKG 12-LEAD (to Muse)  -     CBC Auto Differential; Future  -     Comprehensive Metabolic Panel; Future  -     Lipid Panel; Future  -     TSH; Future    Premature atrial contractions  -     IN OFFICE EKG 12-LEAD (to [facility])  -     CBC Auto Differential; Future  -     Comprehensive Metabolic Panel; Future  -     Lipid Panel; Future  -     TSH; Future    PFO (patent foramen ovale)  -     IN OFFICE EKG 12-LEAD (to [facility])  -     CBC Auto Differential; Future  -     Comprehensive Metabolic Panel;  Future  -     Lipid Panel; Future  -     TSH; Future    H/O deep venous thrombosis  -     IN OFFICE EKG 12-LEAD (to [facility])  -     CBC Auto Differential; Future  -     Comprehensive Metabolic Panel; Future  -     Lipid Panel; Future  -     TSH; Future    Neuropathy  -     IN OFFICE EKG 12-LEAD (to [facility])  -     CBC Auto Differential; Future  -     Comprehensive Metabolic Panel; Future  -     Lipid Panel; Future  -     TSH; Future     Agree with PT and needling for sciatica    Pt encouraged to consider f/u [facility] with pain management.  Pt has received epidural steroid shots in the past from dr Sin.    Same meds    RTC 6 months with lab    Follow up in about 6 months (around 8/7/2023).

## 2023-03-26 DIAGNOSIS — I10 ESSENTIAL HYPERTENSION: ICD-10-CM

## 2023-03-26 DIAGNOSIS — E78.00 PURE HYPERCHOLESTEROLEMIA: ICD-10-CM

## 2023-03-26 RX ORDER — ATORVASTATIN CALCIUM 80 MG/1
80 TABLET, FILM COATED ORAL DAILY
Qty: 90 TABLET | Refills: 0 | OUTPATIENT
Start: 2023-03-26

## 2023-03-26 RX ORDER — LOSARTAN POTASSIUM 50 MG/1
TABLET ORAL
Qty: 45 TABLET | Refills: 3 | OUTPATIENT
Start: 2023-03-26

## 2023-03-26 NOTE — TELEPHONE ENCOUNTER
Refill Routing Note   Medication(s) are not appropriate for processing by [facility] for the following reason(s):      Responsible provider unclear    [facility] action(s):  Defer     Medication Therapy Plan: Unclear if patient follows with you; defer      Appointments  past 12m or future 3m with PCP    Date Provider   Last Visit   5/6/2022 Fannie Du MD   Next Visit   Visit date not found Fannie Du MD   ED visits in past 90 days: 0        Note composed:5:23 PM 03/26/2023           
absent

## 2023-03-31 ENCOUNTER — PATIENT MESSAGE (OUTPATIENT)
Dept: DERMATOLOGY | Facility: CLINIC | Age: 83
End: 2023-03-31
Payer: MEDICARE

## 2023-04-11 DIAGNOSIS — I10 ESSENTIAL HYPERTENSION: ICD-10-CM

## 2023-04-11 DIAGNOSIS — E78.00 PURE HYPERCHOLESTEROLEMIA: ICD-10-CM

## 2023-04-12 RX ORDER — ATORVASTATIN CALCIUM 80 MG/1
80 TABLET, FILM COATED ORAL DAILY
Qty: 90 TABLET | Refills: 0 | Status: SHIPPED | OUTPATIENT
Start: 2023-04-12 | End: 2023-04-12

## 2023-04-12 RX ORDER — LOSARTAN POTASSIUM 50 MG/1
TABLET ORAL
Qty: 45 TABLET | Refills: 3 | Status: SHIPPED | OUTPATIENT
Start: 2023-04-12 | End: 2023-04-12

## 2023-04-12 NOTE — TELEPHONE ENCOUNTER
Refill Routing Note   Medication(s) are not appropriate for processing by [facility] for the following reason(s):      Responsible provider unclear    [facility] action(s):  Defer     Medication Therapy Plan: Per Epic data dated 1/23/23, possibility patient may have changed PCP and returned to [facility]      Appointments  past 12m or future 3m with PCP    Date Provider   Last Visit   5/6/2022 Fannie Du MD   Next Visit   Visit date not found Fannie Du MD   ED visits in past 90 days: 0        Note composed:8:52 PM 04/11/2023

## 2023-04-12 NOTE — TELEPHONE ENCOUNTER
Please call pharm and cancel these as pt switched to a new pcp.  They keep sending me her refills and I keep denying them but I accidentally clicked approve and tried to refuse it    Dr. NUNEZ

## 2023-05-03 DIAGNOSIS — I67.9 CEREBROVASCULAR DISEASE: ICD-10-CM

## 2023-05-03 RX ORDER — CLOPIDOGREL BISULFATE 75 MG/1
75 TABLET ORAL DAILY
Qty: 90 TABLET | Refills: 3 | Status: SHIPPED | OUTPATIENT
Start: 2023-05-03

## 2023-08-04 ENCOUNTER — LAB VISIT (OUTPATIENT)
Dept: LAB | Facility: HOSPITAL | Age: 83
End: 2023-08-04
Attending: INTERNAL MEDICINE
Payer: MEDICARE

## 2023-08-04 DIAGNOSIS — Z86.73 HISTORY OF CVA (CEREBROVASCULAR ACCIDENT): ICD-10-CM

## 2023-08-04 DIAGNOSIS — E78.2 MIXED HYPERLIPIDEMIA: ICD-10-CM

## 2023-08-04 DIAGNOSIS — G62.9 NEUROPATHY: ICD-10-CM

## 2023-08-04 DIAGNOSIS — I49.1 PREMATURE ATRIAL CONTRACTIONS: ICD-10-CM

## 2023-08-04 DIAGNOSIS — Q21.12 PFO (PATENT FORAMEN OVALE): ICD-10-CM

## 2023-08-04 DIAGNOSIS — Z86.718 H/O DEEP VENOUS THROMBOSIS: ICD-10-CM

## 2023-08-04 DIAGNOSIS — I10 PRIMARY HYPERTENSION: ICD-10-CM

## 2023-08-04 LAB
ALBUMIN SERPL BCP-MCNC: 4.1 G/DL (ref 3.5–5.2)
ALP SERPL-CCNC: 116 U/L (ref 55–135)
ALT SERPL W/O P-5'-P-CCNC: 10 U/L (ref 10–44)
ANION GAP SERPL CALC-SCNC: 11 MMOL/L (ref 8–16)
AST SERPL-CCNC: 19 U/L (ref 10–40)
BASOPHILS # BLD AUTO: 0.04 K/UL (ref 0–0.2)
BASOPHILS NFR BLD: 0.7 % (ref 0–1.9)
BILIRUB SERPL-MCNC: 0.7 MG/DL (ref 0.1–1)
BUN SERPL-MCNC: 16 MG/DL (ref 8–23)
CALCIUM SERPL-MCNC: 10.8 MG/DL (ref 8.7–10.5)
CHLORIDE SERPL-SCNC: 106 MMOL/L (ref 95–110)
CHOLEST SERPL-MCNC: 124 MG/DL (ref 120–199)
CHOLEST/HDLC SERPL: 2.8 {RATIO} (ref 2–5)
CO2 SERPL-SCNC: 25 MMOL/L (ref 23–29)
CREAT SERPL-MCNC: 0.8 MG/DL (ref 0.5–1.4)
DIFFERENTIAL METHOD: ABNORMAL
EOSINOPHIL # BLD AUTO: 0.2 K/UL (ref 0–0.5)
EOSINOPHIL NFR BLD: 3.6 % (ref 0–8)
ERYTHROCYTE [DISTWIDTH] IN BLOOD BY AUTOMATED COUNT: 12.8 % (ref 11.5–14.5)
EST. GFR  (NO RACE VARIABLE): >60 ML/MIN/1.73 M^2
GLUCOSE SERPL-MCNC: 92 MG/DL (ref 70–110)
HCT VFR BLD AUTO: 37.5 % (ref 37–48.5)
HDLC SERPL-MCNC: 45 MG/DL (ref 40–75)
HDLC SERPL: 36.3 % (ref 20–50)
HGB BLD-MCNC: 12 G/DL (ref 12–16)
IMM GRANULOCYTES # BLD AUTO: 0.01 K/UL (ref 0–0.04)
IMM GRANULOCYTES NFR BLD AUTO: 0.2 % (ref 0–0.5)
LDLC SERPL CALC-MCNC: 58.4 MG/DL (ref 63–159)
LYMPHOCYTES # BLD AUTO: 1.8 K/UL (ref 1–4.8)
LYMPHOCYTES NFR BLD: 31.4 % (ref 18–48)
MCH RBC QN AUTO: 32.2 PG (ref 27–31)
MCHC RBC AUTO-ENTMCNC: 32 G/DL (ref 32–36)
MCV RBC AUTO: 101 FL (ref 82–98)
MONOCYTES # BLD AUTO: 0.4 K/UL (ref 0.3–1)
MONOCYTES NFR BLD: 6.8 % (ref 4–15)
NEUTROPHILS # BLD AUTO: 3.2 K/UL (ref 1.8–7.7)
NEUTROPHILS NFR BLD: 57.3 % (ref 38–73)
NONHDLC SERPL-MCNC: 79 MG/DL
NRBC BLD-RTO: 0 /100 WBC
PLATELET # BLD AUTO: 163 K/UL (ref 150–450)
PMV BLD AUTO: 12.4 FL (ref 9.2–12.9)
POTASSIUM SERPL-SCNC: 4.5 MMOL/L (ref 3.5–5.1)
PROT SERPL-MCNC: 7.4 G/DL (ref 6–8.4)
RBC # BLD AUTO: 3.73 M/UL (ref 4–5.4)
SODIUM SERPL-SCNC: 142 MMOL/L (ref 136–145)
TRIGL SERPL-MCNC: 103 MG/DL (ref 30–150)
TSH SERPL DL<=0.005 MIU/L-ACNC: 1.37 UIU/ML (ref 0.4–4)
WBC # BLD AUTO: 5.58 K/UL (ref 3.9–12.7)

## 2023-08-04 PROCEDURE — 80061 LIPID PANEL: CPT | Performed by: INTERNAL MEDICINE

## 2023-08-04 PROCEDURE — 85025 COMPLETE CBC W/AUTO DIFF WBC: CPT | Performed by: INTERNAL MEDICINE

## 2023-08-04 PROCEDURE — 84443 ASSAY THYROID STIM HORMONE: CPT | Performed by: INTERNAL MEDICINE

## 2023-08-04 PROCEDURE — 36415 COLL VENOUS BLD VENIPUNCTURE: CPT | Mod: PN | Performed by: INTERNAL MEDICINE

## 2023-08-04 PROCEDURE — 80053 COMPREHEN METABOLIC PANEL: CPT | Performed by: INTERNAL MEDICINE

## 2023-08-15 ENCOUNTER — OFFICE VISIT (OUTPATIENT)
Dept: CARDIOLOGY | Facility: CLINIC | Age: 83
End: 2023-08-15
Payer: MEDICARE

## 2023-08-15 VITALS
HEART RATE: 73 BPM | WEIGHT: 146.69 LBS | BODY MASS INDEX: 25.99 KG/M2 | DIASTOLIC BLOOD PRESSURE: 84 MMHG | OXYGEN SATURATION: 97 % | HEIGHT: 63 IN | SYSTOLIC BLOOD PRESSURE: 137 MMHG

## 2023-08-15 DIAGNOSIS — M17.11 PRIMARY OSTEOARTHRITIS OF RIGHT KNEE: ICD-10-CM

## 2023-08-15 DIAGNOSIS — Q21.12 PFO (PATENT FORAMEN OVALE): ICD-10-CM

## 2023-08-15 DIAGNOSIS — Z86.718 H/O DEEP VENOUS THROMBOSIS: ICD-10-CM

## 2023-08-15 DIAGNOSIS — R41.3 MEMORY IMPAIRMENT: ICD-10-CM

## 2023-08-15 DIAGNOSIS — Z86.73 HISTORY OF CVA (CEREBROVASCULAR ACCIDENT): ICD-10-CM

## 2023-08-15 DIAGNOSIS — E21.3 HYPERPARATHYROIDISM: ICD-10-CM

## 2023-08-15 DIAGNOSIS — I10 ESSENTIAL HYPERTENSION: Primary | ICD-10-CM

## 2023-08-15 DIAGNOSIS — I27.20 PULMONARY HYPERTENSION: ICD-10-CM

## 2023-08-15 DIAGNOSIS — E78.00 PURE HYPERCHOLESTEROLEMIA: ICD-10-CM

## 2023-08-15 DIAGNOSIS — I20.89 OTHER FORMS OF ANGINA PECTORIS: ICD-10-CM

## 2023-08-15 DIAGNOSIS — I49.1 PREMATURE ATRIAL CONTRACTIONS: ICD-10-CM

## 2023-08-15 PROBLEM — I48.0 PAROXYSMAL ATRIAL FIBRILLATION: Status: ACTIVE | Noted: 2023-08-15

## 2023-08-15 PROCEDURE — 3288F PR FALLS RISK ASSESSMENT DOCUMENTED: ICD-10-PCS | Mod: CPTII,S$GLB,, | Performed by: INTERNAL MEDICINE

## 2023-08-15 PROCEDURE — 1125F PR PAIN SEVERITY QUANTIFIED, PAIN PRESENT: ICD-10-PCS | Mod: CPTII,S$GLB,, | Performed by: INTERNAL MEDICINE

## 2023-08-15 PROCEDURE — 1159F MED LIST DOCD IN RCRD: CPT | Mod: CPTII,S$GLB,, | Performed by: INTERNAL MEDICINE

## 2023-08-15 PROCEDURE — 1159F PR MEDICATION LIST DOCUMENTED IN MEDICAL RECORD: ICD-10-PCS | Mod: CPTII,S$GLB,, | Performed by: INTERNAL MEDICINE

## 2023-08-15 PROCEDURE — 3079F PR MOST RECENT DIASTOLIC BLOOD PRESSURE 80-89 MM HG: ICD-10-PCS | Mod: CPTII,S$GLB,, | Performed by: INTERNAL MEDICINE

## 2023-08-15 PROCEDURE — 1160F PR REVIEW ALL MEDS BY PRESCRIBER/CLIN PHARMACIST DOCUMENTED: ICD-10-PCS | Mod: CPTII,S$GLB,, | Performed by: INTERNAL MEDICINE

## 2023-08-15 PROCEDURE — 1101F PT FALLS ASSESS-DOCD LE1/YR: CPT | Mod: CPTII,S$GLB,, | Performed by: INTERNAL MEDICINE

## 2023-08-15 PROCEDURE — 99213 OFFICE O/P EST LOW 20 MIN: CPT | Mod: S$GLB,,, | Performed by: INTERNAL MEDICINE

## 2023-08-15 PROCEDURE — 99213 PR OFFICE/OUTPT VISIT, EST, LEVL III, 20-29 MIN: ICD-10-PCS | Mod: S$GLB,,, | Performed by: INTERNAL MEDICINE

## 2023-08-15 PROCEDURE — 99999 PR PBB SHADOW E&M-EST. PATIENT-LVL IV: ICD-10-PCS | Mod: PBBFAC,,, | Performed by: INTERNAL MEDICINE

## 2023-08-15 PROCEDURE — 93000 EKG 12-LEAD: ICD-10-PCS | Mod: S$GLB,,, | Performed by: INTERNAL MEDICINE

## 2023-08-15 PROCEDURE — 99999 PR PBB SHADOW E&M-EST. PATIENT-LVL IV: CPT | Mod: PBBFAC,,, | Performed by: INTERNAL MEDICINE

## 2023-08-15 PROCEDURE — 1101F PR PT FALLS ASSESS DOC 0-1 FALLS W/OUT INJ PAST YR: ICD-10-PCS | Mod: CPTII,S$GLB,, | Performed by: INTERNAL MEDICINE

## 2023-08-15 PROCEDURE — 3075F SYST BP GE 130 - 139MM HG: CPT | Mod: CPTII,S$GLB,, | Performed by: INTERNAL MEDICINE

## 2023-08-15 PROCEDURE — 3288F FALL RISK ASSESSMENT DOCD: CPT | Mod: CPTII,S$GLB,, | Performed by: INTERNAL MEDICINE

## 2023-08-15 PROCEDURE — 1125F AMNT PAIN NOTED PAIN PRSNT: CPT | Mod: CPTII,S$GLB,, | Performed by: INTERNAL MEDICINE

## 2023-08-15 PROCEDURE — 93000 ELECTROCARDIOGRAM COMPLETE: CPT | Mod: S$GLB,,, | Performed by: INTERNAL MEDICINE

## 2023-08-15 PROCEDURE — 3075F PR MOST RECENT SYSTOLIC BLOOD PRESS GE 130-139MM HG: ICD-10-PCS | Mod: CPTII,S$GLB,, | Performed by: INTERNAL MEDICINE

## 2023-08-15 PROCEDURE — 3079F DIAST BP 80-89 MM HG: CPT | Mod: CPTII,S$GLB,, | Performed by: INTERNAL MEDICINE

## 2023-08-15 PROCEDURE — 1160F RVW MEDS BY RX/DR IN RCRD: CPT | Mod: CPTII,S$GLB,, | Performed by: INTERNAL MEDICINE

## 2023-08-15 RX ORDER — MEMANTINE HYDROCHLORIDE 10 MG/1
10 TABLET ORAL 2 TIMES DAILY
COMMUNITY
Start: 2023-04-19 | End: 2024-04-03

## 2023-08-15 RX ORDER — LOSARTAN POTASSIUM 50 MG/1
TABLET ORAL
COMMUNITY
Start: 2023-07-21

## 2023-08-15 RX ORDER — ATORVASTATIN CALCIUM 80 MG/1
80 TABLET, FILM COATED ORAL
COMMUNITY
Start: 2023-07-28

## 2023-08-15 RX ORDER — PREGABALIN 25 MG/1
25 CAPSULE ORAL EVERY MORNING
COMMUNITY

## 2023-08-15 RX ORDER — PANTOPRAZOLE SODIUM 40 MG/1
40 TABLET, DELAYED RELEASE ORAL
COMMUNITY
Start: 2023-08-05

## 2023-08-15 RX ORDER — LINACLOTIDE 145 UG/1
CAPSULE, GELATIN COATED ORAL
COMMUNITY
Start: 2023-07-25 | End: 2024-04-03

## 2023-08-15 RX ORDER — ERGOCALCIFEROL 1.25 MG/1
1 CAPSULE ORAL
COMMUNITY
End: 2023-08-15

## 2023-08-15 NOTE — PROGRESS NOTES
"  Subjective:      Patient ID: Lori Pulido is a 83 y.o. female.    Chief Complaint: Follow-up    HPI:  Pt has "horrible" daily low back pain partially relieved with Lyrica    Pt has had 3 epidural steroid shots about 3 years ago and plans to see a new pain management specialist.    "Sometimes I feel full " in the anterior chest while seated.  The full feeling is mild and pt is unsure how long it lasts.    Pt has occasional sharp pains left medial breast area which lasts a secon.    Pt is limited to walking short distances by chronic low back pain.    Review of Systems   Cardiovascular:  Positive for chest pain. Negative for claudication, dyspnea on exertion, irregular heartbeat, leg swelling, near-syncope, orthopnea, palpitations and syncope.      "Legs feel like I am wearing compression stockings.    Fingers feel numb    Pt obtains relief with carpal tunnel brace.,    Past Medical History:   Diagnosis Date    Allergic rhinitis     Choledocholithiasis     DVT (deep venous thrombosis)     after TKA    GERD (gastroesophageal reflux disease)     Hemorrhoids     Hyperlipidemia     Hypertension     Neuropathy     Nicotine dependence     Stroke     Stroke 2020        Past Surgical History:   Procedure Laterality Date    APPENDECTOMY      BREAST SURGERY      lumpectomy    CARPAL TUNNEL RELEASE      CATARACT EXTRACTION BILATERAL W/ ANTERIOR VITRECTOMY       SECTION      HYSTERECTOMY      partial    LAPAROSCOPIC CHOLECYSTECTOMY WITH CHOLANGIOGRAPHY  2019    TOTAL KNEE ARTHROPLASTY      L knee       Family History   Problem Relation Age of Onset    Stroke Mother 80    Heart disease Father     Cancer Sister 89        breast cancer    Dementia Sister     Stroke Brother 68    Coronary artery disease Brother         s/p CABG    Cerebral aneurysm Other     Cerebral aneurysm Other        Social History     Socioeconomic History    Marital status:    Tobacco Use    Smoking status: Some Days     " Current packs/day: 0.00     Types: Cigarettes    Smokeless tobacco: Never    Tobacco comments:     5-6 cigarettes a day   Substance and Sexual Activity    Alcohol use: No    Drug use: Never    Sexual activity: Not Currently     Social Determinants of Health     Financial Resource Strain: Low Risk  (5/17/2022)    Overall Financial Resource Strain (CARDIA)     Difficulty of Paying Living Expenses: Not hard at all   Food Insecurity: No Food Insecurity (5/17/2022)    Hunger Vital Sign     Worried About Running Out of Food in the Last Year: Never true     Ran Out of Food in the Last Year: Never true   Transportation Needs: No Transportation Needs (5/17/2022)    PRAPARE - Transportation     Lack of Transportation (Medical): No     Lack of Transportation (Non-Medical): No   Physical Activity: Insufficiently Active (5/17/2022)    Exercise Vital Sign     Days of Exercise per Week: 5 days     Minutes of Exercise per Session: 10 min   Stress: No Stress Concern Present (5/17/2022)    [employer] - Occupational Stress Questionnaire     Feeling of Stress : Only a little   Social Connections: Moderately Isolated (5/17/2022)    Social Connection and Isolation Panel [NHANES]     Frequency of Communication with Friends and Family: More than three times a week     Frequency of Social Gatherings with Friends and Family: More than three times a week     Attends [employer] Services: More than 4 times per year     Active Member of Clubs or Organizations: No     Attends Club or Organization Meetings: Never     Marital Status:    Housing Stability: Low Risk  (5/17/2022)    Housing Stability Vital Sign     Unable to Pay for Housing in the Last Year: No     Number of Places Lived in the Last Year: 1     Unstable Housing in the Last Year: No       Current Outpatient Medications on File Prior to Visit   Medication Sig Dispense Refill    acetaminophen (TYLENOL) 325 MG tablet Take 325 mg by mouth every 6 (six)  hours as needed for Pain.      aspirin (ECOTRIN) 81 MG EC tablet Take 81 mg by mouth once daily.      atorvastatin (LIPITOR) 80 MG tablet Take 80 mg by mouth.      cholecalciferol, vitamin D3, (VITAMIN D3) 25 mcg (1,000 unit) capsule Take 1 capsule (1,000 Units total) by mouth every other day. Per  45 capsule 1    clopidogreL (PLAVIX) 75 mg tablet Take 1 tablet (75 mg total) by mouth once daily. 90 tablet 3    cyanocobalamin (VITAMIN B-12) 1000 MCG tablet Take 100 mcg by mouth once daily.      fluocinonide (LIDEX) 0.05 % external solution Apply to affected area scalp once to twice daily as needed for itching (pruritus) 60 mL 3    fluticasone propionate (CUTIVATE) 0.05 % cream AAA bid 60 g 3    ketoconazole (NIZORAL) 2 % shampoo WASH HAIR WITH MEDICATED SHAMPOO AT LEAST 2X/WEEK - LET SIT ON SCALP AT LEAST 5 MINUTES PRIOR TO RINSING 120 mL 5    LINZESS 145 mcg Cap capsule TAKE 1 CAPSULE BY MOUTH EVERY DAY 30 MINUTE BEFORE THE FIRST MEAL OF THE DAY, ON AN EMPTY STOMACH      loratadine (CLARITIN) 10 mg tablet Take by mouth.      losartan (COZAAR) 50 MG tablet TAKE 1/2 OF A TABLET (25 MG TOTAL) BY MOUTH ONCE DAILY      memantine (NAMENDA) 10 MG Tab Take 10 mg by mouth 2 (two) times daily.      mometasone (ELOCON) 0.1 % solution Apply topically once daily. To scalp 60 mL 5    pantoprazole (PROTONIX) 40 MG tablet Take 40 mg by mouth.      pregabalin (LYRICA) 25 MG capsule Take 25 mg by mouth every morning.      pregabalin (LYRICA) 75 MG capsule Take 75 mg by mouth every evening.      TAC 0.1%-ciclopirox-MOM Apply to affected area twice daily after cool blow dry 60 g 4    triamcinolone acetonide 0.1% (KENALOG) 0.1 % ointment       ursodioL (ACTIGALL) 250 mg Tab TAKE TABLET BY MOUTH THREE TIMES A DAY      [DISCONTINUED] ergocalciferol (ERGOCALCIFEROL) 50,000 unit Cap Take 1 capsule by mouth every 7 days.      [DISCONTINUED] famotidine (PEPCID) 40 MG tablet Take 40 mg by mouth once daily at 6am.       No current  "facility-administered medications on file prior to visit.       Review of patient's allergies indicates:   Allergen Reactions    Morphine      Itching/Hives    Penicillins      Objective:     Vitals:    08/15/23 0929 08/15/23 0951   BP: (!) 167/84 137/84   BP Location: Right arm Left arm   Patient Position: Sitting Sitting   BP Method: Large (Automatic)    Pulse: 73    SpO2: 97%    Weight: 66.6 kg (146 lb 11.5 oz)    Height: 5' 3" (1.6 m)         Physical Exam  Constitutional:       Appearance: She is well-developed.   Eyes:      General: No scleral icterus.  Neck:      Vascular: No carotid bruit or JVD.   Cardiovascular:      Rate and Rhythm: Normal rate and regular rhythm.      Heart sounds: No murmur heard.     No gallop.   Pulmonary:      Breath sounds: Normal breath sounds.   Musculoskeletal:      Right lower leg: No edema.      Left lower leg: No edema.   Skin:     General: Skin is warm and dry.   Neurological:      Mental Status: She is alert and oriented to person, place, and time.   Psychiatric:         Behavior: Behavior normal.         Thought Content: Thought content normal.         Judgment: Judgment normal.      ECG today: NSR WNL, reviewed by me      Lab Visit on 08/04/2023   Component Date Value Ref Range Status    WBC 08/04/2023 5.58  3.90 - 12.70 K/uL Final    RBC 08/04/2023 3.73 (L)  4.00 - 5.40 M/uL Final    Hemoglobin 08/04/2023 12.0  12.0 - 16.0 g/dL Final    Hematocrit 08/04/2023 37.5  37.0 - 48.5 % Final    MCV 08/04/2023 101 (H)  82 - 98 fL Final    MCH 08/04/2023 32.2 (H)  27.0 - 31.0 pg Final    MCHC 08/04/2023 32.0  32.0 - 36.0 g/dL Final    RDW 08/04/2023 12.8  11.5 - 14.5 % Final    Platelets 08/04/2023 163  150 - 450 K/uL Final    MPV 08/04/2023 12.4  9.2 - 12.9 fL Final    Immature Granulocytes 08/04/2023 0.2  0.0 - 0.5 % Final    Gran # (ANC) 08/04/2023 3.2  1.8 - 7.7 K/uL Final    Immature Grans (Abs) 08/04/2023 0.01  0.00 - 0.04 K/uL Final    Lymph # 08/04/2023 1.8  1.0 - 4.8 K/uL " Final    Mono # 08/04/2023 0.4  0.3 - 1.0 K/uL Final    Eos # 08/04/2023 0.2  0.0 - 0.5 K/uL Final    Baso # 08/04/2023 0.04  0.00 - 0.20 K/uL Final    nRBC 08/04/2023 0  0 /100 WBC Final    Gran % 08/04/2023 57.3  38.0 - 73.0 % Final    Lymph % 08/04/2023 31.4  18.0 - 48.0 % Final    Mono % 08/04/2023 6.8  4.0 - 15.0 % Final    Eosinophil % 08/04/2023 3.6  0.0 - 8.0 % Final    Basophil % 08/04/2023 0.7  0.0 - 1.9 % Final    Differential Method 08/04/2023 Automated   Final    Sodium 08/04/2023 142  136 - 145 mmol/L Final    Potassium 08/04/2023 4.5  3.5 - 5.1 mmol/L Final    Chloride 08/04/2023 106  95 - 110 mmol/L Final    CO2 08/04/2023 25  23 - 29 mmol/L Final    Glucose 08/04/2023 92  70 - 110 mg/dL Final    BUN 08/04/2023 16  8 - 23 mg/dL Final    Creatinine 08/04/2023 0.8  0.5 - 1.4 mg/dL Final    Calcium 08/04/2023 10.8 (H)  8.7 - 10.5 mg/dL Final    Total Protein 08/04/2023 7.4  6.0 - 8.4 g/dL Final    Albumin 08/04/2023 4.1  3.5 - 5.2 g/dL Final    Total Bilirubin 08/04/2023 0.7  0.1 - 1.0 mg/dL Final    Alkaline Phosphatase 08/04/2023 116  55 - 135 U/L Final    AST 08/04/2023 19  10 - 40 U/L Final    ALT 08/04/2023 10  10 - 44 U/L Final    eGFR 08/04/2023 >60.0  >60 mL/min/1.73 m^2 Final    Anion Gap 08/04/2023 11  8 - 16 mmol/L Final    Cholesterol 08/04/2023 124  120 - 199 mg/dL Final    Triglycerides 08/04/2023 103  30 - 150 mg/dL Final    HDL 08/04/2023 45  40 - 75 mg/dL Final    LDL Cholesterol 08/04/2023 58.4 (L)  63.0 - 159.0 mg/dL Final    HDL/Cholesterol Ratio 08/04/2023 36.3  20.0 - 50.0 % Final    Total Cholesterol/HDL Ratio 08/04/2023 2.8  2.0 - 5.0 Final    Non-HDL Cholesterol 08/04/2023 79  mg/dL Final    TSH 08/04/2023 1.374  0.400 - 4.000 uIU/mL Final   (        Assessment:     1. Essential hypertension    2. Pure hypercholesterolemia    3. Premature atrial contractions    4. PFO (patent foramen ovale)    5. Pulmonary hypertension    6. H/O deep venous thrombosis    7. Hyperparathyroidism     8. Primary osteoarthritis of right knee    9. History of CVA (cerebrovascular accident)    10. Memory impairment    11. Other forms of angina pectoris      Plan:   Lori was seen today for follow-up.    Diagnoses and all orders for this visit:    Essential hypertension  -     IN OFFICE EKG 12-LEAD (to Muse)    Pure hypercholesterolemia    Premature atrial contractions  -     IN OFFICE EKG 12-LEAD (to Muse)    PFO (patent foramen ovale)    Pulmonary hypertension    H/O deep venous thrombosis    Hyperparathyroidism    Primary osteoarthritis of right knee    History of CVA (cerebrovascular accident)  -     IN OFFICE EKG 12-LEAD (to Muse)    Memory impairment    Other forms of angina pectoris    F/u with Dr Jarrett for hyperparathyroidism    F/u with pain management    Same meds    RTC 6 months     Follow up in about 6 months (around 2/15/2024).

## 2024-03-25 ENCOUNTER — TELEPHONE (OUTPATIENT)
Dept: CARDIOLOGY | Facility: CLINIC | Age: 84
End: 2024-03-25
Payer: MEDICARE

## 2024-03-25 NOTE — TELEPHONE ENCOUNTER
Spoke with patient.   Advised her that Dr Stevens did not order any labs at her last visit.  Patient verbalized understanding.       ----- Message from Emery Crespo sent at 3/25/2024 10:21 AM CDT -----  Name of Who is Calling:QUYNH QUINTANILLA [[id number]]                   What is the request in detail:PT would l;shelby to have orders for blood work put in please assist                   Can the clinic reply by [facility]: No                   What Number to Call Back if not in [facility]: [phone]

## 2024-04-03 ENCOUNTER — OFFICE VISIT (OUTPATIENT)
Dept: CARDIOLOGY | Facility: CLINIC | Age: 84
End: 2024-04-03
Payer: MEDICARE

## 2024-04-03 VITALS
HEIGHT: 63 IN | SYSTOLIC BLOOD PRESSURE: 133 MMHG | WEIGHT: 157.5 LBS | DIASTOLIC BLOOD PRESSURE: 64 MMHG | OXYGEN SATURATION: 94 % | BODY MASS INDEX: 27.91 KG/M2 | HEART RATE: 67 BPM

## 2024-04-03 DIAGNOSIS — M48.062 SPINAL STENOSIS OF LUMBAR REGION WITH NEUROGENIC CLAUDICATION: ICD-10-CM

## 2024-04-03 DIAGNOSIS — R06.09 DYSPNEA ON EXERTION: ICD-10-CM

## 2024-04-03 DIAGNOSIS — Z86.718 H/O DEEP VENOUS THROMBOSIS: ICD-10-CM

## 2024-04-03 DIAGNOSIS — Q21.12 PFO (PATENT FORAMEN OVALE): ICD-10-CM

## 2024-04-03 DIAGNOSIS — E83.52 HYPERCALCEMIA: ICD-10-CM

## 2024-04-03 DIAGNOSIS — I10 PRIMARY HYPERTENSION: ICD-10-CM

## 2024-04-03 DIAGNOSIS — I49.1 PREMATURE ATRIAL CONTRACTIONS: ICD-10-CM

## 2024-04-03 DIAGNOSIS — G62.9 NEUROPATHY: ICD-10-CM

## 2024-04-03 DIAGNOSIS — Z86.73 HISTORY OF CVA (CEREBROVASCULAR ACCIDENT): ICD-10-CM

## 2024-04-03 DIAGNOSIS — E78.2 MIXED HYPERLIPIDEMIA: ICD-10-CM

## 2024-04-03 DIAGNOSIS — R00.2 PALPITATIONS: Primary | ICD-10-CM

## 2024-04-03 PROBLEM — I27.20 PULMONARY HYPERTENSION: Status: RESOLVED | Noted: 2019-02-12 | Resolved: 2024-04-03

## 2024-04-03 LAB
OHS QRS DURATION: 82 MS
OHS QTC CALCULATION: 403 MS

## 2024-04-03 PROCEDURE — 93000 ELECTROCARDIOGRAM COMPLETE: CPT | Mod: S$GLB,,, | Performed by: INTERNAL MEDICINE

## 2024-04-03 PROCEDURE — 1160F RVW MEDS BY RX/DR IN RCRD: CPT | Mod: CPTII,S$GLB,, | Performed by: INTERNAL MEDICINE

## 2024-04-03 PROCEDURE — 1126F AMNT PAIN NOTED NONE PRSNT: CPT | Mod: CPTII,S$GLB,, | Performed by: INTERNAL MEDICINE

## 2024-04-03 PROCEDURE — 3288F FALL RISK ASSESSMENT DOCD: CPT | Mod: CPTII,S$GLB,, | Performed by: INTERNAL MEDICINE

## 2024-04-03 PROCEDURE — 99999 PR PBB SHADOW E&M-EST. PATIENT-LVL IV: CPT | Mod: PBBFAC,,, | Performed by: INTERNAL MEDICINE

## 2024-04-03 PROCEDURE — 1159F MED LIST DOCD IN RCRD: CPT | Mod: CPTII,S$GLB,, | Performed by: INTERNAL MEDICINE

## 2024-04-03 PROCEDURE — 3075F SYST BP GE 130 - 139MM HG: CPT | Mod: CPTII,S$GLB,, | Performed by: INTERNAL MEDICINE

## 2024-04-03 PROCEDURE — 3078F DIAST BP <80 MM HG: CPT | Mod: CPTII,S$GLB,, | Performed by: INTERNAL MEDICINE

## 2024-04-03 PROCEDURE — 1101F PT FALLS ASSESS-DOCD LE1/YR: CPT | Mod: CPTII,S$GLB,, | Performed by: INTERNAL MEDICINE

## 2024-04-03 PROCEDURE — 99213 OFFICE O/P EST LOW 20 MIN: CPT | Mod: 25,S$GLB,, | Performed by: INTERNAL MEDICINE

## 2024-04-03 RX ORDER — FAMOTIDINE 40 MG/1
40 TABLET, FILM COATED ORAL
COMMUNITY
Start: 2024-02-23

## 2024-04-03 RX ORDER — CLOBETASOL PROPIONATE 0.46 MG/ML
SOLUTION TOPICAL 2 TIMES DAILY
COMMUNITY

## 2024-04-03 RX ORDER — LUBIPROSTONE 24 UG/1
1 CAPSULE ORAL 2 TIMES DAILY WITH MEALS
COMMUNITY
Start: 2023-09-14

## 2024-04-03 RX ORDER — ALENDRONATE SODIUM 70 MG/1
70 TABLET ORAL
COMMUNITY
Start: 2024-02-19

## 2024-04-03 NOTE — PROGRESS NOTES
Subjective:      Patient ID: Lori Pulido is a 84 y.o. female.    Chief Complaint: Follow-up    HPI:  Pt saw PCP yesterday, Dr Johana Howard for cough productive of purulent sputum and was prescribed an antibiotic.    Pt has gone to PT and received epidural steroid shots for chronic low back pain.    Both legs chronically feel like pt is wearing compression stockings at rest.    Review of Systems   Cardiovascular:  Positive for dyspnea on exertion (Mild, chronic). Negative for chest pain, claudication, irregular heartbeat, leg swelling, near-syncope, orthopnea, palpitations and syncope.      Pt is seeing an endocrinologist ,  Dr aPt Jarrett,  for hypercalcemia and is dx with hyperparathyroidism    Past Medical History:   Diagnosis Date    Allergic rhinitis     Choledocholithiasis     DVT (deep venous thrombosis)     after TKA    GERD (gastroesophageal reflux disease)     Hemorrhoids     Hyperlipidemia     Hypertension     Neuropathy     Nicotine dependence     Stroke     Stroke 2020        Past Surgical History:   Procedure Laterality Date    APPENDECTOMY      BREAST SURGERY      lumpectomy    CARPAL TUNNEL RELEASE      CATARACT EXTRACTION BILATERAL W/ ANTERIOR VITRECTOMY       SECTION      HYSTERECTOMY      partial    LAPAROSCOPIC CHOLECYSTECTOMY WITH CHOLANGIOGRAPHY  2019    TOTAL KNEE ARTHROPLASTY      L knee       Family History   Problem Relation Age of Onset    Stroke Mother 80    Heart disease Father     Cancer Sister 89        breast cancer    Dementia Sister     Stroke Brother 68    Coronary artery disease Brother         s/p CABG    Cerebral aneurysm Other     Cerebral aneurysm Other        Social History     Socioeconomic History    Marital status:    Tobacco Use    Smoking status: Some Days     Types: Cigarettes    Smokeless tobacco: Never    Tobacco comments:     5-6 cigarettes a day   Substance and Sexual Activity    Alcohol use: No    Drug use: Never    Sexual  activity: Not Currently     Social Determinants of Health     Financial Resource Strain: Low Risk  (5/17/2022)    Overall Financial Resource Strain (CARDIA)     Difficulty of Paying Living Expenses: Not hard at all   Food Insecurity: No Food Insecurity (5/17/2022)    Hunger Vital Sign     Worried About Running Out of Food in the Last Year: Never true     Ran Out of Food in the Last Year: Never true   Transportation Needs: No Transportation Needs (5/17/2022)    PRAPARE - Transportation     Lack of Transportation (Medical): No     Lack of Transportation (Non-Medical): No   Physical Activity: Insufficiently Active (5/17/2022)    Exercise Vital Sign     Days of Exercise per Week: 5 days     Minutes of Exercise per Session: 10 min   Stress: No Stress Concern Present (5/17/2022)    [employer] - Occupational Stress Questionnaire     Feeling of Stress : Only a little   Social Connections: Moderately Isolated (5/17/2022)    Social Connection and Isolation Panel [NHANES]     Frequency of Communication with Friends and Family: More than three times a week     Frequency of Social Gatherings with Friends and Family: More than three times a week     Attends [employer] Services: More than 4 times per year     Active Member of Clubs or Organizations: No     Attends Club or Organization Meetings: Never     Marital Status:    Housing Stability: Low Risk  (5/17/2022)    Housing Stability Vital Sign     Unable to Pay for Housing in the Last Year: No     Number of Places Lived in the Last Year: 1     Unstable Housing in the Last Year: No       Current Outpatient Medications on File Prior to Visit   Medication Sig Dispense Refill    acetaminophen (TYLENOL) 325 MG tablet Take 325 mg by mouth every 6 (six) hours as needed for Pain.      alendronate (FOSAMAX) 70 MG tablet Take 70 mg by mouth every 7 days.      aspirin (ECOTRIN) 81 MG EC tablet Take 81 mg by mouth once daily.      atorvastatin (LIPITOR) 80  MG tablet Take 80 mg by mouth.      cholecalciferol, vitamin D3, (VITAMIN D3) 25 mcg (1,000 unit) capsule Take 1 capsule (1,000 Units total) by mouth every other day. Per  45 capsule 1    clobetasoL (TEMOVATE) 0.05 % external solution Apply topically 2 (two) times daily.      clopidogreL (PLAVIX) 75 mg tablet Take 1 tablet (75 mg total) by mouth once daily. 90 tablet 3    cyanocobalamin (VITAMIN B-12) 1000 MCG tablet Take 100 mcg by mouth once daily.      famotidine (PEPCID) 40 MG tablet Take 40 mg by mouth.      fluocinonide (LIDEX) 0.05 % external solution Apply to affected area scalp once to twice daily as needed for itching (pruritus) 60 mL 3    fluticasone propionate (CUTIVATE) 0.05 % cream AAA bid 60 g 3    ketoconazole (NIZORAL) 2 % shampoo WASH HAIR WITH MEDICATED SHAMPOO AT LEAST 2X/WEEK - LET SIT ON SCALP AT LEAST 5 MINUTES PRIOR TO RINSING 120 mL 5    loratadine (CLARITIN) 10 mg tablet Take by mouth.      losartan (COZAAR) 50 MG tablet TAKE 1/2 OF A TABLET (25 MG TOTAL) BY MOUTH ONCE DAILY      lubiprostone (AMITIZA) 24 MCG Cap Take 1 capsule by mouth 2 (two) times daily with meals.      mometasone (ELOCON) 0.1 % solution Apply topically once daily. To scalp 60 mL 5    pantoprazole (PROTONIX) 40 MG tablet Take 40 mg by mouth.      pregabalin (LYRICA) 25 MG capsule Take 25 mg by mouth every morning.      pregabalin (LYRICA) 75 MG capsule Take 75 mg by mouth every evening.      TAC 0.1%-ciclopirox-MOM Apply to affected area twice daily after cool blow dry 60 g 4    triamcinolone acetonide 0.1% (KENALOG) 0.1 % ointment       ursodioL (ACTIGALL) 250 mg Tab TAKE TABLET BY MOUTH THREE TIMES A DAY      [DISCONTINUED] LINZESS 145 mcg Cap capsule TAKE 1 CAPSULE BY MOUTH EVERY DAY 30 MINUTE BEFORE THE FIRST MEAL OF THE DAY, ON AN EMPTY STOMACH      [DISCONTINUED] memantine (NAMENDA) 10 MG Tab Take 10 mg by mouth 2 (two) times daily.       No current facility-administered medications on file prior to  "visit.       Review of patient's allergies indicates:   Allergen Reactions    Morphine      Itching/Hives    Penicillins      Objective:     Vitals:    04/03/24 0847   BP: 133/64   BP Location: Right arm   Patient Position: Sitting   BP Method: Large (Automatic)   Pulse: 67   SpO2: (!) 94%   Weight: 71.5 kg (157 lb 8.3 oz)   Height: 5' 3" (1.6 m)        Physical Exam  Constitutional:       Appearance: She is well-developed.   Eyes:      General: No scleral icterus.  Neck:      Vascular: No carotid bruit or JVD.   Cardiovascular:      Rate and Rhythm: Normal rate and regular rhythm.      Heart sounds: No murmur heard.     No gallop.   Pulmonary:      Breath sounds: Normal breath sounds.   Musculoskeletal:      Right lower leg: No edema.      Left lower leg: No edema.   Skin:     General: Skin is warm and dry.   Neurological:      Mental Status: She is alert and oriented to person, place, and time.   Psychiatric:         Behavior: Behavior normal.         Thought Content: Thought content normal.         Judgment: Judgment normal.      ECG today reviewed by me:  NSR, WNL    No visits with results within 6 Month(s) from this visit.   Latest known visit with results is:   Lab Visit on 08/04/2023   Component Date Value Ref Range Status    WBC 08/04/2023 5.58  3.90 - 12.70 K/uL Final    RBC 08/04/2023 3.73 (L)  4.00 - 5.40 M/uL Final    Hemoglobin 08/04/2023 12.0  12.0 - 16.0 g/dL Final    Hematocrit 08/04/2023 37.5  37.0 - 48.5 % Final    MCV 08/04/2023 101 (H)  82 - 98 fL Final    MCH 08/04/2023 32.2 (H)  27.0 - 31.0 pg Final    MCHC 08/04/2023 32.0  32.0 - 36.0 g/dL Final    RDW 08/04/2023 12.8  11.5 - 14.5 % Final    Platelets 08/04/2023 163  150 - 450 K/uL Final    MPV 08/04/2023 12.4  9.2 - 12.9 fL Final    Immature Granulocytes 08/04/2023 0.2  0.0 - 0.5 % Final    Gran # (ANC) 08/04/2023 3.2  1.8 - 7.7 K/uL Final    Immature Grans (Abs) 08/04/2023 0.01  0.00 - 0.04 K/uL Final    Lymph # 08/04/2023 1.8  1.0 - 4.8 " "K/uL Final    Mono # 08/04/2023 0.4  0.3 - 1.0 K/uL Final    Eos # 08/04/2023 0.2  0.0 - 0.5 K/uL Final    Baso # 08/04/2023 0.04  0.00 - 0.20 K/uL Final    nRBC 08/04/2023 0  0 /100 WBC Final    Gran % 08/04/2023 57.3  38.0 - 73.0 % Final    Lymph % 08/04/2023 31.4  18.0 - 48.0 % Final    Mono % 08/04/2023 6.8  4.0 - 15.0 % Final    Eosinophil % 08/04/2023 3.6  0.0 - 8.0 % Final    Basophil % 08/04/2023 0.7  0.0 - 1.9 % Final    Differential Method 08/04/2023 Automated   Final    Sodium 08/04/2023 142  136 - 145 mmol/L Final    Potassium 08/04/2023 4.5  3.5 - 5.1 mmol/L Final    Chloride 08/04/2023 106  95 - 110 mmol/L Final    CO2 08/04/2023 25  23 - 29 mmol/L Final    Glucose 08/04/2023 92  70 - 110 mg/dL Final    BUN 08/04/2023 16  8 - 23 mg/dL Final    Creatinine 08/04/2023 0.8  0.5 - 1.4 mg/dL Final    Calcium 08/04/2023 10.8 (H)  8.7 - 10.5 mg/dL Final    Total Protein 08/04/2023 7.4  6.0 - 8.4 g/dL Final    Albumin 08/04/2023 4.1  3.5 - 5.2 g/dL Final    Total Bilirubin 08/04/2023 0.7  0.1 - 1.0 mg/dL Final    Alkaline Phosphatase 08/04/2023 116  55 - 135 U/L Final    AST 08/04/2023 19  10 - 40 U/L Final    ALT 08/04/2023 10  10 - 44 U/L Final    eGFR 08/04/2023 >60.0  >60 mL/min/1.73 m^2 Final    Anion Gap 08/04/2023 11  8 - 16 mmol/L Final    Cholesterol 08/04/2023 124  120 - 199 mg/dL Final    Triglycerides 08/04/2023 103  30 - 150 mg/dL Final    HDL 08/04/2023 45  40 - 75 mg/dL Final    LDL Cholesterol 08/04/2023 58.4 (L)  63.0 - 159.0 mg/dL Final    HDL/Cholesterol Ratio 08/04/2023 36.3  20.0 - 50.0 % Final    Total Cholesterol/HDL Ratio 08/04/2023 2.8  2.0 - 5.0 Final    Non-HDL Cholesterol 08/04/2023 79  mg/dL Final    TSH 08/04/2023 1.374  0.400 - 4.000 uIU/mL Final   (  Transthoracic echo (TTE) complete    Height:  5' 3" (1.6 m)   Weight:  75.8 kg (167 lb)   Blood Pressure:  140/80    Date of Study:  5/28/21   Ordering Provider:  Remigio Lewis MD   Clinical Indications:  PFO (patent foramen " ovale) [Q21.1 (ICD-10-CM)]       Interpreting Physicians  Performing Staff   Drake Fam MD Tech:  Filiberto Cortez   Support Staff:  Elizabeth Joy RN        Reason for Exam  Priority: Routine  Dx: PFO (patent foramen ovale) [Q21.12 (ICD-10-CM)]     Result Image Hyperlink     Show images for Echo Color Flow Doppler? Yes; Bubble Contrast? Yes  Summary    The left ventricle is normal in size with normal systolic function.  The estimated ejection fraction is 63%.  Normal left ventricular diastolic function.  Normal right ventricular size with normal right ventricular systolic function.  There is a patent foramen ovale present with right to left shunting indicated by saline contrast.  Moderate right atrial enlargement.  Mild to moderate tricuspid regurgitation.  Mild pulmonic regurgitation.  Normal central venous pressure (3 mmHg).  The estimated PA systolic pressure is 28 mmHg.       Assessment:     1. Palpitations    2. Mixed hyperlipidemia    3. Primary hypertension    4. PFO (patent foramen ovale)    5. Premature atrial contractions    6. H/O deep venous thrombosis    7. History of CVA (cerebrovascular accident)    8. Neuropathy    9. Spinal stenosis of lumbar region with neurogenic claudication    10. Hypercalcemia    11. Dyspnea on exertion      Plan:   Lori was seen today for follow-up.    Diagnoses and all orders for this visit:    Palpitations    Mixed hyperlipidemia    Primary hypertension    PFO (patent foramen ovale)    Premature atrial contractions    H/O deep venous thrombosis    History of CVA (cerebrovascular accident)    Neuropathy    Spinal stenosis of lumbar region with neurogenic claudication    Hypercalcemia    Dyspnea on exertion     Pt is doing well    Same meds    Note there is no hx of atrial fibrillation    Note pt has seen Dr Lewis and declined PFO closure    Follow up in about 6 months (around 10/3/2024).

## 2024-06-04 ENCOUNTER — TELEPHONE (OUTPATIENT)
Dept: DERMATOLOGY | Facility: CLINIC | Age: 84
End: 2024-06-04
Payer: MEDICARE

## 2024-06-04 NOTE — TELEPHONE ENCOUNTER
Pt scheduled to come in July 3rd.      Elvia         ----- Message from Shelby Berger LPN sent at 5/30/2024  9:33 AM CDT -----  Regarding: FW: Pt called states she has warts thats contagious on hand needs sooner appt  Contact: [phone]    ----- Message -----  From: Constance Grijalva  Sent: 5/30/2024   9:13 AM CDT  To: [facility] D[facility] Clinical Support Triage  Subject: Pt called states she has warts thats contagi#    Name of Who is Calling:QUYNH QUINTANILLA [[id number]]        What is the request in detail:Pt called states she has warts thats contagious on hand needs sooner appt.  Please advise         Can the clinic reply by [facility]:No        What Number [facility]: Telephone Information:  Mobile          [phone]

## 2024-06-11 ENCOUNTER — OFFICE VISIT (OUTPATIENT)
Dept: URGENT CARE | Facility: CLINIC | Age: 84
End: 2024-06-11
Payer: MEDICARE

## 2024-06-11 VITALS
TEMPERATURE: 99 F | HEIGHT: 63 IN | SYSTOLIC BLOOD PRESSURE: 155 MMHG | OXYGEN SATURATION: 97 % | WEIGHT: 157 LBS | BODY MASS INDEX: 27.82 KG/M2 | RESPIRATION RATE: 17 BRPM | HEART RATE: 68 BPM | DIASTOLIC BLOOD PRESSURE: 87 MMHG

## 2024-06-11 DIAGNOSIS — J30.2 SEASONAL ALLERGIC RHINITIS, UNSPECIFIED TRIGGER: ICD-10-CM

## 2024-06-11 DIAGNOSIS — Z20.822 EXPOSURE TO COVID-19 VIRUS: Primary | ICD-10-CM

## 2024-06-11 LAB
CTP QC/QA: YES
SARS-COV-2 AG RESP QL IA.RAPID: NEGATIVE

## 2024-06-11 PROCEDURE — 99213 OFFICE O/P EST LOW 20 MIN: CPT | Mod: S$GLB,,, | Performed by: FAMILY MEDICINE

## 2024-06-11 PROCEDURE — 87811 SARS-COV-2 COVID19 W/OPTIC: CPT | Mod: QW,S$GLB,, | Performed by: FAMILY MEDICINE

## 2024-06-11 RX ORDER — AZELASTINE 1 MG/ML
1 SPRAY, METERED NASAL 2 TIMES DAILY
Qty: 30 ML | Refills: 0 | Status: SHIPPED | OUTPATIENT
Start: 2024-06-11 | End: 2024-09-29

## 2024-06-11 NOTE — PROGRESS NOTES
"Subjective:      Patient ID: Lori Pulido is a 84 y.o. female.    Vitals:  height is 5' 3" (1.6 m) and weight is 71.2 kg (157 lb). Her temporal temperature is 98.7 °F (37.1 °C). Her blood pressure is 155/87 (abnormal) and her pulse is 68. Her respiration is 17 and oxygen saturation is 97%.     Chief Complaint: Cold Exposure (With exposure to COVID. )    Patient is an 84 y.o. female with the compliant of hot and cold symptoms that presented about 2 nights ago, she reports with not taking anything to help combat her symptoms as well she reports with receiving a call from her daughter whom tested positive for COVID on today.     Pt is an 85 yo F who presents with COVID exposure.  She reports that her daughter who lives with her tested positive for COVID yesterday.  Patient says that about 2 days ago she has been going back and forth from hot to cold over night.  She wakes up sweating sometimes.  Has not had a fever.  Patient reports that she has had runny nose and watery eyes since the summer started but patient attributes this to her usual summer allergy symptoms    URI   This is a new problem. The current episode started in the past 7 days. The problem has been unchanged. The cough is Productive of sputum. Associated symptoms include congestion, coughing, neck pain ("related to mattress") and sneezing. Pertinent negatives include no abdominal pain, chest pain, conjunctivitis, diarrhea, dysuria, ear pain, headaches, joint pain, joint swelling, nausea, plugged ear sensation, rash, rhinorrhea, sinus pain, sore throat, swollen glands, vomiting or wheezing. Associated symptoms comments: Sweats and chills. . She has tried nothing for the symptoms.       HENT:  Positive for congestion. Negative for ear pain, sinus pain and sore throat.    Neck: Positive for neck pain ("related to mattress").   Cardiovascular:  Negative for chest pain.   Respiratory:  Positive for cough. Negative for wheezing.    Gastrointestinal:  " Negative for abdominal pain, nausea, vomiting and diarrhea.   Genitourinary:  Negative for dysuria.   Skin:  Negative for rash.   Allergic/Immunologic: Positive for sneezing.   Neurological:  Negative for headaches.      Objective:     Physical Exam   Constitutional: She is oriented to person, place, and time. She appears well-developed. She is cooperative.  Non-toxic appearance. She does not appear ill. No distress.   HENT:   Head: Normocephalic and atraumatic.   Ears:   Right Ear: Hearing, tympanic membrane, external ear and ear canal normal.   Left Ear: Hearing, tympanic membrane, external ear and ear canal normal.   Nose: Nose normal. No mucosal edema, rhinorrhea or nasal deformity. No epistaxis. Right sinus exhibits no maxillary sinus tenderness and no frontal sinus tenderness. Left sinus exhibits no maxillary sinus tenderness and no frontal sinus tenderness.   Mouth/Throat: Uvula is midline, oropharynx is clear and moist and mucous membranes are normal. No trismus in the jaw. Normal dentition. No uvula swelling. No oropharyngeal exudate, posterior oropharyngeal edema or posterior oropharyngeal erythema.   Eyes: Conjunctivae and lids are normal. No scleral icterus.   Neck: Trachea normal and phonation normal. Neck supple. No edema present. No erythema present. No neck rigidity present.   Cardiovascular: Normal rate, regular rhythm, normal heart sounds and normal pulses.   Pulmonary/Chest: Effort normal and breath sounds normal. No respiratory distress. She has no decreased breath sounds. She has no rhonchi.   Abdominal: Normal appearance.   Musculoskeletal: Normal range of motion.         General: No deformity. Normal range of motion.   Neurological: She is alert and oriented to person, place, and time. She exhibits normal muscle tone. Coordination normal.   Skin: Skin is warm, dry, intact, not diaphoretic and not pale.   Psychiatric: Her speech is normal and behavior is normal. Judgment and thought content  normal.   Nursing note and vitals reviewed.    Results for orders placed or performed in visit on 06/11/24   SARS Coronavirus 2 Antigen, POCT Manual Read   Result Value Ref Range    SARS Coronavirus 2 Antigen Negative Negative     Acceptable Yes        Assessment:     1. Exposure to COVID-19 virus    2. Seasonal allergic rhinitis, unspecified trigger        Plan:       Exposure to COVID-19 virus  -     SARS Coronavirus 2 Antigen, POCT Manual Read    Seasonal allergic rhinitis, unspecified trigger  -     azelastine (ASTELIN) 137 mcg (0.1 %) nasal spray; 1 spray (137 mcg total) by Nasal route 2 (two) times daily.  Dispense: 30 mL; Refill: 0             Patient Instructions   Repeat COVID testing in 2 days to confirm negative test    For seasonal allergies:  Continue Claritin daily  Astelin nasal spray (prescription)  Nasal saline    Follow-up with PCP      You must understand that you have received treatment at an Urgent Care facility only, and that you may be  released before all of your medical problems are known or treated. Urgent Care facilities are not equipped to  handle life threatening emergencies. It is recommended that you seek care at an Emergency Department for  further evaluation of worsening or concerning symptoms, or possibly life threatening conditions as  discussed.      If you develop chest pain, shortness of breath, throat swelling, tongue swelling, lightheadedness or any other causes for concern, proceed to ER.

## 2024-06-11 NOTE — PATIENT INSTRUCTIONS
Repeat COVID testing in 2 days to confirm negative test    For seasonal allergies:  Continue Claritin daily  Astelin nasal spray (prescription)  Nasal saline    Follow-up with PCP      You must understand that you have received treatment at an Urgent Care facility only, and that you may be  released before all of your medical problems are known or treated. Urgent Care facilities are not equipped to  handle life threatening emergencies. It is recommended that you seek care at an Emergency Department for  further evaluation of worsening or concerning symptoms, or possibly life threatening conditions as  discussed.      If you develop chest pain, shortness of breath, throat swelling, tongue swelling, lightheadedness or any other causes for concern, proceed to ER.

## 2024-07-03 ENCOUNTER — OFFICE VISIT (OUTPATIENT)
Dept: DERMATOLOGY | Facility: CLINIC | Age: 84
End: 2024-07-03
Payer: MEDICARE

## 2024-07-03 DIAGNOSIS — L30.4 INTERTRIGO: ICD-10-CM

## 2024-07-03 DIAGNOSIS — L82.1 SK (SEBORRHEIC KERATOSIS): ICD-10-CM

## 2024-07-03 DIAGNOSIS — L60.3 ONYCHODYSTROPHY: ICD-10-CM

## 2024-07-03 DIAGNOSIS — B07.9 VIRAL WARTS, UNSPECIFIED TYPE: Primary | ICD-10-CM

## 2024-07-03 DIAGNOSIS — L29.9 ITCH: ICD-10-CM

## 2024-07-03 PROCEDURE — 1126F AMNT PAIN NOTED NONE PRSNT: CPT | Mod: CPTII,S$GLB,, | Performed by: DERMATOLOGY

## 2024-07-03 PROCEDURE — 17110 DESTRUCTION B9 LES UP TO 14: CPT | Mod: S$GLB,,, | Performed by: DERMATOLOGY

## 2024-07-03 PROCEDURE — 1160F RVW MEDS BY RX/DR IN RCRD: CPT | Mod: CPTII,S$GLB,, | Performed by: DERMATOLOGY

## 2024-07-03 PROCEDURE — 1101F PT FALLS ASSESS-DOCD LE1/YR: CPT | Mod: CPTII,S$GLB,, | Performed by: DERMATOLOGY

## 2024-07-03 PROCEDURE — 99214 OFFICE O/P EST MOD 30 MIN: CPT | Mod: 25,S$GLB,, | Performed by: DERMATOLOGY

## 2024-07-03 PROCEDURE — 99999 PR PBB SHADOW E&M-EST. PATIENT-LVL III: CPT | Mod: PBBFAC,,, | Performed by: DERMATOLOGY

## 2024-07-03 PROCEDURE — 3288F FALL RISK ASSESSMENT DOCD: CPT | Mod: CPTII,S$GLB,, | Performed by: DERMATOLOGY

## 2024-07-03 PROCEDURE — 1159F MED LIST DOCD IN RCRD: CPT | Mod: CPTII,S$GLB,, | Performed by: DERMATOLOGY

## 2024-07-03 RX ORDER — AMMONIUM LACTATE 12 G/100G
CREAM TOPICAL
Qty: 385 G | Refills: 10 | Status: SHIPPED | OUTPATIENT
Start: 2024-07-03

## 2024-07-03 RX ORDER — KETOCONAZOLE 20 MG/G
CREAM TOPICAL 2 TIMES DAILY
Qty: 60 G | Refills: 6 | Status: SHIPPED | OUTPATIENT
Start: 2024-07-03

## 2024-07-03 NOTE — PROGRESS NOTES
Subjective:      Patient ID:  Lori Pulido is a 84 y.o. female who presents for   Chief Complaint   Patient presents with    Itching    Lesion     HPI    Pt c/o itchy scalp x a few years. Tx with ketoconazole shampoo, Nizoral shampoo and clobetasol solution. Tx helps.  Pt c/o itchy skin on right arm x a few months. Tx with TAC cream. Tx helps.  Pt c/o lesions on left hand and right wrist x a few months. No prev tx.      Review of Systems   Constitutional:  Negative for fever, chills, weight loss, weight gain, fatigue, night sweats and malaise.   Hematologic/Lymphatic: Bruises/bleeds easily.       Objective:   Physical Exam   Constitutional: She appears well-developed and well-nourished.   Neurological: She is alert and oriented to person, place, and time.   Psychiatric: She has a normal mood and affect.   Skin:   Areas Examined (abnormalities noted in diagram):   Head / Face Inspection Performed  Abdomen Inspection Performed  Back Inspection Performed  Nails and Digits Inspection Performed                     Diagram Legend     Erythematous scaling macule/papule c/w actinic keratosis       Vascular papule c/w angioma      Pigmented verrucoid papule/plaque c/w seborrheic keratosis      Yellow umbilicated papule c/w sebaceous hyperplasia      Irregularly shaped tan macule c/w lentigo     1-2 mm smooth white papules consistent with Milia      Movable subcutaneous cyst with punctum c/w epidermal inclusion cyst      Subcutaneous movable cyst c/w pilar cyst      Firm pink to brown papule c/w dermatofibroma      Pedunculated fleshy papule(s) c/w skin tag(s)      Evenly pigmented macule c/w junctional nevus     Mildly variegated pigmented, slightly irregular-bordered macule c/w mildly atypical nevus      Flesh colored to evenly pigmented papule c/w intradermal nevus       Pink pearly papule/plaque c/w basal cell carcinoma      Erythematous hyperkeratotic cursted plaque c/w SCC      Surgical scar with no sign of skin  cancer recurrence      Open and closed comedones      Inflammatory papules and pustules      Verrucoid papule consistent consistent with wart     Erythematous eczematous patches and plaques     Dystrophic onycholytic nail with subungual debris c/w onychomycosis     Umbilicated papule    Erythematous-base heme-crusted tan verrucoid plaque consistent with inflamed seborrheic keratosis     Erythematous Silvery Scaling Plaque c/w Psoriasis     See annotation  F nails with long ridging.      Assessment / Plan:        Viral warts, unspecified type  Discussed with patient the etiology and pathogenesis of the disease or skin lesion(s) and possible treatments and aggravators.    Reviewed with patient different treatment options and associated risks.  Cryosurgery procedure note:    Verbal consent from the patient is obtained including, but not limited to, risk of hypopigmentation/hyperpigmentation, scar, recurrence of lesion. Liquid nitrogen cryosurgery is applied to 2 verruca with prior paring, as detailed in the physical exam, to produce a freeze injury. 3 consecutive freeze thaw cycles are applied to each verruca. The patient is aware that blisters (possibly blood blisters) may form.    SK (seborrheic keratosis)  -     ammonium lactate 12 % Crea; Bid to bad nails and to dry skin bumps  Dispense: 385 g; Refill: 10  Discussed with patient the benign nature of these lesions and that no treatment is indicated.  Chronic nature of this condition discussed with patient.  Back applicator for creams, lotions, topical medications discussed.  Can try plastic mixing spoons or spatulas also or soft shower brushes.    Intertrigo  -     ketoconazole (NIZORAL) 2 % cream; Apply topically 2 (two) times daily. Prn body fold irritation  Dispense: 60 g; Refill: 6  Reviewed with patient different treatment options and associated risks.  Educated patient about keeping body folds free of sweat with routine wiping and regular cornstarch usage in  addition to prescription therapy.    Itch  Pt reports mild.  Patient and or guardian to monitor this area/lesion or these areas/lesions for changes or worsening or darkening (for moles and freckles).  Patient and or guardian to contact us if any changes are noted for such.  Can do lab weeks prn.    Onychodystrophy  -     ammonium lactate 12 % Crea; Bid to bad nails and to dry skin bumps  Dispense: 385 g; Refill: 10  Ridging.  Discussed with patient the etiology and pathogenesis of the disease or skin lesion(s) and possible treatments and aggravators.    Chronic nature of this condition discussed with patient.  Reviewed with patient different treatment options and associated risks.  Proper application of medications and or care for affected area(s) and condition(s) reviewed.  Patient instructed to start Amlactin cream or lotion nightly to AK prone areas or other specified affected areas.  Warned of skin irritation and to decrease frequency of usage if this occurs.  Chronic nature of this condition discussed with patient.  Discussed with patient the benign nature of these lesions and that no treatment is indicated.  Previous [facility] labs and or records and notes reviewed and considered for their impact on our clinical decision making today.             Follow up if symptoms worsen or fail to improve.

## 2024-07-03 NOTE — PATIENT INSTRUCTIONS

## 2024-09-04 DIAGNOSIS — I67.9 CEREBROVASCULAR DISEASE: ICD-10-CM

## 2024-09-04 RX ORDER — CLOPIDOGREL BISULFATE 75 MG/1
75 TABLET ORAL DAILY
Qty: 90 TABLET | Refills: 0 | Status: SHIPPED | OUTPATIENT
Start: 2024-09-04

## 2024-10-11 ENCOUNTER — OFFICE VISIT (OUTPATIENT)
Dept: CARDIOLOGY | Facility: CLINIC | Age: 84
End: 2024-10-11
Payer: MEDICARE

## 2024-10-11 DIAGNOSIS — E78.00 HYPERCHOLESTEROLEMIA: ICD-10-CM

## 2024-10-11 DIAGNOSIS — I10 PRIMARY HYPERTENSION: Primary | ICD-10-CM

## 2024-10-11 PROBLEM — I20.89 OTHER FORMS OF ANGINA PECTORIS: Status: RESOLVED | Noted: 2023-08-15 | Resolved: 2024-10-11

## 2024-10-11 PROBLEM — I48.0 PAROXYSMAL ATRIAL FIBRILLATION: Status: RESOLVED | Noted: 2023-08-15 | Resolved: 2024-10-11

## 2024-10-11 PROCEDURE — 99999 PR PBB SHADOW E&M-EST. PATIENT-LVL V: CPT | Mod: PBBFAC,,, | Performed by: INTERNAL MEDICINE

## 2024-10-11 NOTE — PROGRESS NOTES
[facility]    Chief Complaint  Chief Complaint   Patient presents with    Follow-up       HPI:    Presents to establish follow-up as the half-way of Dr. Stevens.  Has a history of a cryptogenic stroke.  PFO has been managed with clopidogrel and aspirin.  Active without exertional dyspnea or chest discomfort.  No palpitations, syncope, or presyncope.    Medications  Current Outpatient Medications   Medication Sig Dispense Refill    acetaminophen (TYLENOL) 325 MG tablet Take 325 mg by mouth every 6 (six) hours as needed for Pain.      alendronate (FOSAMAX) 70 MG tablet Take 70 mg by mouth every 7 days.      ammonium lactate 12 % Crea Bid to bad nails and to dry skin bumps 385 g 10    aspirin (ECOTRIN) 81 MG EC tablet Take 81 mg by mouth once daily.      atorvastatin (LIPITOR) 80 MG tablet Take 80 mg by mouth.      cholecalciferol, vitamin D3, (VITAMIN D3) 25 mcg (1,000 unit) capsule Take 1 capsule (1,000 Units total) by mouth every other day. Per  45 capsule 1    clobetasoL (TEMOVATE) 0.05 % external solution Apply topically 2 (two) times daily.      clopidogreL (PLAVIX) 75 mg tablet Take 1 tablet (75 mg total) by mouth once daily. 90 tablet 0    cyanocobalamin (VITAMIN B-12) 1000 MCG tablet Take 100 mcg by mouth once daily.      famotidine (PEPCID) 40 MG tablet Take 40 mg by mouth.      fluocinonide (LIDEX) 0.05 % external solution Apply to affected area scalp once to twice daily as needed for itching (pruritus) 60 mL 3    fluticasone propionate (CUTIVATE) 0.05 % cream AAA bid 60 g 3    ketoconazole (NIZORAL) 2 % cream Apply topically 2 (two) times daily. Prn body fold irritation 60 g 6    ketoconazole (NIZORAL) 2 % shampoo WASH HAIR WITH MEDICATED SHAMPOO AT LEAST 2X/WEEK - LET SIT ON SCALP AT LEAST 5 MINUTES PRIOR TO RINSING 120 mL 5    loratadine (CLARITIN) 10 mg tablet Take by mouth.      losartan (COZAAR) 50 MG tablet TAKE 1/2 OF A TABLET (25 MG TOTAL) BY MOUTH ONCE DAILY       lubiprostone (AMITIZA) 24 MCG Cap Take 1 capsule by mouth 2 (two) times daily with meals.      mometasone (ELOCON) 0.1 % solution Apply topically once daily. To scalp 60 mL 5    pantoprazole (PROTONIX) 40 MG tablet Take 40 mg by mouth.      pregabalin (LYRICA) 25 MG capsule Take 25 mg by mouth every morning.      pregabalin (LYRICA) 75 MG capsule Take 75 mg by mouth every evening.      TAC 0.1%-ciclopirox-MOM Apply to affected area twice daily after cool blow dry 60 g 4    triamcinolone acetonide 0.1% (KENALOG) 0.1 % ointment       ursodioL (ACTIGALL) 250 mg Tab TAKE TABLET BY MOUTH THREE TIMES A DAY      azelastine (ASTELIN) 137 mcg (0.1 %) nasal spray 1 spray (137 mcg total) by Nasal route 2 (two) times daily. 30 mL 0     No current facility-administered medications for this visit.        History  Past Medical History:   Diagnosis Date    Allergic rhinitis     Choledocholithiasis     DVT (deep venous thrombosis)     after TKA    GERD (gastroesophageal reflux disease)     Hemorrhoids     Hyperlipidemia     Hypertension     Neuropathy     Nicotine dependence     Stroke     Stroke 2020     Past Surgical History:   Procedure Laterality Date    APPENDECTOMY      BREAST SURGERY      lumpectomy    CARPAL TUNNEL RELEASE      CATARACT EXTRACTION BILATERAL W/ ANTERIOR VITRECTOMY       SECTION      HYSTERECTOMY      partial    LAPAROSCOPIC CHOLECYSTECTOMY WITH CHOLANGIOGRAPHY  2019    TOTAL KNEE ARTHROPLASTY      L knee     Social History     Socioeconomic History    Marital status:    Tobacco Use    Smoking status: Some Days     Types: Cigarettes     Passive exposure: Current    Smokeless tobacco: Never    Tobacco comments:     5-6 cigarettes a day   Substance and Sexual Activity    Alcohol use: No    Drug use: Never    Sexual activity: Not Currently     Social Drivers of Health     Financial Resource Strain: Low Risk  (5/10/2023)    Received from [facility], [facility]    Overall Financial  Resource Strain (CARDIA)     Difficulty of Paying Living Expenses: Not hard at all   Food Insecurity: No Food Insecurity (5/10/2023)    [place] [facility]    Hunger Vital Sign     Worried About Running Out of Food in the Last Year: Never true     Ran Out of Food in the Last Year: Never true   Transportation Needs: No Transportation Needs (5/10/2023)    [place] [facility]    PRAPARE - Transportation     Lack of Transportation (Medical): No     Lack of Transportation (Non-Medical): No   Physical Activity: Insufficiently Active (5/17/2022)    Exercise Vital Sign     Days of Exercise per Week: 5 days     Minutes of Exercise per Session: 10 min   Stress: No Stress Concern Present (5/17/2022)    [employer] [place] - Occupational Stress Questionnaire     Feeling of Stress : Only a little   Housing Stability: Low Risk  (5/10/2023)    [place] [facility], [facility]    Housing Stability Vital Sign     Unable to Pay for Housing in the Last Year: No     Number of Places Lived in the Last Year: 1     Unstable Housing in the Last Year: No     Family History   Problem Relation Name Age of Onset    Stroke Mother  80    Heart disease Father      Cancer Sister x2 89        breast cancer    Dementia Sister x3     Stroke Brother x1 68    Coronary artery disease Brother x3         s/p CABG    Cerebral aneurysm Other neice     Cerebral aneurysm Other nephew         Allergies  Review of patient's allergies indicates:   Allergen Reactions    Morphine      Itching/Hives    Penicillins        Review of Systems   Review of Systems   Constitutional: Negative for malaise/fatigue, weight gain and weight loss.   Eyes:  Negative for visual disturbance.   Cardiovascular:  Negative for chest pain, claudication, cyanosis, dyspnea on exertion, irregular heartbeat, leg swelling, near-syncope, orthopnea, palpitations, paroxysmal nocturnal dyspnea and syncope.   Respiratory:  Negative  for cough, hemoptysis, shortness of breath, sleep disturbances due to breathing and wheezing.    Hematologic/Lymphatic: Negative for bleeding problem. Does not bruise/bleed easily.   Skin:  Negative for poor wound healing.   Musculoskeletal:  Negative for muscle cramps and myalgias.   Gastrointestinal:  Negative for abdominal pain, anorexia, diarrhea, heartburn, hematemesis, hematochezia, melena, nausea and vomiting.   Genitourinary:  Negative for hematuria and nocturia.   Neurological:  Negative for excessive daytime sleepiness, dizziness, focal weakness, light-headedness and weakness.       Physical Exam  Vitals:    10/11/24 1251   BP: (P) 134/78   Pulse: (P) 79     Wt Readings from Last 1 Encounters:   10/11/24 (P) 73 kg (160 lb 15 oz)     Physical Exam  Vitals and nursing note reviewed.   Constitutional:       General: She is not in acute distress.     Appearance: She is not toxic-appearing or diaphoretic.   HENT:      Head: Normocephalic and atraumatic.      Mouth/Throat:      Lips: Pink.      Mouth: Mucous membranes are moist.   Eyes:      General: No scleral icterus.     Conjunctiva/sclera: Conjunctivae normal.   Neck:      Thyroid: No thyromegaly.      Vascular: No carotid bruit, hepatojugular reflux or JVD.      Trachea: Trachea normal.   Cardiovascular:      Rate and Rhythm: Normal rate and regular rhythm. No extrasystoles are present.     Chest Wall: PMI is not displaced.      Pulses:           Carotid pulses are 2+ on the right side and 2+ on the left side.       Radial pulses are 2+ on the right side and 2+ on the left side.        Dorsalis pedis pulses are 2+ on the right side and 2+ on the left side.      Heart sounds: S1 normal and S2 normal. No murmur heard.     No friction rub. Gallop present. S4 sounds present. No S3 sounds.   Pulmonary:      Effort: Pulmonary effort is normal. No accessory muscle usage or respiratory distress.      Breath sounds: Normal breath sounds and air entry. No decreased  breath sounds, wheezing, rhonchi or rales.   Abdominal:      General: Bowel sounds are normal. There is no distension or abdominal bruit.      Palpations: Abdomen is soft. There is no hepatomegaly, splenomegaly or pulsatile mass.      Tenderness: There is no abdominal tenderness.   Musculoskeletal:         General: No tenderness or deformity.      Right lower leg: No edema.      Left lower leg: No edema.   Skin:     General: Skin is warm and dry.      Capillary Refill: Capillary refill takes less than 2 seconds.      Coloration: Skin is not cyanotic or pale.      Nails: There is no clubbing.   Neurological:      General: No focal deficit present.      Mental Status: She is alert and oriented to person, place, and time.   Psychiatric:         Attention and Perception: Attention normal.         Mood and Affect: Mood normal.         Speech: Speech normal.         Behavior: Behavior normal. Behavior is cooperative.         Labs  Office Visit on 06/11/2024   Component Date Value Ref Range Status    SARS Coronavirus 2 Antigen 06/11/2024 Negative  Negative Final     Acceptable 06/11/2024 Yes   Final       Imaging  No results found.    Assessment  1. Primary hypertension  Controlled    2. Hypercholesterolemia  Controlled      Plan and Discussion    No change to present management.    The ASCVD Risk score (Dedham DK, et al., 2019) failed to calculate for the following reasons:    The 2019 ASCVD risk score is only valid for ages 40 to 79    Risk score cannot be calculated because patient has a medical history suggesting prior/existing ASCVD     Follow Up  Follow up in about 1 year (around 10/11/2025).      Chirag Chase MD

## 2024-12-04 DIAGNOSIS — I67.9 CEREBROVASCULAR DISEASE: ICD-10-CM

## 2024-12-04 RX ORDER — CLOPIDOGREL BISULFATE 75 MG/1
75 TABLET ORAL DAILY
Qty: 90 TABLET | Refills: 3 | Status: SHIPPED | OUTPATIENT
Start: 2024-12-04

## 2025-04-08 ENCOUNTER — TELEPHONE (OUTPATIENT)
Dept: CARDIOLOGY | Facility: CLINIC | Age: 85
End: 2025-04-08
Payer: MEDICARE

## 2025-04-08 ENCOUNTER — NURSE TRIAGE (OUTPATIENT)
Dept: ADMINISTRATIVE | Facility: CLINIC | Age: 85
End: 2025-04-08
Payer: MEDICARE

## 2025-04-08 NOTE — TELEPHONE ENCOUNTER
Spoke with patient and Dr. Chase.  Patient will see PCP today and get EKG.  Instructed her to go to ER but she refuses.  If pain worsens she will go to ER.  Made appt to see Dr. Chase at [facility] next month.  Patient verbalizes understanding.

## 2025-04-08 NOTE — TELEPHONE ENCOUNTER
Patient c/o hypertension. Last BP was 167/97. Also stated that she has jaw pain, nausea, and chest pain. Patient refused triage. States that she can not talk right now and will call back. Will send a message to her provider's office.       Reason for Disposition   Caller has cancelled the call before the first contact    Protocols used: No Contact or Duplicate Contact Call-A-AH

## 2025-04-25 ENCOUNTER — OFFICE VISIT (OUTPATIENT)
Dept: DERMATOLOGY | Facility: CLINIC | Age: 85
End: 2025-04-25
Payer: MEDICARE

## 2025-04-25 DIAGNOSIS — L21.9 SEBORRHEIC DERMATITIS: ICD-10-CM

## 2025-04-25 DIAGNOSIS — L60.3 BRITTLE NAILS: ICD-10-CM

## 2025-04-25 DIAGNOSIS — L29.9 BRACHIORADIAL PRURITUS: Primary | ICD-10-CM

## 2025-04-25 PROCEDURE — 99999 PR PBB SHADOW E&M-EST. PATIENT-LVL II: CPT | Mod: PBBFAC,,, | Performed by: DERMATOLOGY

## 2025-04-25 RX ORDER — KETOCONAZOLE 20 MG/ML
SHAMPOO, SUSPENSION TOPICAL
Qty: 120 ML | Refills: 5 | Status: SHIPPED | OUTPATIENT
Start: 2025-04-25

## 2025-04-25 NOTE — PROGRESS NOTES
Subjective:      Patient ID:  Lori Pulido is a 85 y.o. female who presents for   Chief Complaint   Patient presents with    Follow-up     Seborrheic dermatitis and pruritus    Nail Problem     Brittle nails     Follow up seborrheic dermatitis doing well now,  pruritus on right arm using otc lotion no better.  Also brittle nails the ammonium lactate does not help much.  (See notes Dr Cope    Itching - Initial  Affected locations: scalp, left arm and right arm  Signs / symptoms: itching      Review of Systems   Constitutional:  Negative for fever, chills, weight loss, weight gain, fatigue, night sweats and malaise.   Skin:  Positive for itching. Negative for daily sunscreen use, activity-related sunscreen use and wears hat.   Hematologic/Lymphatic: Bruises/bleeds easily.       Objective:   Physical Exam   Constitutional: She appears well-developed and well-nourished.   Neurological: She is alert and oriented to person, place, and time.   Psychiatric: She has a normal mood and affect.   Skin:   Areas Examined (abnormalities noted in diagram):   Scalp / Hair Palpated and Inspected  RUE Inspected  Nails and Digits Inspection Performed                     Diagram Legend     Erythematous scaling macule/papule c/w actinic keratosis       Vascular papule c/w angioma      Pigmented verrucoid papule/plaque c/w seborrheic keratosis      Yellow umbilicated papule c/w sebaceous hyperplasia      Irregularly shaped tan macule c/w lentigo     1-2 mm smooth white papules consistent with Milia      Movable subcutaneous cyst with punctum c/w epidermal inclusion cyst      Subcutaneous movable cyst c/w pilar cyst      Firm pink to brown papule c/w dermatofibroma      Pedunculated fleshy papule(s) c/w skin tag(s)      Evenly pigmented macule c/w junctional nevus     Mildly variegated pigmented, slightly irregular-bordered macule c/w mildly atypical nevus      Flesh colored to evenly pigmented papule c/w intradermal nevus        Pink pearly papule/plaque c/w basal cell carcinoma      Erythematous hyperkeratotic cursted plaque c/w SCC      Surgical scar with no sign of skin cancer recurrence      Open and closed comedones      Inflammatory papules and pustules      Verrucoid papule consistent consistent with wart     Erythematous eczematous patches and plaques     Dystrophic onycholytic nail with subungual debris c/w onychomycosis     Umbilicated papule    Erythematous-base heme-crusted tan verrucoid plaque consistent with inflamed seborrheic keratosis     Erythematous Silvery Scaling Plaque c/w Psoriasis     See annotation      Assessment / Plan:        Brachioradial pruritus  Dermeleve lotion prn    Seborrheic dermatitis, chronic  -     ketoconazole (NIZORAL) 2 % shampoo; Wash hair with medicated shampoo at least 2x/week - let sit on scalp at least 5 minutes prior to rinsing  Dispense: 120 mL; Refill: 5  Also uses lidex solution prn    Brittle nails not at treatment goal  Shannan garcia hard as nails original formula  Gloves for dishwashing, lotion after hand washing.              Follow up if symptoms worsen or fail to improve.